# Patient Record
Sex: FEMALE | Race: WHITE | NOT HISPANIC OR LATINO | Employment: FULL TIME | URBAN - METROPOLITAN AREA
[De-identification: names, ages, dates, MRNs, and addresses within clinical notes are randomized per-mention and may not be internally consistent; named-entity substitution may affect disease eponyms.]

---

## 2019-12-28 ENCOUNTER — HOSPITAL ENCOUNTER (EMERGENCY)
Facility: MEDICAL CENTER | Age: 54
End: 2019-12-28
Attending: EMERGENCY MEDICINE
Payer: COMMERCIAL

## 2019-12-28 VITALS
RESPIRATION RATE: 20 BRPM | WEIGHT: 163.14 LBS | SYSTOLIC BLOOD PRESSURE: 157 MMHG | HEART RATE: 51 BPM | DIASTOLIC BLOOD PRESSURE: 59 MMHG | HEIGHT: 68 IN | BODY MASS INDEX: 24.73 KG/M2 | OXYGEN SATURATION: 97 % | TEMPERATURE: 96.9 F

## 2019-12-28 DIAGNOSIS — H53.9 VISUAL CHANGES: ICD-10-CM

## 2019-12-28 PROCEDURE — 99284 EMERGENCY DEPT VISIT MOD MDM: CPT

## 2019-12-28 RX ORDER — PROPARACAINE HYDROCHLORIDE 5 MG/ML
1 SOLUTION/ DROPS OPHTHALMIC ONCE
Status: DISCONTINUED | OUTPATIENT
Start: 2019-12-28 | End: 2019-12-28 | Stop reason: HOSPADM

## 2019-12-28 NOTE — DISCHARGE INSTRUCTIONS
Please call the ophthalmologist office tomorrow morning to make a follow-up appointment.  Return if any of the symptoms change or worsen

## 2019-12-28 NOTE — ED TRIAGE NOTES
"Presents accompanied by family.  She reports a history of right eye retinal detachment with surgical intervention this past August .  Today, she C/O similar symptoms with adjunct \"redness.\"  She is bradycardic consequent to athletic fitness level.    Chief Complaint   Patient presents with   • Eye Pain     /81   Pulse (!) 58   Temp 36.1 °C (96.9 °F)   Resp 20   Ht 1.727 m (5' 8\")   Wt 74 kg (163 lb 2.3 oz)   SpO2 98%   BMI 24.81 kg/m²       "

## 2019-12-28 NOTE — ED PROVIDER NOTES
"ED Provider Note  ED Provider    Means of arrival: Private vehicle  History obtained from: Patient  History limited by: None    CHIEF COMPLAINT  Chief Complaint   Patient presents with   • Eye Injury       HPI  Melva Saucead is a 54 y.o. female who presents with complaints of change in vision of the right eye, the changes isolated as a color change.  She noticed that in the right eye everything is pink in color.  There is no blurred vision no change in clarity of vision, just the color change.  She has no flashes.  She has chronic floaters which has not changed.  She has a history of retinal detachment that I had surgery done in 2017 by the ER repair of the retina and also took out her artificial lens.  She is had no problems since that time.    REVIEW OF SYSTEMS  See HPI for further details.     PAST MEDICAL HISTORY       SOCIAL HISTORY  Social History     Tobacco Use   • Smoking status: Never Smoker   • Smokeless tobacco: Never Used   Substance and Sexual Activity   • Alcohol use: Yes     Comment: Occasionally   • Drug use: Never   • Sexual activity: Not on file       SURGICAL HISTORY  patient denies any surgical history    CURRENT MEDICATIONS  Home Medications     Reviewed by Nerissa Mayer (Pharmacy Tech) on 12/28/19 at 1146  Med List Status: Complete   Medication Last Dose Status        Patient Arash Taking any Medications                       ALLERGIES  No Known Allergies    PHYSICAL EXAM  VITAL SIGNS: /81   Pulse (!) 58   Temp 36.1 °C (96.9 °F)   Resp 20   Ht 1.727 m (5' 8\")   Wt 74 kg (163 lb 2.3 oz)   SpO2 98%   BMI 24.81 kg/m²   Constitutional: Alert in no apparent distress.  HENT: Normocephalic, Atraumatic, Mucous membranes are moist  Eyes:  Conjunctiva normal, non-icteric.  Extraocular movements are intact.  Funduscopic exam shows no signs of bleeding, no signs of retinal detachment.    Slit-lamp exam shows no corneal abrasion, anterior chambers deep and clear.  There is a retained " suture at the upper sclera behind the lid both no signs of infection or swelling  Heart: no peripheral cyanosis  Lungs: respirations are even and unlabored  Skin: Warm, Dry,normal color  Neurologic: Alert, Grossly non-focal.   Psychiatric: Affect normal, Judgment normal, Mood normal, Appears appropriate and not intoxicated.     MEDICAL DECISION MAKING  This is a 54 y.o. female who presents with color change to the right eye.  It is global to the right eye, and not in sections not consistent with a retinal detachment.  She has no flashes and her floaters are unchanged.  I can see no signs of bleeding or detachment.  I spoke with dr Valdez the ophthalmologist on call and she will follow the patient up in the office.  There is no blurred vision, no change in her visual acuity that she has noticed is just an isolated color change    DIAGNOSTIC STUDIES / PROCEDURES    LABS      RADIOLOGY  No orders to display       COURSE  Pertinent Labs & Imaging studies reviewed. (See chart for details)    12:34 PM - Patient seen and examined at bedside. Discussed plan of care,    Procedure: Alcaine drop installation, fluorescein drop instillation slit-lamp examination    FINAL IMPRESSION  1. Visual changes

## 2020-02-13 ENCOUNTER — HOSPITAL ENCOUNTER (OUTPATIENT)
Dept: RADIOLOGY | Facility: MEDICAL CENTER | Age: 55
End: 2020-02-13
Attending: PHYSICIAN ASSISTANT
Payer: COMMERCIAL

## 2020-02-13 DIAGNOSIS — S92.354A CLOSED NONDISPLACED FRACTURE OF FIFTH METATARSAL BONE OF RIGHT FOOT, INITIAL ENCOUNTER: ICD-10-CM

## 2020-02-13 PROCEDURE — 73718 MRI LOWER EXTREMITY W/O DYE: CPT | Mod: RT

## 2020-07-31 ENCOUNTER — TELEPHONE (OUTPATIENT)
Dept: SCHEDULING | Facility: IMAGING CENTER | Age: 55
End: 2020-07-31

## 2020-08-13 ENCOUNTER — OFFICE VISIT (OUTPATIENT)
Dept: MEDICAL GROUP | Facility: MEDICAL CENTER | Age: 55
End: 2020-08-13
Payer: COMMERCIAL

## 2020-08-13 ENCOUNTER — HOSPITAL ENCOUNTER (OUTPATIENT)
Facility: MEDICAL CENTER | Age: 55
End: 2020-08-13
Attending: NURSE PRACTITIONER
Payer: COMMERCIAL

## 2020-08-13 VITALS
DIASTOLIC BLOOD PRESSURE: 70 MMHG | WEIGHT: 163.14 LBS | OXYGEN SATURATION: 97 % | SYSTOLIC BLOOD PRESSURE: 130 MMHG | RESPIRATION RATE: 18 BRPM | TEMPERATURE: 97.6 F | HEART RATE: 60 BPM | BODY MASS INDEX: 24.73 KG/M2 | HEIGHT: 68 IN

## 2020-08-13 DIAGNOSIS — Z98.890 HISTORY OF RIGHT BREAST BIOPSY: ICD-10-CM

## 2020-08-13 DIAGNOSIS — Z80.3 FAMILY HISTORY OF BREAST CANCER IN MOTHER: ICD-10-CM

## 2020-08-13 DIAGNOSIS — Z01.419 ENCOUNTER FOR GYNECOLOGICAL EXAMINATION WITHOUT ABNORMAL FINDING: ICD-10-CM

## 2020-08-13 DIAGNOSIS — Z13.29 THYROID DISORDER SCREEN: ICD-10-CM

## 2020-08-13 DIAGNOSIS — Z13.220 LIPID SCREENING: ICD-10-CM

## 2020-08-13 DIAGNOSIS — Z98.890 HISTORY OF LUMPECTOMY: ICD-10-CM

## 2020-08-13 DIAGNOSIS — N64.59 ABNORMAL BREAST EXAM: ICD-10-CM

## 2020-08-13 DIAGNOSIS — Z13.21 ENCOUNTER FOR VITAMIN DEFICIENCY SCREENING: ICD-10-CM

## 2020-08-13 PROBLEM — Z86.69 HISTORY OF RETINAL DETACHMENT: Status: ACTIVE | Noted: 2020-08-13

## 2020-08-13 PROBLEM — Z98.49 HISTORY OF CATARACT EXTRACTION: Status: ACTIVE | Noted: 2020-08-13

## 2020-08-13 PROCEDURE — 99386 PREV VISIT NEW AGE 40-64: CPT | Performed by: NURSE PRACTITIONER

## 2020-08-13 PROCEDURE — 99213 OFFICE O/P EST LOW 20 MIN: CPT | Mod: 25 | Performed by: NURSE PRACTITIONER

## 2020-08-13 PROCEDURE — 99000 SPECIMEN HANDLING OFFICE-LAB: CPT | Performed by: NURSE PRACTITIONER

## 2020-08-13 PROCEDURE — 88175 CYTOPATH C/V AUTO FLUID REDO: CPT

## 2020-08-13 PROCEDURE — 87624 HPV HI-RISK TYP POOLED RSLT: CPT

## 2020-08-13 SDOH — HEALTH STABILITY: MENTAL HEALTH: HOW OFTEN DO YOU HAVE A DRINK CONTAINING ALCOHOL?: 2-3 TIMES A WEEK

## 2020-08-13 NOTE — ASSESSMENT & PLAN NOTE
She reports remote history of abnormality on mammogram, lumpectomy in the right breast which was benign.  She had markers placed here.  She is also had biopsy in the left breast which was benign.  In the last month or so she believes that the appearance of her breasts are changing, they are no longer symmetrical and she feels that there is some dimpling below the areola in the left breast.  She has not felt a discrete mass.  Her last mammogram was approximately 6 years ago.  Her mother was diagnosed with breast cancer in her early 40s.  She denies swelling or tenderness in the axillary region, nipple discharge, overlying skin changes

## 2020-08-16 LAB
CYTOLOGY REG CYTOL: NORMAL
HPV HR 12 DNA CVX QL NAA+PROBE: NEGATIVE
HPV16 DNA SPEC QL NAA+PROBE: NEGATIVE
HPV18 DNA SPEC QL NAA+PROBE: NEGATIVE
SPECIMEN SOURCE: NORMAL

## 2020-08-20 ENCOUNTER — HOSPITAL ENCOUNTER (OUTPATIENT)
Dept: RADIOLOGY | Facility: MEDICAL CENTER | Age: 55
End: 2020-08-20
Attending: NURSE PRACTITIONER
Payer: COMMERCIAL

## 2020-08-20 DIAGNOSIS — N64.59 ABNORMAL BREAST EXAM: ICD-10-CM

## 2020-08-20 DIAGNOSIS — R87.619 ATYPICAL ENDOMETRIAL CELLS ON PAP SMEAR: ICD-10-CM

## 2020-08-20 PROCEDURE — 76642 ULTRASOUND BREAST LIMITED: CPT | Mod: LT

## 2020-08-20 PROCEDURE — G0279 TOMOSYNTHESIS, MAMMO: HCPCS

## 2021-03-15 DIAGNOSIS — Z23 NEED FOR VACCINATION: ICD-10-CM

## 2021-09-11 ENCOUNTER — APPOINTMENT (OUTPATIENT)
Dept: RADIOLOGY | Facility: MEDICAL CENTER | Age: 56
End: 2021-09-11
Attending: EMERGENCY MEDICINE
Payer: COMMERCIAL

## 2021-09-11 ENCOUNTER — HOSPITAL ENCOUNTER (EMERGENCY)
Facility: MEDICAL CENTER | Age: 56
End: 2021-09-11
Attending: EMERGENCY MEDICINE
Payer: COMMERCIAL

## 2021-09-11 VITALS
DIASTOLIC BLOOD PRESSURE: 62 MMHG | BODY MASS INDEX: 24.06 KG/M2 | HEIGHT: 68 IN | OXYGEN SATURATION: 91 % | TEMPERATURE: 97 F | WEIGHT: 158.73 LBS | RESPIRATION RATE: 16 BRPM | HEART RATE: 52 BPM | SYSTOLIC BLOOD PRESSURE: 138 MMHG

## 2021-09-11 DIAGNOSIS — Z51.89 ENCOUNTER FOR WOUND RE-CHECK: ICD-10-CM

## 2021-09-11 LAB
ALBUMIN SERPL BCP-MCNC: 4.2 G/DL (ref 3.2–4.9)
ALBUMIN/GLOB SERPL: 1.7 G/DL
ALP SERPL-CCNC: 59 U/L (ref 30–99)
ALT SERPL-CCNC: 12 U/L (ref 2–50)
ANION GAP SERPL CALC-SCNC: 13 MMOL/L (ref 7–16)
AST SERPL-CCNC: 18 U/L (ref 12–45)
BASOPHILS # BLD AUTO: 0.4 % (ref 0–1.8)
BASOPHILS # BLD: 0.03 K/UL (ref 0–0.12)
BILIRUB SERPL-MCNC: 0.5 MG/DL (ref 0.1–1.5)
BUN SERPL-MCNC: 19 MG/DL (ref 8–22)
CALCIUM SERPL-MCNC: 9.1 MG/DL (ref 8.4–10.2)
CHLORIDE SERPL-SCNC: 103 MMOL/L (ref 96–112)
CO2 SERPL-SCNC: 24 MMOL/L (ref 20–33)
CREAT SERPL-MCNC: 0.55 MG/DL (ref 0.5–1.4)
CRP SERPL HS-MCNC: <0.3 MG/DL (ref 0–0.75)
EOSINOPHIL # BLD AUTO: 0.03 K/UL (ref 0–0.51)
EOSINOPHIL NFR BLD: 0.4 % (ref 0–6.9)
ERYTHROCYTE [DISTWIDTH] IN BLOOD BY AUTOMATED COUNT: 42 FL (ref 35.9–50)
ERYTHROCYTE [SEDIMENTATION RATE] IN BLOOD BY WESTERGREN METHOD: 10 MM/HOUR (ref 0–25)
GLOBULIN SER CALC-MCNC: 2.5 G/DL (ref 1.9–3.5)
GLUCOSE SERPL-MCNC: 94 MG/DL (ref 65–99)
HCT VFR BLD AUTO: 44.7 % (ref 37–47)
HGB BLD-MCNC: 14.8 G/DL (ref 12–16)
IMM GRANULOCYTES # BLD AUTO: 0.02 K/UL (ref 0–0.11)
IMM GRANULOCYTES NFR BLD AUTO: 0.3 % (ref 0–0.9)
LYMPHOCYTES # BLD AUTO: 1.53 K/UL (ref 1–4.8)
LYMPHOCYTES NFR BLD: 22.6 % (ref 22–41)
MCH RBC QN AUTO: 32 PG (ref 27–33)
MCHC RBC AUTO-ENTMCNC: 33.1 G/DL (ref 33.6–35)
MCV RBC AUTO: 96.8 FL (ref 81.4–97.8)
MONOCYTES # BLD AUTO: 0.64 K/UL (ref 0–0.85)
MONOCYTES NFR BLD AUTO: 9.4 % (ref 0–13.4)
NEUTROPHILS # BLD AUTO: 4.53 K/UL (ref 2–7.15)
NEUTROPHILS NFR BLD: 66.9 % (ref 44–72)
NRBC # BLD AUTO: 0 K/UL
NRBC BLD-RTO: 0 /100 WBC
PLATELET # BLD AUTO: 269 K/UL (ref 164–446)
PMV BLD AUTO: 8.7 FL (ref 9–12.9)
POTASSIUM SERPL-SCNC: 4.3 MMOL/L (ref 3.6–5.5)
PROT SERPL-MCNC: 6.7 G/DL (ref 6–8.2)
RBC # BLD AUTO: 4.62 M/UL (ref 4.2–5.4)
SODIUM SERPL-SCNC: 140 MMOL/L (ref 135–145)
WBC # BLD AUTO: 6.8 K/UL (ref 4.8–10.8)

## 2021-09-11 PROCEDURE — 85025 COMPLETE CBC W/AUTO DIFF WBC: CPT

## 2021-09-11 PROCEDURE — 99283 EMERGENCY DEPT VISIT LOW MDM: CPT

## 2021-09-11 PROCEDURE — 85652 RBC SED RATE AUTOMATED: CPT

## 2021-09-11 PROCEDURE — 73630 X-RAY EXAM OF FOOT: CPT | Mod: RT

## 2021-09-11 PROCEDURE — 86140 C-REACTIVE PROTEIN: CPT

## 2021-09-11 PROCEDURE — 80053 COMPREHEN METABOLIC PANEL: CPT

## 2021-09-11 NOTE — ED TRIAGE NOTES
"Reports a history of right foot surgery approximately 3.5 weeks ago.  She C/O of possible cellulitis with increasing pain and swelling.  She is currently on antibiotic therapy.   Chief Complaint   Patient presents with   • Foot Pain   • Wound Check     /96   Pulse 72   Temp 36.1 °C (97 °F) (Temporal)   Resp 20   Ht 1.727 m (5' 8\")   Wt 72 kg (158 lb 11.7 oz)   SpO2 99%   BMI 24.14 kg/m²    Has this patient been vaccinated for COVID yes        "

## 2021-09-11 NOTE — ED PROVIDER NOTES
ED Provider Note    CHIEF COMPLAINT  Chief Complaint   Patient presents with   • Foot Pain   • Wound Check       HPI  Melva Sauceda is a 56 y.o. female who presents for evaluation of worsening of pain on the dorsal aspect of the right medial foot.  The patient is around 3 weeks postop with Dr. Hooper from a right bunionectomy.  The patient was doing well postoperatively and then developed some subtle redness.  He placed her on some Keflex.  She denies high fevers or chills.  She reports that there is some localized irritation at the surgical site.  No high fevers or chills.  The patient is otherwise healthy with no history of diabetes.  She is an avid distance runner    REVIEW OF SYSTEMS  See HPI for further details.  No high fevers chills night sweats weight loss all other systems are negative.     PAST MEDICAL HISTORY  No past medical history on file.  History of bunion  FAMILY HISTORY  Noncontributory    SOCIAL HISTORY  Social History     Socioeconomic History   • Marital status: Single     Spouse name: Not on file   • Number of children: Not on file   • Years of education: Not on file   • Highest education level: Not on file   Occupational History   • Not on file   Tobacco Use   • Smoking status: Never Smoker   • Smokeless tobacco: Never Used   Substance and Sexual Activity   • Alcohol use: Yes     Comment: Occasionally   • Drug use: Never   • Sexual activity: Not Currently   Other Topics Concern   • Not on file   Social History Narrative   • Not on file     Social Determinants of Health     Financial Resource Strain:    • Difficulty of Paying Living Expenses:    Food Insecurity:    • Worried About Running Out of Food in the Last Year:    • Ran Out of Food in the Last Year:    Transportation Needs:    • Lack of Transportation (Medical):    • Lack of Transportation (Non-Medical):    Physical Activity:    • Days of Exercise per Week:    • Minutes of Exercise per Session:    Stress:    • Feeling of Stress :   "  Social Connections:    • Frequency of Communication with Friends and Family:    • Frequency of Social Gatherings with Friends and Family:    • Attends Alevism Services:    • Active Member of Clubs or Organizations:    • Attends Club or Organization Meetings:    • Marital Status:    Intimate Partner Violence:    • Fear of Current or Ex-Partner:    • Emotionally Abused:    • Physically Abused:    • Sexually Abused:      No IV drugs  SURGICAL HISTORY  No past surgical history on file.  Recent bunion surgery with Dr. Hooper  CURRENT MEDICATIONS  Home Medications    **Home medications have not yet been reviewed for this encounter**       Keflex  ALLERGIES  No Known Allergies    PHYSICAL EXAM  VITAL SIGNS: /96   Pulse 72   Temp 36.1 °C (97 °F) (Temporal)   Resp 20   Ht 1.727 m (5' 8\")   Wt 72 kg (158 lb 11.7 oz)   SpO2 99%   BMI 24.14 kg/m²       Constitutional: Well developed, Well nourished, No acute distress, Non-toxic appearance.   HENT: Normocephalic, Atraumatic, Bilateral external ears normal, Oropharynx moist, No oral exudates, Nose normal.   Eyes: PERRLA, EOMI, Conjunctiva normal, No discharge.   Neck: Normal range of motion, No tenderness, Supple, No stridor.   Cardiovascular: Normal heart rate, Normal rhythm, No murmurs, No rubs, No gallops.   Thorax & Lungs: Normal breath sounds, No respiratory distress, No wheezing, No chest tenderness.   Abdomen: Bowel sounds normal, Soft, No tenderness, No masses, No pulsatile masses.   Skin: Warm, Dry, No erythema, No rash.   Extremities: Right lower extremity exam is notable for longitudinal incision overlying the first metatarsal.  There is some minimal erythema but no dehiscence no pus.  Surgical incision is otherwise clean dry and intact.  Surgical incision on the base of the right fifth toe is clean dry and intact  Neurologic: Alert & oriented x 3, Normal motor function, Normal sensory function, No focal deficits noted.   Psychiatric: Affect normal, " Judgment normal, Mood normal.     DX-FOOT-COMPLETE 3+ RIGHT   Final Result      Fifth metatarsal shaft fracture fixation      First TMT plate and screw fixation      No radiographic evidence of bony destruction or acute fracture      Forefoot soft tissue swelling          Results for orders placed or performed during the hospital encounter of 09/11/21   Sed Rate   Result Value Ref Range    Sed Rate Westergren 10 0 - 25 mm/hour   CRP QUANTITIVE (NON-CARDIAC)   Result Value Ref Range    Stat C-Reactive Protein <0.30 0.00 - 0.75 mg/dL   CBC WITH DIFFERENTIAL   Result Value Ref Range    WBC 6.8 4.8 - 10.8 K/uL    RBC 4.62 4.20 - 5.40 M/uL    Hemoglobin 14.8 12.0 - 16.0 g/dL    Hematocrit 44.7 37.0 - 47.0 %    MCV 96.8 81.4 - 97.8 fL    MCH 32.0 27.0 - 33.0 pg    MCHC 33.1 (L) 33.6 - 35.0 g/dL    RDW 42.0 35.9 - 50.0 fL    Platelet Count 269 164 - 446 K/uL    MPV 8.7 (L) 9.0 - 12.9 fL    Neutrophils-Polys 66.90 44.00 - 72.00 %    Lymphocytes 22.60 22.00 - 41.00 %    Monocytes 9.40 0.00 - 13.40 %    Eosinophils 0.40 0.00 - 6.90 %    Basophils 0.40 0.00 - 1.80 %    Immature Granulocytes 0.30 0.00 - 0.90 %    Nucleated RBC 0.00 /100 WBC    Neutrophils (Absolute) 4.53 2.00 - 7.15 K/uL    Lymphs (Absolute) 1.53 1.00 - 4.80 K/uL    Monos (Absolute) 0.64 0.00 - 0.85 K/uL    Eos (Absolute) 0.03 0.00 - 0.51 K/uL    Baso (Absolute) 0.03 0.00 - 0.12 K/uL    Immature Granulocytes (abs) 0.02 0.00 - 0.11 K/uL    NRBC (Absolute) 0.00 K/uL   Comp Metabolic Panel   Result Value Ref Range    Sodium 140 135 - 145 mmol/L    Potassium 4.3 3.6 - 5.5 mmol/L    Chloride 103 96 - 112 mmol/L    Co2 24 20 - 33 mmol/L    Anion Gap 13.0 7.0 - 16.0    Glucose 94 65 - 99 mg/dL    Bun 19 8 - 22 mg/dL    Creatinine 0.55 0.50 - 1.40 mg/dL    Calcium 9.1 8.4 - 10.2 mg/dL    AST(SGOT) 18 12 - 45 U/L    ALT(SGPT) 12 2 - 50 U/L    Alkaline Phosphatase 59 30 - 99 U/L    Total Bilirubin 0.5 0.1 - 1.5 mg/dL    Albumin 4.2 3.2 - 4.9 g/dL    Total Protein 6.7 6.0  - 8.2 g/dL    Globulin 2.5 1.9 - 3.5 g/dL    A-G Ratio 1.7 g/dL   ESTIMATED GFR   Result Value Ref Range    GFR If African American >60 >60 mL/min/1.73 m 2    GFR If Non African American >60 >60 mL/min/1.73 m 2       COURSE & MEDICAL DECISION MAKING  Pertinent Labs & Imaging studies reviewed. (See chart for details)  I reviewed the patient's visit history.  I was concerned about possible either local wound or deep space infection.  Here she has no fever or leukocytosis.  Specifically there is no bandemia metabolic acidosis.  CRP and sed rate are normal.  Radiograph does not suggest any suggestion of osteomyelitis.  I reviewed the case with Dr. De La Vega.  The patient is only 2 to 3 days into her oral antibiotic regimen.  I see no overt evidence of wound infection or deep space infection.  I recommended that she continue the full course of the Keflex.  I have provided and applied appropriate antibiotic ointment and nonadhesive dressings and provided her with supplies.  I recommended following up with Dr. Hooper for wound check in 2 to 3 days or to return as needed for new or worsening symptoms    FINAL IMPRESSION  1.  Encounter for wound care, postoperative right foot      Electronically signed by: Bam Duke M.D., 9/11/2021 12:40 PM

## 2021-09-11 NOTE — ED NOTES
Assisted w/ discharge.  Reviewed discharge instructions w/ pt, verbalized understanding to information provided including follow up care and return precautions, denied questions/concerns.  Pt placed brace back on RLE.  Pt ambulated from ED.

## 2021-11-16 ENCOUNTER — OFFICE VISIT (OUTPATIENT)
Dept: MEDICAL GROUP | Facility: MEDICAL CENTER | Age: 56
End: 2021-11-16
Payer: COMMERCIAL

## 2021-11-16 VITALS
DIASTOLIC BLOOD PRESSURE: 82 MMHG | WEIGHT: 162.48 LBS | HEIGHT: 68 IN | TEMPERATURE: 97.1 F | OXYGEN SATURATION: 99 % | BODY MASS INDEX: 24.62 KG/M2 | SYSTOLIC BLOOD PRESSURE: 130 MMHG | RESPIRATION RATE: 18 BRPM | HEART RATE: 52 BPM

## 2021-11-16 DIAGNOSIS — Z12.31 ENCOUNTER FOR SCREENING MAMMOGRAM FOR MALIGNANT NEOPLASM OF BREAST: ICD-10-CM

## 2021-11-16 DIAGNOSIS — N63.20 BREAST MASS, LEFT: ICD-10-CM

## 2021-11-16 DIAGNOSIS — Z13.21 ENCOUNTER FOR VITAMIN DEFICIENCY SCREENING: ICD-10-CM

## 2021-11-16 DIAGNOSIS — Z00.00 ANNUAL PHYSICAL EXAM: ICD-10-CM

## 2021-11-16 DIAGNOSIS — Z13.220 LIPID SCREENING: ICD-10-CM

## 2021-11-16 DIAGNOSIS — Z13.29 THYROID DISORDER SCREEN: ICD-10-CM

## 2021-11-16 PROCEDURE — 99396 PREV VISIT EST AGE 40-64: CPT | Performed by: NURSE PRACTITIONER

## 2021-11-16 PROCEDURE — 99213 OFFICE O/P EST LOW 20 MIN: CPT | Mod: 25 | Performed by: NURSE PRACTITIONER

## 2021-11-16 RX ORDER — GABAPENTIN 100 MG/1
CAPSULE ORAL
COMMUNITY
End: 2021-11-16

## 2021-11-16 RX ORDER — CEPHALEXIN 500 MG/1
CAPSULE ORAL
COMMUNITY
Start: 2021-09-09 | End: 2021-11-16

## 2021-11-16 RX ORDER — CEPHALEXIN 500 MG/1
CAPSULE ORAL
COMMUNITY
End: 2021-11-16

## 2021-11-16 RX ORDER — GABAPENTIN 100 MG/1
CAPSULE ORAL
COMMUNITY
Start: 2021-08-20 | End: 2021-11-16

## 2021-11-16 RX ORDER — OXYCODONE HYDROCHLORIDE 5 MG/1
TABLET ORAL
COMMUNITY
End: 2021-11-16

## 2021-11-16 RX ORDER — TRAMADOL HYDROCHLORIDE 50 MG/1
TABLET ORAL
COMMUNITY
End: 2021-11-16

## 2021-11-16 ASSESSMENT — PATIENT HEALTH QUESTIONNAIRE - PHQ9: CLINICAL INTERPRETATION OF PHQ2 SCORE: 0

## 2021-11-16 ASSESSMENT — FIBROSIS 4 INDEX: FIB4 SCORE: 1.1

## 2021-11-16 NOTE — PROGRESS NOTES
"Subjective:     Chief Complaint   Patient presents with   • Breast Mass     (left)     Melva Sauceda is a 56 y.o. female here for annual and with below concern.  Up-to-date on Pap smear as well as colon cancer screening.  Influenza vaccine declined today    She has not been able to do as much cardio exercise as usual due to a recent foot fracture and surgical repair.  She is gradually getting back into her exercise regimen    Blood pressure is elevated in the clinic today with initial reading of 144/86.  Repeat 130/82.  No medications for this currently.  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  She noticed this recently, it has not changed in size during that time.  She does have history of lumpectomy and benign biopsy in the right breast.  Last year she was feeling a small similar area on the left, diagnostic mammogram at that time showed a small simple cyst  She has had an inverted nipple on this left side for quite some time but feels that it is getting worse  Mother diagnosed with breast cancer in her early 40s.  She is unsure of any BRCA testing  No nipple discharge, no associated pain or overlying skin changes       Current medicines (including changes today)  No current outpatient medications on file.     No current facility-administered medications for this visit.     She  has no past medical history on file.    ROS included above     Objective:     /82 (BP Location: Left arm, Patient Position: Sitting, BP Cuff Size: Adult)   Pulse (!) 52   Temp 36.2 °C (97.1 °F) (Temporal)   Resp 18   Ht 1.727 m (5' 8\")   Wt 73.7 kg (162 lb 7.7 oz)   SpO2 99%  Body mass index is 24.7 kg/m².     Physical Exam:  General: Alert, oriented in no acute distress.  Eye contact is good, speech is normal, affect calm  HEENT:  TMs gray with good landmarks " bilaterally. No lymphadenopathy.  Lungs: clear to auscultation bilaterally, normal effort, no wheeze/ rhonchi/ rales.  CV: regular rate and rhythm, S1, S2, no murmur  Breast: Right breast smooth without palpable mass.  No nipple inversion or discharge.  Left breast with approximately 4 cm irregularly shaped mass in the upper outer quadrant at approximately 2-3 o'clock.  Nipple inversion noted.  No discharge.  No overlying erythema or skin changes.  No axillary lymphadenopathy.   Abdomen: soft, nontender  Ext: no edema, color normal, vascularity normal, temperature normal    Assessment and Plan:   The following treatment plan was discussed   1. Annual physical exam Normal physical exam except as discussed below.  Up-to-date on Pap smear. General health and wellness discussion including healthy diet, regular exercise. 2000 iu Vit d3 advised daily. Preventative health screenings -labs as listed. Advised regular dental cleanings, eye exam yearly.  Blood pressure initially elevated in clinic, improved on repeat reading.  Discussed cutoff for hypertension being 135/90.  She will monitor blood pressure at home and may report back to me with readings.  Encouraged increase cardio exercise, low-sodium diet.  Continue avoidance of alcohol and tobacco products.  Comp Metabolic Panel    Lipid Profile    TSH WITH REFLEX TO FT4    VITAMIN D,25 HYDROXY   2. Breast mass, left   approximately 4 cm irregularly shaped palpable mass in the left upper outer quadrant.  Mother diagnosed with breast cancer in her 40s.  She personally has history of benign biopsy and lumpectomy on the right breast.  We will evaluate diagnostic imaging  MA-DIAGNOSTIC MAMMO BILAT W/O CAD    US-BREAST LIMITED-LEFT   3. Encounter for screening mammogram for malignant neoplasm of breast  MA-DIAGNOSTIC MAMMO BILAT W/O CAD   4. Encounter for vitamin deficiency screening  VITAMIN D,25 HYDROXY   5. Thyroid disorder screen  TSH WITH REFLEX TO FT4   6. Lipid screening   Lipid Profile       Followup: Pending labs and imaging         Please note that this dictation was created using voice recognition software. I have worked with consultants from the vendor as well as technical experts from [facility] to optimize the interface. I have made every reasonable attempt to correct obvious errors, but I expect that there are errors of grammar and possibly content that I did not discover before finalizing the note.

## 2021-11-16 NOTE — ASSESSMENT & PLAN NOTE
Patient presents today with a concern of a new mass in the left breast upper outer quadrant.  She noticed this recently, it has not changed in size during that time.  She does have history of lumpectomy and benign biopsy in the right breast.  Last year she was feeling a small similar area on the left, diagnostic mammogram at that time showed a small simple cyst  She has had an inverted nipple on this left side for quite some time but feels that it is getting worse  Mother diagnosed with breast cancer in her early 40s.  She is unsure of any BRCA testing  No nipple discharge, no associated pain or overlying skin changes

## 2021-11-19 ENCOUNTER — HOSPITAL ENCOUNTER (OUTPATIENT)
Dept: LAB | Facility: MEDICAL CENTER | Age: 56
End: 2021-11-19
Attending: NURSE PRACTITIONER
Payer: COMMERCIAL

## 2021-11-19 DIAGNOSIS — Z13.29 THYROID DISORDER SCREEN: ICD-10-CM

## 2021-11-19 DIAGNOSIS — Z13.220 LIPID SCREENING: ICD-10-CM

## 2021-11-19 DIAGNOSIS — Z00.00 ANNUAL PHYSICAL EXAM: ICD-10-CM

## 2021-11-19 DIAGNOSIS — Z13.21 ENCOUNTER FOR VITAMIN DEFICIENCY SCREENING: ICD-10-CM

## 2021-11-19 LAB
ALBUMIN SERPL BCP-MCNC: 4.5 G/DL (ref 3.2–4.9)
ALBUMIN/GLOB SERPL: 1.7 G/DL
ALP SERPL-CCNC: 55 U/L (ref 30–99)
ALT SERPL-CCNC: 12 U/L (ref 2–50)
ANION GAP SERPL CALC-SCNC: 10 MMOL/L (ref 7–16)
AST SERPL-CCNC: 18 U/L (ref 12–45)
BILIRUB SERPL-MCNC: 0.5 MG/DL (ref 0.1–1.5)
BUN SERPL-MCNC: 14 MG/DL (ref 8–22)
CALCIUM SERPL-MCNC: 9.3 MG/DL (ref 8.4–10.2)
CHLORIDE SERPL-SCNC: 108 MMOL/L (ref 96–112)
CHOLEST SERPL-MCNC: 222 MG/DL (ref 100–199)
CO2 SERPL-SCNC: 25 MMOL/L (ref 20–33)
CREAT SERPL-MCNC: 0.57 MG/DL (ref 0.5–1.4)
FASTING STATUS PATIENT QL REPORTED: NORMAL
GLOBULIN SER CALC-MCNC: 2.7 G/DL (ref 1.9–3.5)
GLUCOSE SERPL-MCNC: 94 MG/DL (ref 65–99)
HDLC SERPL-MCNC: 86 MG/DL
LDLC SERPL CALC-MCNC: 123 MG/DL
POTASSIUM SERPL-SCNC: 4.2 MMOL/L (ref 3.6–5.5)
PROT SERPL-MCNC: 7.2 G/DL (ref 6–8.2)
SODIUM SERPL-SCNC: 143 MMOL/L (ref 135–145)
TRIGL SERPL-MCNC: 66 MG/DL (ref 0–149)
TSH SERPL DL<=0.005 MIU/L-ACNC: 1.16 UIU/ML (ref 0.38–5.33)

## 2021-11-19 PROCEDURE — 82306 VITAMIN D 25 HYDROXY: CPT

## 2021-11-19 PROCEDURE — 84443 ASSAY THYROID STIM HORMONE: CPT

## 2021-11-19 PROCEDURE — 80053 COMPREHEN METABOLIC PANEL: CPT

## 2021-11-19 PROCEDURE — 80061 LIPID PANEL: CPT

## 2021-11-19 PROCEDURE — 36415 COLL VENOUS BLD VENIPUNCTURE: CPT

## 2021-11-22 LAB — 25(OH)D3 SERPL-MCNC: 44 NG/ML (ref 30–80)

## 2021-11-30 ENCOUNTER — HOSPITAL ENCOUNTER (OUTPATIENT)
Dept: RADIOLOGY | Facility: MEDICAL CENTER | Age: 56
End: 2021-11-30
Attending: NURSE PRACTITIONER
Payer: COMMERCIAL

## 2021-11-30 DIAGNOSIS — Z12.31 ENCOUNTER FOR SCREENING MAMMOGRAM FOR MALIGNANT NEOPLASM OF BREAST: ICD-10-CM

## 2021-11-30 DIAGNOSIS — N63.20 BREAST MASS, LEFT: ICD-10-CM

## 2021-11-30 PROCEDURE — 76642 ULTRASOUND BREAST LIMITED: CPT | Mod: LT

## 2021-11-30 PROCEDURE — G0279 TOMOSYNTHESIS, MAMMO: HCPCS

## 2021-12-01 ENCOUNTER — PATIENT MESSAGE (OUTPATIENT)
Dept: MEDICAL GROUP | Facility: MEDICAL CENTER | Age: 56
End: 2021-12-01

## 2021-12-01 ENCOUNTER — HOSPITAL ENCOUNTER (OUTPATIENT)
Dept: RADIOLOGY | Facility: MEDICAL CENTER | Age: 56
End: 2021-12-01
Attending: NURSE PRACTITIONER
Payer: COMMERCIAL

## 2021-12-01 DIAGNOSIS — R92.8 ABNORMAL FINDINGS ON DIAGNOSTIC IMAGING OF BREAST: ICD-10-CM

## 2021-12-01 DIAGNOSIS — Z80.3 FAMILY HISTORY OF BREAST CANCER IN MOTHER: ICD-10-CM

## 2021-12-01 DIAGNOSIS — N63.20 BREAST MASS, LEFT: ICD-10-CM

## 2021-12-01 LAB — PATHOLOGY CONSULT NOTE: NORMAL

## 2021-12-01 PROCEDURE — 88305 TISSUE EXAM BY PATHOLOGIST: CPT | Mod: 59

## 2021-12-01 PROCEDURE — 76942 ECHO GUIDE FOR BIOPSY: CPT

## 2021-12-01 PROCEDURE — 19084 BX BREAST ADD LESION US IMAG: CPT | Mod: LT

## 2021-12-01 PROCEDURE — 19083 BX BREAST 1ST LESION US IMAG: CPT | Mod: LT

## 2021-12-01 PROCEDURE — 19286 PERQ DEV BREAST ADD US IMAG: CPT

## 2021-12-01 PROCEDURE — 19285 PERQ DEV BREAST 1ST US IMAG: CPT | Mod: LT

## 2021-12-01 PROCEDURE — 88360 TUMOR IMMUNOHISTOCHEM/MANUAL: CPT

## 2021-12-02 ENCOUNTER — TELEPHONE (OUTPATIENT)
Dept: RADIOLOGY | Facility: MEDICAL CENTER | Age: 56
End: 2021-12-02

## 2021-12-02 DIAGNOSIS — C50.912 INFILTRATING DUCTAL CARCINOMA OF LEFT FEMALE BREAST (HCC): ICD-10-CM

## 2021-12-02 NOTE — TELEPHONE ENCOUNTER
Left a msg for LISANDRO Forde's MA: make sure office received breast bx results & make sure provider places an urgent referral to a breast surgeon.

## 2021-12-03 ENCOUNTER — PATIENT MESSAGE (OUTPATIENT)
Dept: MEDICAL GROUP | Facility: MEDICAL CENTER | Age: 56
End: 2021-12-03

## 2021-12-06 ENCOUNTER — PATIENT MESSAGE (OUTPATIENT)
Dept: MEDICAL GROUP | Facility: MEDICAL CENTER | Age: 56
End: 2021-12-06

## 2021-12-17 ENCOUNTER — PHARMACY VISIT (OUTPATIENT)
Dept: PHARMACY | Facility: MEDICAL CENTER | Age: 56
End: 2021-12-17
Payer: COMMERCIAL

## 2021-12-17 ENCOUNTER — PATIENT MESSAGE (OUTPATIENT)
Dept: HEALTH INFORMATION MANAGEMENT | Facility: OTHER | Age: 56
End: 2021-12-17
Payer: COMMERCIAL

## 2021-12-17 ENCOUNTER — PATIENT OUTREACH (OUTPATIENT)
Dept: OTHER | Facility: MEDICAL CENTER | Age: 56
End: 2021-12-17

## 2021-12-17 ENCOUNTER — HOSPITAL ENCOUNTER (OUTPATIENT)
Dept: LAB | Facility: MEDICAL CENTER | Age: 56
End: 2021-12-17
Attending: INTERNAL MEDICINE
Payer: COMMERCIAL

## 2021-12-17 LAB
ALBUMIN SERPL BCP-MCNC: 4.5 G/DL (ref 3.2–4.9)
ALBUMIN/GLOB SERPL: 1.9 G/DL
ALP SERPL-CCNC: 53 U/L (ref 30–99)
ALT SERPL-CCNC: 18 U/L (ref 2–50)
ANION GAP SERPL CALC-SCNC: 9 MMOL/L (ref 7–16)
APPEARANCE UR: CLEAR
AST SERPL-CCNC: 25 U/L (ref 12–45)
BASOPHILS # BLD AUTO: 0.7 % (ref 0–1.8)
BASOPHILS # BLD: 0.04 K/UL (ref 0–0.12)
BILIRUB SERPL-MCNC: 0.5 MG/DL (ref 0.1–1.5)
BILIRUB UR QL STRIP.AUTO: NEGATIVE
BUN SERPL-MCNC: 16 MG/DL (ref 8–22)
CALCIUM SERPL-MCNC: 9.2 MG/DL (ref 8.4–10.2)
CHLORIDE SERPL-SCNC: 105 MMOL/L (ref 96–112)
CO2 SERPL-SCNC: 28 MMOL/L (ref 20–33)
COLOR UR: YELLOW
CREAT SERPL-MCNC: 0.55 MG/DL (ref 0.5–1.4)
EOSINOPHIL # BLD AUTO: 0.05 K/UL (ref 0–0.51)
EOSINOPHIL NFR BLD: 0.8 % (ref 0–6.9)
ERYTHROCYTE [DISTWIDTH] IN BLOOD BY AUTOMATED COUNT: 41.6 FL (ref 35.9–50)
FASTING STATUS PATIENT QL REPORTED: NORMAL
GLOBULIN SER CALC-MCNC: 2.4 G/DL (ref 1.9–3.5)
GLUCOSE SERPL-MCNC: 89 MG/DL (ref 65–99)
GLUCOSE UR STRIP.AUTO-MCNC: NEGATIVE MG/DL
HCT VFR BLD AUTO: 44.2 % (ref 37–47)
HGB BLD-MCNC: 14.5 G/DL (ref 12–16)
IMM GRANULOCYTES # BLD AUTO: 0.02 K/UL (ref 0–0.11)
IMM GRANULOCYTES NFR BLD AUTO: 0.3 % (ref 0–0.9)
KETONES UR STRIP.AUTO-MCNC: NEGATIVE MG/DL
LEUKOCYTE ESTERASE UR QL STRIP.AUTO: NEGATIVE
LYMPHOCYTES # BLD AUTO: 2 K/UL (ref 1–4.8)
LYMPHOCYTES NFR BLD: 33.3 % (ref 22–41)
MCH RBC QN AUTO: 31.3 PG (ref 27–33)
MCHC RBC AUTO-ENTMCNC: 32.8 G/DL (ref 33.6–35)
MCV RBC AUTO: 95.5 FL (ref 81.4–97.8)
MICRO URNS: NORMAL
MONOCYTES # BLD AUTO: 0.57 K/UL (ref 0–0.85)
MONOCYTES NFR BLD AUTO: 9.5 % (ref 0–13.4)
NEUTROPHILS # BLD AUTO: 3.32 K/UL (ref 2–7.15)
NEUTROPHILS NFR BLD: 55.4 % (ref 44–72)
NITRITE UR QL STRIP.AUTO: NEGATIVE
NRBC # BLD AUTO: 0 K/UL
NRBC BLD-RTO: 0 /100 WBC
PH UR STRIP.AUTO: 6.5 [PH] (ref 5–8)
PLATELET # BLD AUTO: 261 K/UL (ref 164–446)
PMV BLD AUTO: 9.3 FL (ref 9–12.9)
POTASSIUM SERPL-SCNC: 4.2 MMOL/L (ref 3.6–5.5)
PROT SERPL-MCNC: 6.9 G/DL (ref 6–8.2)
PROT UR QL STRIP: NEGATIVE MG/DL
RBC # BLD AUTO: 4.63 M/UL (ref 4.2–5.4)
RBC UR QL AUTO: NEGATIVE
SODIUM SERPL-SCNC: 142 MMOL/L (ref 135–145)
SP GR UR STRIP.AUTO: 1.01
WBC # BLD AUTO: 6 K/UL (ref 4.8–10.8)

## 2021-12-17 PROCEDURE — 80053 COMPREHEN METABOLIC PANEL: CPT

## 2021-12-17 PROCEDURE — 36415 COLL VENOUS BLD VENIPUNCTURE: CPT

## 2021-12-17 PROCEDURE — 81003 URINALYSIS AUTO W/O SCOPE: CPT

## 2021-12-17 PROCEDURE — 85025 COMPLETE CBC W/AUTO DIFF WBC: CPT

## 2021-12-17 PROCEDURE — RXMED WILLOW AMBULATORY MEDICATION CHARGE: Performed by: INTERNAL MEDICINE

## 2021-12-17 RX ORDER — INFLUENZA A VIRUS A/BRISBANE/02/2018 IVR-190 (H1N1) ANTIGEN (FORMALDEHYDE INACTIVATED), INFLUENZA A VIRUS A/KANSAS/14/2017 X-327 (H3N2) ANTIGEN (FORMALDEHYDE INACTIVATED), INFLUENZA B VIRUS B/PHUKET/3073/2013 ANTIGEN (FORMALDEHYDE INACTIVATED), AND INFLUENZA B VIRUS B/MARYLAND/15/2016 BX-69A ANTIGEN (FORMALDEHYDE INACTIVATED) 15; 15; 15; 15 UG/.5ML; UG/.5ML; UG/.5ML; UG/.5ML
INJECTION, SUSPENSION INTRAMUSCULAR
Qty: 0.5 ML | Refills: 0 | Status: ON HOLD | OUTPATIENT
Start: 2021-12-17 | End: 2021-12-31

## 2021-12-17 RX ORDER — CX-024414 0.2 MG/ML
INJECTION, SUSPENSION INTRAMUSCULAR
Qty: 0.25 ML | Refills: 0 | Status: ON HOLD | OUTPATIENT
Start: 2021-12-17 | End: 2021-12-31

## 2021-12-17 NOTE — PROGRESS NOTES
"Oncology Nurse Navigation Assessment  Phoned pt for follow up.  Pt had a port placed on 12/9/21.  She is established with Dr. Loza and will start chemotherapy the second week of January.         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 She states that this is a mostly administrative position.    FINANCIAL: pt just started a new job and is taking some unpaid time off.  She states if she feels she needs support down the road she will contact navigator.    INSURANCE:  COBRA & HSA.  Pt states her COBRA is affordable.  SUPPORT SYSTEM:  Brother & sister in law (in tx for cancer).  PSYCHOLOGICAL:   States it has been a \"roller coaster\".  She states she is doing well currently.  She states her father was an oncologist and her mother was an oncology nurse.    COMMUNICATION:  No barriers noted   FAMILY CARE: denies barriers  SELF CARE: denies barriers         INTERVENTION:  Intro letter sent via [employer].  "

## 2021-12-19 ENCOUNTER — HOSPITAL ENCOUNTER (OUTPATIENT)
Dept: RADIOLOGY | Facility: MEDICAL CENTER | Age: 56
End: 2021-12-19
Attending: INTERNAL MEDICINE
Payer: COMMERCIAL

## 2021-12-19 DIAGNOSIS — C50.512 MALIGNANT NEOPLASM OF LOWER-OUTER QUADRANT OF LEFT FEMALE BREAST, UNSPECIFIED ESTROGEN RECEPTOR STATUS (HCC): ICD-10-CM

## 2021-12-19 PROCEDURE — A9576 INJ PROHANCE MULTIPACK: HCPCS | Performed by: INTERNAL MEDICINE

## 2021-12-19 PROCEDURE — C8908 MRI W/O FOL W/CONT, BREAST,: HCPCS

## 2021-12-19 PROCEDURE — 700117 HCHG RX CONTRAST REV CODE 255: Performed by: INTERNAL MEDICINE

## 2021-12-19 RX ADMIN — GADOTERIDOL 15 ML: 279.3 INJECTION, SOLUTION INTRAVENOUS at 16:20

## 2021-12-22 ENCOUNTER — PRE-ADMISSION TESTING (OUTPATIENT)
Dept: ADMISSIONS | Facility: MEDICAL CENTER | Age: 56
End: 2021-12-22
Attending: SURGERY
Payer: COMMERCIAL

## 2021-12-22 ASSESSMENT — FIBROSIS 4 INDEX: FIB4 SCORE: 1.29

## 2021-12-23 ENCOUNTER — HOSPITAL ENCOUNTER (OUTPATIENT)
Dept: CARDIOLOGY | Facility: MEDICAL CENTER | Age: 56
End: 2021-12-23
Attending: INTERNAL MEDICINE
Payer: COMMERCIAL

## 2021-12-23 DIAGNOSIS — C50.512 MALIGNANT NEOPLASM OF LOWER-OUTER QUADRANT OF LEFT FEMALE BREAST, UNSPECIFIED ESTROGEN RECEPTOR STATUS (HCC): ICD-10-CM

## 2021-12-23 LAB
LV EJECT FRACT  99904: 70
LV EJECT FRACT MOD 2C 99903: 83.99
LV EJECT FRACT MOD 4C 99902: 76.03
LV EJECT FRACT MOD BP 99901: 80.33

## 2021-12-23 PROCEDURE — 93306 TTE W/DOPPLER COMPLETE: CPT

## 2021-12-23 PROCEDURE — 93306 TTE W/DOPPLER COMPLETE: CPT | Mod: 26 | Performed by: INTERNAL MEDICINE

## 2021-12-27 ENCOUNTER — APPOINTMENT (OUTPATIENT)
Dept: RADIOLOGY | Facility: MEDICAL CENTER | Age: 56
End: 2021-12-27
Attending: INTERNAL MEDICINE
Payer: COMMERCIAL

## 2021-12-28 ENCOUNTER — APPOINTMENT (OUTPATIENT)
Dept: RADIOLOGY | Facility: MEDICAL CENTER | Age: 56
End: 2021-12-28
Attending: INTERNAL MEDICINE
Payer: COMMERCIAL

## 2021-12-29 ENCOUNTER — HOSPITAL ENCOUNTER (OUTPATIENT)
Dept: RADIOLOGY | Facility: MEDICAL CENTER | Age: 56
End: 2021-12-29
Attending: INTERNAL MEDICINE
Payer: COMMERCIAL

## 2021-12-29 DIAGNOSIS — C50.512 MALIGNANT NEOPLASM OF LOWER-OUTER QUADRANT OF LEFT FEMALE BREAST, UNSPECIFIED ESTROGEN RECEPTOR STATUS (HCC): ICD-10-CM

## 2021-12-30 ENCOUNTER — HOSPITAL ENCOUNTER (OUTPATIENT)
Dept: RADIOLOGY | Facility: MEDICAL CENTER | Age: 56
End: 2021-12-30
Attending: INTERNAL MEDICINE
Payer: COMMERCIAL

## 2021-12-30 PROCEDURE — A9552 F18 FDG: HCPCS

## 2021-12-31 ENCOUNTER — APPOINTMENT (OUTPATIENT)
Dept: RADIOLOGY | Facility: MEDICAL CENTER | Age: 56
End: 2021-12-31
Attending: SURGERY
Payer: COMMERCIAL

## 2021-12-31 ENCOUNTER — ANESTHESIA (OUTPATIENT)
Dept: SURGERY | Facility: MEDICAL CENTER | Age: 56
End: 2021-12-31
Payer: COMMERCIAL

## 2021-12-31 ENCOUNTER — HOSPITAL ENCOUNTER (OUTPATIENT)
Facility: MEDICAL CENTER | Age: 56
End: 2021-12-31
Attending: SURGERY | Admitting: SURGERY
Payer: COMMERCIAL

## 2021-12-31 ENCOUNTER — ANESTHESIA EVENT (OUTPATIENT)
Dept: SURGERY | Facility: MEDICAL CENTER | Age: 56
End: 2021-12-31
Payer: COMMERCIAL

## 2021-12-31 VITALS
DIASTOLIC BLOOD PRESSURE: 67 MMHG | OXYGEN SATURATION: 94 % | HEIGHT: 68 IN | WEIGHT: 164.9 LBS | SYSTOLIC BLOOD PRESSURE: 122 MMHG | HEART RATE: 57 BPM | TEMPERATURE: 97.4 F | BODY MASS INDEX: 24.99 KG/M2 | RESPIRATION RATE: 18 BRPM

## 2021-12-31 LAB
EXTERNAL QUALITY CONTROL: NORMAL
SARS-COV+SARS-COV-2 AG RESP QL IA.RAPID: NEGATIVE

## 2021-12-31 PROCEDURE — 160025 RECOVERY II MINUTES (STATS): Performed by: SURGERY

## 2021-12-31 PROCEDURE — 160002 HCHG RECOVERY MINUTES (STAT): Performed by: SURGERY

## 2021-12-31 PROCEDURE — 700105 HCHG RX REV CODE 258: Performed by: SURGERY

## 2021-12-31 PROCEDURE — 700101 HCHG RX REV CODE 250: Performed by: ANESTHESIOLOGY

## 2021-12-31 PROCEDURE — 160048 HCHG OR STATISTICAL LEVEL 1-5: Performed by: SURGERY

## 2021-12-31 PROCEDURE — 501838 HCHG SUTURE GENERAL: Performed by: SURGERY

## 2021-12-31 PROCEDURE — C1788 PORT, INDWELLING, IMP: HCPCS | Performed by: SURGERY

## 2021-12-31 PROCEDURE — 700101 HCHG RX REV CODE 250: Performed by: SURGERY

## 2021-12-31 PROCEDURE — 160009 HCHG ANES TIME/MIN: Performed by: SURGERY

## 2021-12-31 PROCEDURE — 160046 HCHG PACU - 1ST 60 MINS PHASE II: Performed by: SURGERY

## 2021-12-31 PROCEDURE — 160035 HCHG PACU - 1ST 60 MINS PHASE I: Performed by: SURGERY

## 2021-12-31 PROCEDURE — 160041 HCHG SURGERY MINUTES - EA ADDL 1 MIN LEVEL 4: Performed by: SURGERY

## 2021-12-31 PROCEDURE — 160029 HCHG SURGERY MINUTES - 1ST 30 MINS LEVEL 4: Performed by: SURGERY

## 2021-12-31 PROCEDURE — 700111 HCHG RX REV CODE 636 W/ 250 OVERRIDE (IP): Performed by: ANESTHESIOLOGY

## 2021-12-31 PROCEDURE — 87426 SARSCOV CORONAVIRUS AG IA: CPT | Performed by: SURGERY

## 2021-12-31 PROCEDURE — 700111 HCHG RX REV CODE 636 W/ 250 OVERRIDE (IP): Performed by: SURGERY

## 2021-12-31 DEVICE — POWER PORTMRI COMPATABLE: Type: IMPLANTABLE DEVICE | Status: FUNCTIONAL

## 2021-12-31 RX ORDER — HALOPERIDOL 5 MG/ML
1 INJECTION INTRAMUSCULAR
Status: DISCONTINUED | OUTPATIENT
Start: 2021-12-31 | End: 2021-12-31 | Stop reason: HOSPADM

## 2021-12-31 RX ORDER — BUPIVACAINE HYDROCHLORIDE AND EPINEPHRINE 5; 5 MG/ML; UG/ML
INJECTION, SOLUTION EPIDURAL; INTRACAUDAL; PERINEURAL
Status: DISCONTINUED | OUTPATIENT
Start: 2021-12-31 | End: 2021-12-31 | Stop reason: HOSPADM

## 2021-12-31 RX ORDER — KETOROLAC TROMETHAMINE 30 MG/ML
INJECTION, SOLUTION INTRAMUSCULAR; INTRAVENOUS PRN
Status: DISCONTINUED | OUTPATIENT
Start: 2021-12-31 | End: 2021-12-31 | Stop reason: SURG

## 2021-12-31 RX ORDER — MEPERIDINE HYDROCHLORIDE 25 MG/ML
12.5 INJECTION INTRAMUSCULAR; INTRAVENOUS; SUBCUTANEOUS
Status: DISCONTINUED | OUTPATIENT
Start: 2021-12-31 | End: 2021-12-31 | Stop reason: HOSPADM

## 2021-12-31 RX ORDER — LIDOCAINE HYDROCHLORIDE 20 MG/ML
INJECTION, SOLUTION EPIDURAL; INFILTRATION; INTRACAUDAL; PERINEURAL PRN
Status: DISCONTINUED | OUTPATIENT
Start: 2021-12-31 | End: 2021-12-31 | Stop reason: SURG

## 2021-12-31 RX ORDER — OXYCODONE HCL 5 MG/5 ML
10 SOLUTION, ORAL ORAL
Status: DISCONTINUED | OUTPATIENT
Start: 2021-12-31 | End: 2021-12-31 | Stop reason: HOSPADM

## 2021-12-31 RX ORDER — ONDANSETRON 2 MG/ML
INJECTION INTRAMUSCULAR; INTRAVENOUS PRN
Status: DISCONTINUED | OUTPATIENT
Start: 2021-12-31 | End: 2021-12-31 | Stop reason: SURG

## 2021-12-31 RX ORDER — SODIUM CHLORIDE, SODIUM LACTATE, POTASSIUM CHLORIDE, CALCIUM CHLORIDE 600; 310; 30; 20 MG/100ML; MG/100ML; MG/100ML; MG/100ML
INJECTION, SOLUTION INTRAVENOUS CONTINUOUS
Status: DISCONTINUED | OUTPATIENT
Start: 2021-12-31 | End: 2021-12-31 | Stop reason: HOSPADM

## 2021-12-31 RX ORDER — ONDANSETRON 2 MG/ML
4 INJECTION INTRAMUSCULAR; INTRAVENOUS
Status: DISCONTINUED | OUTPATIENT
Start: 2021-12-31 | End: 2021-12-31 | Stop reason: HOSPADM

## 2021-12-31 RX ORDER — SENNOSIDES 8.6 MG
650 CAPSULE ORAL EVERY 6 HOURS PRN
COMMUNITY
End: 2022-05-02

## 2021-12-31 RX ORDER — OXYCODONE HCL 5 MG/5 ML
5 SOLUTION, ORAL ORAL
Status: DISCONTINUED | OUTPATIENT
Start: 2021-12-31 | End: 2021-12-31 | Stop reason: HOSPADM

## 2021-12-31 RX ORDER — CEFAZOLIN SODIUM 1 G/3ML
INJECTION, POWDER, FOR SOLUTION INTRAMUSCULAR; INTRAVENOUS PRN
Status: DISCONTINUED | OUTPATIENT
Start: 2021-12-31 | End: 2021-12-31 | Stop reason: SURG

## 2021-12-31 RX ORDER — DIPHENHYDRAMINE HYDROCHLORIDE 50 MG/ML
12.5 INJECTION INTRAMUSCULAR; INTRAVENOUS
Status: DISCONTINUED | OUTPATIENT
Start: 2021-12-31 | End: 2021-12-31 | Stop reason: HOSPADM

## 2021-12-31 RX ORDER — DEXAMETHASONE SODIUM PHOSPHATE 4 MG/ML
INJECTION, SOLUTION INTRA-ARTICULAR; INTRALESIONAL; INTRAMUSCULAR; INTRAVENOUS; SOFT TISSUE PRN
Status: DISCONTINUED | OUTPATIENT
Start: 2021-12-31 | End: 2021-12-31 | Stop reason: SURG

## 2021-12-31 RX ORDER — HEPARIN SODIUM,PORCINE 1000/ML
VIAL (ML) INJECTION
Status: DISCONTINUED | OUTPATIENT
Start: 2021-12-31 | End: 2021-12-31 | Stop reason: HOSPADM

## 2021-12-31 RX ORDER — MIDAZOLAM HYDROCHLORIDE 1 MG/ML
1 INJECTION INTRAMUSCULAR; INTRAVENOUS
Status: DISCONTINUED | OUTPATIENT
Start: 2021-12-31 | End: 2021-12-31 | Stop reason: HOSPADM

## 2021-12-31 RX ADMIN — DEXAMETHASONE SODIUM PHOSPHATE 4 MG: 4 INJECTION, SOLUTION INTRA-ARTICULAR; INTRALESIONAL; INTRAMUSCULAR; INTRAVENOUS; SOFT TISSUE at 07:56

## 2021-12-31 RX ADMIN — PROPOFOL 170 MG: 10 INJECTION, EMULSION INTRAVENOUS at 07:45

## 2021-12-31 RX ADMIN — PROPOFOL 100 MCG/KG/MIN: 10 INJECTION, EMULSION INTRAVENOUS at 07:50

## 2021-12-31 RX ADMIN — LIDOCAINE HYDROCHLORIDE 0.5 ML: 10 INJECTION, SOLUTION EPIDURAL; INFILTRATION; INTRACAUDAL; PERINEURAL at 06:45

## 2021-12-31 RX ADMIN — LIDOCAINE HYDROCHLORIDE 50 MG: 20 INJECTION, SOLUTION EPIDURAL; INFILTRATION; INTRACAUDAL at 07:45

## 2021-12-31 RX ADMIN — CEFAZOLIN 2 G: 330 INJECTION, POWDER, FOR SOLUTION INTRAMUSCULAR; INTRAVENOUS at 07:52

## 2021-12-31 RX ADMIN — ONDANSETRON 4 MG: 2 INJECTION INTRAMUSCULAR; INTRAVENOUS at 08:18

## 2021-12-31 RX ADMIN — KETOROLAC TROMETHAMINE 30 MG: 30 INJECTION, SOLUTION INTRAMUSCULAR at 08:22

## 2021-12-31 RX ADMIN — FENTANYL CITRATE 100 MCG: 50 INJECTION, SOLUTION INTRAMUSCULAR; INTRAVENOUS at 07:43

## 2021-12-31 RX ADMIN — SODIUM CHLORIDE, POTASSIUM CHLORIDE, SODIUM LACTATE AND CALCIUM CHLORIDE: 600; 310; 30; 20 INJECTION, SOLUTION INTRAVENOUS at 06:45

## 2021-12-31 ASSESSMENT — PAIN DESCRIPTION - PAIN TYPE
TYPE: SURGICAL PAIN

## 2021-12-31 ASSESSMENT — PAIN SCALES - GENERAL: PAIN_LEVEL: 2

## 2021-12-31 ASSESSMENT — FIBROSIS 4 INDEX: FIB4 SCORE: 1.29

## 2021-12-31 NOTE — ANESTHESIA PROCEDURE NOTES
Airway    Date/Time: 12/31/2021 7:46 AM  Performed by: Vladimir Salinas M.D.  Authorized by: Vladimir Salinas M.D.     Location:  OR  Urgency:  Elective  Difficult Airway: No    Indications for Airway Management:  Anesthesia      Spontaneous Ventilation: absent    Sedation Level:  Deep  Preoxygenated: Yes    Final Airway Type:  Supraglottic airway  Final Supraglottic Airway:  Standard LMA    SGA Size:  3  Number of Attempts at Approach:  1  Number of Other Approaches Attempted:  0

## 2021-12-31 NOTE — OP REPORT
Preoperative diagnosis; poor peripheral venous access with anticipated systemic chemotherapy  Postoperative diagnosis; same  Operation; implantation subcutaneous venous access catheter tunneled type with reservoir using ultrasound-guided vascular access and fluoroscopic positioning Surgeon; Dr. MISTI FUENTES  Anesthesia; General; Dr. Salinas  Wound classification; clean  Specimens; none  Estimated blood loss; 30 cc  Complications; none  Operative note;  This 56-year-old woman appears to have stage IV breast cancer.  She is HER-2/yolande positive.  She was felt to be a candidate for neoadjuvant chemotherapy.  She has poor peripheral venous access and will require prolonged therapy over the next year.  She was felt to be a candidate for port placement.  The risks possible complications of operation were discussed with the patient in detail.  She accepted these risks and wished to proceed.  She is left-handed and a right subclavian approach was preferred and was successful.  She received detailed postoperative care instructions she has an adequate supply of narcotic analgesics from recent foot surgery should she need them.  She received prophylactic antibiotics had sequential stockings applied as an time as an prophylaxis she had a satisfactory preoperative evaluation.  She signed consents for surgery and anesthesia was taken to the operating room and placed under anesthesia.  Once anesthetized her chest and neck were prepped with ChloraPrep solution and sterile drapes were applied a timeout was affected.  A solution of half percent Marcaine with epinephrine was liberally infiltrated in the right infraclavicular location.  Patient was positioned in Trendelenburg.  Ultrasound was utilized to locate  the right subclavian vein.  This was cannulated on direct vision using a 14-gauge needle.  A guidewire was passed through the needle without resistance.  This was confirmed to be in the superior vena cava by fluoroscopy.  An incision  was made from the wire medially in the infraclavicular space down through the skin subcutaneous tissues.  Hemostasis was controlled electrocautery the subcutaneous pocket was fashioned to hold the reservoir using electrocautery.  The patient then had the venous dilator and sheath advanced over the wire into the central position using fluoroscopic guidance.  The dilator was removed and the sheath was then cannulated with the Cook type catheter which was advanced over the wire into central position.  The sheath was removed.  The wire was removed and the catheter was positioned with its tip at the cavoatrial junction.  The catheter was cut to length fitted with a lock placed onto the reservoir locked in place then placed in the pocket.  This was sutured in place using 2-0 nylon sutures to prevent rotation of the device against the pectoral fascia.  Subcutaneous tissues were closed using buried 3-0 Vicryl sutures the skin with running 4-0 Monocryl subcuticular the wound was sealed with Dermabond.  The reservoir was accessed with a Mckeon needle had good blood return and was flushed with heparinized saline the procedure was completed with awakening the patient taking her to the recovery room in stable satisfactory condition with an estimated blood loss 30 cc the procedure was uncomplicated some dissipated the patient will be treated with an ambulatory status with follow-up as needed and with medical oncology next week patient should call if problems arise in the interim from discharge to follow-up or if problems arise with the catheter or wound healing .

## 2021-12-31 NOTE — OR NURSING
0915 - rec pt from PACU, A&O, denies pain, RUE CMS intact, site CD&I with dermabond.  Minimal assist OOB to mirna.  Oriented to room.  Taking po well.  Call light within reach.  0930 - pt getting dressed.

## 2021-12-31 NOTE — ANESTHESIA PREPROCEDURE EVALUATION
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          Relevant Problems   NEURO   (positive) History of retinal detachment       Physical Exam    Airway   Mallampati: II  TM distance: >3 FB  Neck ROM: full       Cardiovascular - normal exam  Rhythm: regular  Rate: normal  (-) murmur     Dental - normal exam           Pulmonary - normal exam  Breath sounds clear to auscultation     Abdominal    Neurological - normal exam                 Anesthesia Plan    ASA 2       Plan - general       Airway plan will be LMA          Induction: intravenous    Postoperative Plan: Postoperative administration of opioids is intended.    Pertinent diagnostic labs and testing reviewed    Informed Consent:    Anesthetic plan and risks discussed with patient.    Use of blood products discussed with: patient whom consented to blood products.

## 2021-12-31 NOTE — PROGRESS NOTES
Pharmacy Medication Reconciliation      ~Medication reconciliation updated and complete per patient at bedside  ~Allergies have been verified   ~No oral ABX within the last 30 days  ~Patient home pharmacy:[facility]

## 2021-12-31 NOTE — ANESTHESIA POSTPROCEDURE EVALUATION
Patient: Melva Sauceda    Procedure Summary     Date: 12/31/21 Room / Location: [place] [address] / SURGERY [facility]    Anesthesia Start: 0741 Anesthesia Stop: 0834    Procedures:       INSERTION, CATHETER - PORT A (Right Chest)      ULTRASOUND GUIDANCE (Right Chest) Diagnosis: (PRIMARY MALIGNANT NEOPLASM OF UPPER OUTER QUADRANT OF FEMALE BREAST)    Surgeons: Shane Jorge M.D. Responsible Provider: Vladimir Salinas M.D.    Anesthesia Type: general ASA Status: 2          Final Anesthesia Type: general  Last vitals  BP   Blood Pressure: 122/58    Temp   36.7 °C (98 °F)    Pulse   (!) 56   Resp   (!) 37    SpO2   93 %      Anesthesia Post Evaluation    Patient location during evaluation: PACU  Patient participation: complete - patient participated  Level of consciousness: awake and alert  Pain score: 2    Airway patency: patent  Anesthetic complications: no  Cardiovascular status: hemodynamically stable  Respiratory status: acceptable  Hydration status: euvolemic    PONV: none          No complications documented.     Nurse Pain Score: 0 (NPRS)

## 2021-12-31 NOTE — DISCHARGE INSTRUCTIONS
ACTIVITY: Rest and take it easy for the first 24 hours.  A responsible adult is recommended to remain with you during that time.  It is normal to feel sleepy.  We encourage you to not do anything that requires balance, judgment or coordination.    MILD FLU-LIKE SYMPTOMS ARE NORMAL. YOU MAY EXPERIENCE GENERALIZED MUSCLE ACHES, THROAT IRRITATION, HEADACHE AND/OR SOME NAUSEA.    FOR 24 HOURS DO NOT:  Drive, operate machinery or run household appliances.  Drink beer or alcoholic beverages.   Make important decisions or sign legal documents.    SPECIAL INSTRUCTIONS: **  Implanted Port Home Guide  An implanted port is a device that is placed under the skin. It is usually placed in the chest. The device can be used to give IV medicine, to take blood, or for dialysis. You may have an implanted port if:  · You need IV medicine that would be irritating to the small veins in your hands or arms.  · You need IV medicines, such as antibiotics, for a long period of time.  · You need IV nutrition for a long period of time.  · You need dialysis.  Having a port means that your health care provider will not need to use the veins in your arms for these procedures. You may have fewer limitations when using a port than you would if you used other types of long-term IVs, and you will likely be able to return to normal activities after your incision heals.  An implanted port has two main parts:  · [place]. The reservoir is the part where a needle is inserted to give medicines or draw blood. The reservoir is round. After it is placed, it appears as a small, raised area under your skin.  · Catheter. The catheter is a thin, flexible tube that connects the reservoir to a vein. Medicine that is inserted into the reservoir goes into the catheter and then into the vein.  How is my port accessed?  To access your port:  · A numbing cream may be placed on the skin over the port site.  · Your health care provider will put on a mask and sterile  gloves.  · The skin over your port will be cleaned carefully with a germ-killing soap and allowed to dry.  · Your health care provider will gently pinch the port and insert a needle into it.  · Your health care provider will check for a blood return to make sure the port is in the vein and is not clogged.  · If your port needs to remain accessed to get medicine continuously (constant infusion), your health care provider will place a clear bandage (dressing) over the needle site. The dressing and needle will need to be changed every week, or as told by your health care provider.  What is flushing?  Flushing helps keep the port from getting clogged. Follow instructions from your health care provider about how and when to flush the port. Ports are usually flushed with saline solution or a medicine called heparin. The need for flushing will depend on how the port is used:  · If the port is only used from time to time to give medicines or draw blood, the port may need to be flushed:  ? Before and after medicines have been given.  ? Before and after blood has been drawn.  ? As part of routine maintenance. Flushing may be recommended every 4-6 weeks.  · If a constant infusion is running, the port may not need to be flushed.  · Throw away any syringes in a disposal container that is meant for sharp items (sharps container). You can buy a sharps container from a pharmacy, or you can make one by using an empty hard plastic bottle with a cover.  How long will my port stay implanted?  The port can stay in for as long as your health care provider thinks it is needed. When it is time for the port to come out, a surgery will be done to remove it. The surgery will be similar to the procedure that was done to put the port in.  Follow these instructions at home:    · Flush your port as told by your health care provider.  · If you need an infusion over several days, follow instructions from your health care provider about how to take  care of your port site. Make sure you:  ? Wash your hands with soap and water before you change your dressing. If soap and water are not available, use alcohol-based hand .  ? Change your dressing as told by your health care provider.  ? Place any used dressings or infusion bags into a plastic bag. Throw that bag in the trash.  ? Keep the dressing that covers the needle clean and dry. Do not get it wet.  ? Do not use scissors or sharp objects near the tube.  ? Keep the tube clamped, unless it is being used.  · Check your port site every day for signs of infection. Check for:  ? Redness, swelling, or pain.  ? Fluid or blood.  ? Pus or a bad smell.  · Protect the skin around the port site.  ? Avoid wearing bra straps that rub or irritate the site.  ? Protect the skin around your port from seat belts. Place a soft pad over your chest if needed.  · Bathe or shower as told by your health care provider. The site may get wet as long as you are not actively receiving an infusion.  · Return to your normal activities as told by your health care provider. Ask your health care provider what activities are safe for you.  · Carry a medical alert card or wear a medical alert bracelet at all times. This will let health care providers know that you have an implanted port in case of an emergency.  Get help right away if:  · You have redness, swelling, or pain at the port site.  · You have fluid or blood coming from your port site.  · You have pus or a bad smell coming from the port site.  · You have a fever.  Summary  · Implanted ports are usually placed in the chest for long-term IV access.  · Follow instructions from your health care provider about flushing the port and changing bandages (dressings).  · Take care of the area around your port by avoiding clothing that puts pressure on the area, and by watching for signs of infection.  · Protect the skin around your port from seat belts. Place a soft pad over your chest if  needed.  · Get help right away if you have a fever or you have redness, swelling, pain, drainage, or a bad smell at the port site.  This information is not intended to replace advice given to you by your health care provider. Make sure you discuss any questions you have with your health care provider.  Document Released: 12/18/2006 Document Revised: 04/10/2020 Document Reviewed: 01/20/2018  [employer] Patient Education © 2020 [employer]  *    DIET: To avoid nausea, slowly advance diet as tolerated, avoiding spicy or greasy foods for the first day.  Add more substantial food to your diet according to your physician's instructions.  Babies can be fed formula or breast milk as soon as they are hungry.  INCREASE FLUIDS AND FIBER TO AVOID CONSTIPATION.    SURGICAL DRESSING/BATHING: *may shower in 48 hours**    FOLLOW-UP APPOINTMENT:  A follow-up appointment should be arranged with your doctor; call to schedule Dr. Jorge [phone] .    You should CALL YOUR PHYSICIAN if you develop:  Fever greater than 101 degrees F.  Pain not relieved by medication, or persistent nausea or vomiting.  Excessive bleeding (blood soaking through dressing) or unexpected drainage from the wound.  Extreme redness or swelling around the incision site, drainage of pus or foul smelling drainage.  Inability to urinate or empty your bladder within 8 hours.  Problems with breathing or chest pain.    You should call 911 if you develop problems with breathing or chest pain.  If you are unable to contact your doctor or surgical center, you should go to the nearest emergency room or urgent care center.  Physician's telephone #: Dr. Jorge [phone]    If any questions arise, call your doctor.  If your doctor is not available, please feel free to call the Surgical Center at [phone].     A registered nurse may call you a few days after your surgery to see how you are doing after your procedure.    MEDICATIONS: Resume taking daily medication.   Take prescribed pain medication with food.  If no medication is prescribed, you may take non-aspirin pain medication if needed.  PAIN MEDICATION CAN BE VERY CONSTIPATING.  Take a stool softener or laxative such as senokot, pericolace, or milk of magnesia if needed.    Last pain medication given at 8:22 (Toradol 30 mg in OR).  No prescriptions to go home with or .    If your physician has prescribed pain medication that includes Acetaminophen (Tylenol), do not take additional Acetaminophen (Tylenol) while taking the prescribed medication.    Depression / Suicide Risk    As you are discharged from this [facility] facility, it is important to learn how to keep safe from harming yourself.    Recognize the warning signs:  · Abrupt changes in personality, positive or negative- including increase in energy   · Giving away possessions  · Change in eating patterns- significant weight changes-  positive or negative  · Change in sleeping patterns- unable to sleep or sleeping all the time   · Unwillingness or inability to communicate  · Depression  · Unusual sadness, discouragement and loneliness  · Talk of wanting to die  · Neglect of personal appearance   · Rebelliousness- reckless behavior  · Withdrawal from people/activities they love  · Confusion- inability to concentrate     If you or a loved one observes any of these behaviors or has concerns about self-harm, here's what you can do:  · Talk about it- your feelings and reasons for harming yourself  · Remove any means that you might use to hurt yourself (examples: pills, rope, extension cords, firearm)  · Get professional help from the community (Mental Health, Substance Abuse, psychological counseling)  · Do not be alone:Call your Safe Contact- someone whom you trust who will be there for you.  · Call your local CRISIS HOTLINE 965-8666 or 936-786-0324  · Call your local Children's Mobile Crisis Response Team [place] (629) 532-4444 or  www.[employer]  · Call the toll free National Suicide Prevention Hotlines   · National Suicide Prevention Lifeline 622-029-DBDZ (9513)  · National Hope Line Network 800-SUICIDE (622-4717)

## 2021-12-31 NOTE — OR NURSING
0945 - Pt dressed.  DC instructions reviewed w/pt, questions answered.  DC criteria met.  Waiting on ride.

## 2021-12-31 NOTE — OR NURSING
0832 Pt to PACU from OR. Report from anesthesia and OR RN. Alert and oriented on arrival to PACU, on RA. Respirations even and unlabored. VSS. R chest incision with dermabond, CDI. Declines pain/nausea.     0859 Declines pain/nausea, will move to [facility] shortly.     0908 Report called to CAMMY Slater. Moved to phase II by mirna, ambulates steady gait to chair. Called daughter for .

## 2021-12-31 NOTE — ANESTHESIA TIME REPORT
Anesthesia Start and Stop Event Times     Date Time Event    12/31/2021 0731 Ready for Procedure     0741 Anesthesia Start     0834 Anesthesia Stop        Responsible Staff  12/31/21    Name Role Begin End    Vladimir Salinas M.D. Anesth 0741 0834        Preop Diagnosis (Free Text):  Pre-op Diagnosis     PRIMARY MALIGNANT NEOPLASM OF UPPER OUTER QUADRANT OF FEMALE BREAST        Preop Diagnosis (Codes):    Premium Reason  F. Holiday    Comments:

## 2022-01-06 ENCOUNTER — HOSPITAL ENCOUNTER (OUTPATIENT)
Dept: RADIOLOGY | Facility: MEDICAL CENTER | Age: 57
End: 2022-01-06
Payer: COMMERCIAL

## 2022-01-06 DIAGNOSIS — C50.512 MALIGNANT NEOPLASM OF LOWER-OUTER QUADRANT OF LEFT FEMALE BREAST, UNSPECIFIED ESTROGEN RECEPTOR STATUS (HCC): ICD-10-CM

## 2022-01-06 PROCEDURE — A9576 INJ PROHANCE MULTIPACK: HCPCS

## 2022-01-06 PROCEDURE — 72157 MRI CHEST SPINE W/O & W/DYE: CPT

## 2022-01-06 PROCEDURE — 700117 HCHG RX CONTRAST REV CODE 255

## 2022-01-06 RX ADMIN — GADOTERIDOL 15 ML: 279.3 INJECTION, SOLUTION INTRAVENOUS at 18:14

## 2022-01-07 ENCOUNTER — HOSPITAL ENCOUNTER (OUTPATIENT)
Dept: RADIOLOGY | Facility: MEDICAL CENTER | Age: 57
End: 2022-01-07
Attending: INTERNAL MEDICINE
Payer: COMMERCIAL

## 2022-01-07 DIAGNOSIS — R92.8 ABNORMAL MAMMOGRAM: ICD-10-CM

## 2022-01-07 PROCEDURE — 76642 ULTRASOUND BREAST LIMITED: CPT | Mod: RT

## 2022-01-18 ENCOUNTER — NON-PROVIDER VISIT (OUTPATIENT)
Dept: HEMATOLOGY ONCOLOGY | Facility: MEDICAL CENTER | Age: 57
End: 2022-01-18
Payer: COMMERCIAL

## 2022-01-18 DIAGNOSIS — Z80.3 FAMILY HISTORY OF BREAST CANCER IN MOTHER: ICD-10-CM

## 2022-01-18 DIAGNOSIS — Z98.890 HISTORY OF RIGHT BREAST BIOPSY: ICD-10-CM

## 2022-01-18 DIAGNOSIS — Z86.69 HISTORY OF RETINAL DETACHMENT: ICD-10-CM

## 2022-01-18 DIAGNOSIS — N63.20 BREAST MASS, LEFT: ICD-10-CM

## 2022-01-18 DIAGNOSIS — Z98.890 HISTORY OF LUMPECTOMY: ICD-10-CM

## 2022-01-18 PROCEDURE — 99999 PR NO CHARGE: CPT | Performed by: INTERNAL MEDICINE

## 2022-01-18 NOTE — PROGRESS NOTES
Patient requested to do saliva instead of a blood draw due to being drawn multiple times in each ac and once in one of her wrists. Ensured patient that results are just as accurate. Instructed her on how to complete a successful saliva sample to send off to [facility].     Patient agreed and verbalized understanding.

## 2022-01-20 ENCOUNTER — PATIENT OUTREACH (OUTPATIENT)
Dept: OTHER | Facility: MEDICAL CENTER | Age: 57
End: 2022-01-20

## 2022-01-20 DIAGNOSIS — Z65.8 PSYCHOSOCIAL DISTRESS: ICD-10-CM

## 2022-01-20 NOTE — PROGRESS NOTES
On January 20, 2022, Oncology Social Worker Leslie Ricardo contacted pt. back to inform her she had scheduled an appointment for her for tomorrow January 21st, 2022 at 9 am with Dr. Miladis Aguilar.  Pt. thanked [employer] Ricardo for her assistance in getting her scheduled.

## 2022-01-20 NOTE — PROGRESS NOTES
"On January 20, 2022, Oncology Social Worker Leslie Ricardo contacted pt. via telephone after receiving referral for pt. for mental health resources.   Pt. shared she has been to a therapist in the past and stated she does not have a mental health diagnosis.  Pt. shared she was interested in counseling as she does not want \"to keep using her daughter for her therapist.\"      Pt. 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      Pt. lives alone with two cats.  Pt. shared she works full time and is starting school next week.  Pt. shared she is attending [employer] to finish her degree.      Pt. gave OSW Davion permission to get her scheduled for counseling through [facility].      "

## 2022-01-21 ENCOUNTER — TELEMEDICINE (OUTPATIENT)
Dept: BEHAVIORAL HEALTH | Facility: CLINIC | Age: 57
End: 2022-01-21
Payer: COMMERCIAL

## 2022-01-21 DIAGNOSIS — F43.23 ADJUSTMENT DISORDER WITH MIXED ANXIETY AND DEPRESSED MOOD: ICD-10-CM

## 2022-01-21 PROCEDURE — 90791 PSYCH DIAGNOSTIC EVALUATION: CPT | Performed by: PSYCHOLOGIST

## 2022-01-21 NOTE — PROGRESS NOTES
"..  [facility] BEHAVIORAL HEALTH  PSYCHOTHERAPY INITIAL ASSESSMENT    This evaluation was conducted via Zoom using secure and encrypted videoconferencing technology. The patient was in a private location in the [facility].    The patient's identity was confirmed and verbal consent was obtained for this virtual visit. Patient's identity and location was verified. Therapist reviewed limits of confidentiality. Therapist verbally reviewed informed consent for therapy due to session being conducted virtually. Therapist discussed risks/benefits and expectations for services. Patient provided verbal consent for psychotherapy services.       Name: Melva Sauceda  MRN: [id number]  : 1965  Age: 56 y.o.  Date of assessment: 2022  PCP: LISANDRO Forde  Persons in attendance: Patient  Total session time: 45minutes        CHIEF COMPLAINT AND HISTORY OF PRESENTING PROBLEM:    Melva Sauceda is a 56 y.o., White female referred for assessment by her Oncologist. 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  She had surgery on her foot and didn't have her normal outlets (running, exercise). That started some depression.  Then she was diagnosed with stage 4 breast cancer. It has been a difficult journey. She is experiencing depression and anxiety on some level. Assessed for depression and she gets tearfulness/sadness, no hopelessness/worthlessness, no lack of energy/fatigue (had first dose of chemo yesterday), memory is ok, appetite is \"fine.\" Concentration and focus are \"good.\" Had a little bit of post pardem depression after the birth of her first child, when getting out of her divorce. Depression has been situational. No suicidal thoughts. Some anxiety reported includes waking up early (wakes up at 3:00am). No real anxiety in the past. Just worries about her current situation.       RELEVANT MEDICATIONS:  Ambien once in a while    BEHAVIORAL " HEALTH TREATMENT HISTORY    Does patient report a history of prior behavioral health treatment? Yes. When she had her foot surgery she went to somewhere else for 6x.  When and what for?     FAMILY/SOCIAL HISTORY    Marital Status: Single/  # Of Children: 3 grown 2 daughters and a son  Current living situation/household members: Lives by herself  Pets: 2 cats  Family of origin history / siblings: mother in [place], has a brother who's a Dr and a sister [facility] that has cancer  Current Stressors: trying to finish her Bachelor degree in nursing. Will be working, doing school and clinical.  Finances are an issue. Family on [facility] wants her to move. Mother is in assisted living. Daughter in [place] wants her to stay in [place].     Support System:  Daughter locally, disabled sister [place] (they talk an hour per day), has other friend support and ex co-workers.    Family History of mental health issues? Yes  Who and what type:      MEDICAL HISTORY    Current medical issues: See Medical Chart      Past medical/surgical history:   Past Medical History:   Diagnosis Date   • Cancer (HCC) 12/2021    Breast cancer - left   • Cataract       Past Surgical History:   Procedure Laterality Date   • PB ULTRASONIC GUIDANCE, INTRAOPERATIVE Right 12/31/2021    Procedure: ULTRASOUND GUIDANCE;  Surgeon: Shane Jorge M.D.;  Location: SURGERY [facility];  Service: General   • CATH PLACEMENT Right 12/31/2021    Procedure: INSERTION, CATHETER - PORT A;  Surgeon: Shane Jorge M.D.;  Location: SURGERY [facility];  Service: General   • BUNIONECTOMY Right           NUTRITION ISSUES    Does patient report any current nutritional concerns? No  Does patient report change in appetite or weight loss/gain? No  Does patient report history of eating disorder symptoms? No      PAIN ISSUES  Does patient report physical pain? No  Indicate if pain is acute or chronic, and location: N/A  Pain scale rating:       Does patient/parent report  functional impairment from medical, developmental, or pain issues?   no    Primary Care Behavioral Health Screenings Given? No        EDUCATIONAL/LEARNING HISTORY    Does patient report any history of current developmental concerns or Special Education ? No  Did the patient graduate high school? Yes  Did the patient attend college? Yes   Did the patient Graduate College? Yes  Degree? Has an AA and an LVM got her nursing degree.  Is patient currently attending a school or program?  Is currently wanting to finish her Bachelor degree.     EMPLOYMENT/RESOURCES    Is the patient currently employed? Yes  History of employment: Works for [employer]. Started there in November 2021.  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    Does the patient/parent report adequate financial resources? Yes  Does patient identify impact of presenting issue on work functioning? No  Work or income-related stressors:    Disabled?:      HISTORY:    [x] Patient denies any  history.       SUBSTANCE USE HISTORY    ALCOHOL    [] Patient denies use of any alcohol    Does patient use alcohol:Yes  If yes how much?   Within the past week? Yes  Last time patient used alcohol: Has one glass of etoh daily.  Does the patient feel alcohol use is a problem:No      SUBSTANCES    [] Patient denies use of any Illicit or Street Drugs      Does patient use illicit or street drugs?No   Illicit drug use in the past?No   Last illicit drug use:  Has substance use/dependence ever been a problem? No   Any use of prescription medications/pills without a prescription, or for reasons others than originally prescribed?  No    Marijuana or marijuana products? Yes. Uses it for nausea.  Last time patient used THC products:     Is there a family history of substance use/addiction? Yes  If so who?    Any other addictive behavior reported (gambling, shopping, sex)? No   What consequences does the patient  associate with any of the above substance use and or addictive behaviors? None    SMOKING    [x] Patient denies use of any tobacco products      SPIRITUAL/CULTURAL/IDENTITY:    What are the patient’s/family’s spiritual beliefs or practices? Grew up Taoist. Prays daily    What is the patient’s cultural or ethnic background/identity? White  How does the patient identify their sexual orientation? Straight  How does the patient identify their gender? Female  Does the patient identify any spiritual/cultural/identity factors as relevant to the presenting issue? No    LEGAL HISTORY    [x] Patient denies any current or former legal problems    ABUSE/NEGLECT/TRAUMA SCREENING    Does patient report feeling “unsafe” in his/her home, or afraid of anyone? No  Does patient report any history of physical, sexual, or emotional abuse? No  Does the patient report any other history of potentially traumatic life events? Yes   Based on the information provided during the current assessment, is a mandated report of suspected abuse/neglect being made?  No     SAFETY ASSESSMENT - SELF    Does patient acknowledge current or past symptoms of dangerousness to self? No    Recent change in frequency/specificity/intensity of suicidal thoughts or self-harm behavior? No  Current access to firearms, medications, or other identified means of suicide/self-harm? No  If yes, willing to restrict access to means of suicide/self-harm? No  Protective factors present: N/A    Current Suicide Risk: Low  Crisis Safety Plan completed and copy given to patient: No    SAFETY ASSESSMENT - OTHERS    [x] Patient denies any thoughts or plan of harm to anyone      Current Homicide Risk:  Low  Crisis Safety Plan completed and copy given to patient? No  Based on information provided during the current assessment, is a mandated “duty to warn” being exercised? No      HOBBIES/INTERESTS:   Loves going for runs but can't run now so she walks, lifting weights,      Patient’s expectations in psychotherapy:   Is/was an athlete    STRENGTHS/ASSETS    Strengths Identified by interviewer: Insight into problems, Evidence of good judgement, Self-awareness, Optimism and Problem-solving skills  Strengths Identified by patient:  Is a go getter, strong minded, planner,       MENTAL STATUS/OBSERVATIONS:     Participation: Active verbal participation  Grooming: Casual  Orientation:Alert and Fully Oriented   Behavior: Calm  Eye contact: Good   Mood:Euthymic  Affect:Congruent with content  Thought process: Logical and Goal-directed  Thought content:  Within normal limits  Speech: Rate within normal limits and Volume within normal limits  Perception: Within normal limits  Memory: No gross evidence of memory deficits  Insight: Good  Judgment:  Good  Other:       CLINICAL FORMULATION:   Patient is a 56 y.o. year old female presenting to [facility] for symptoms related to depression and some mild anxiety.  She was referred by her oncologist to help her manage symptoms related to her diagnosis of cancer. She is not prescribed any psychotropics but is prescribed Ambien to help her sleep at times.  She has received therapy 1x in the past after a surgery. Other than that she does not have a history of mental health treatment.  She is single/ with three grown children.  She is currently attending a Bachelor's program and is working steadily.  She denied the use of tobacco but does use alcohol on occasion and uses marijuana to help her with nausea.  Pt is Oriented x4, and mental status is WNL. There were no perceptual disturbance and pt. Denied AH/VH or delusions. Pt. Denied SI or HI, with no prior suicide attempts.  Patient has some positive coping strategies and a good support system.     This is an exceptional individual.      DIAGNOSTIC IMPRESSION(S):  1. Adjustment disorder with mixed anxiety and depressed mood          Does patient express agreement with the above plan?  Yes     Referral or follow up appointment(s) scheduled? Yes Patient given instructions on how to schedule further appointments. Please call [phone] to reschedule.        Miladis Aguilar Psy.D  Licensed Psychologist

## 2022-02-07 ENCOUNTER — HOSPITAL ENCOUNTER (OUTPATIENT)
Dept: RADIOLOGY | Facility: MEDICAL CENTER | Age: 57
End: 2022-02-07
Attending: INTERNAL MEDICINE
Payer: COMMERCIAL

## 2022-02-07 DIAGNOSIS — R92.8 ABNORMAL MAMMOGRAM: ICD-10-CM

## 2022-02-16 PROBLEM — F43.23 ADJUSTMENT DISORDER WITH MIXED ANXIETY AND DEPRESSED MOOD: Status: ACTIVE | Noted: 2022-02-16

## 2022-04-08 ENCOUNTER — TELEMEDICINE (OUTPATIENT)
Dept: BEHAVIORAL HEALTH | Facility: CLINIC | Age: 57
End: 2022-04-08
Payer: COMMERCIAL

## 2022-04-08 DIAGNOSIS — F43.23 ADJUSTMENT DISORDER WITH MIXED ANXIETY AND DEPRESSED MOOD: ICD-10-CM

## 2022-04-08 PROCEDURE — 90837 PSYTX W PT 60 MINUTES: CPT | Mod: 95 | Performed by: PSYCHOLOGIST

## 2022-04-08 NOTE — PROGRESS NOTES
...  [facility]   Psychotherapy Progress Note      This visit was conducted via Zoom using secure and encrypted videoconferencing technology.  The patient was in her home in the [facility].  The patient's identity was confirmed and verbal consent was obtained for this virtual visit.      Name: Melva Sauceda  MRN: [id number]  : 1965  Age: 56 y.o.  Date of assessment: 2022  PCP: LISANDRO Forde  Persons in attendance: Patient  Total session time: 54 minutes    Session took place on 22 but was not entered until 22 due to paperwork.    Topics addressed in psychotherapy include: Today's session covered the topic of moods and triggers to them. Discussed strategies that have helped.   Therapist provided validation, support and feedback.  Therapist also provided a new tool for patient to utilize. The patient was encouraged to utilize the tool often at home and other settings.  There was no SI.        Objective Observations:   Participation:Active verbal participation   Grooming:Casual   Cognition:Alert and Fully Oriented   Eye Contact:Good   Mood:Euthymic   Affect:Congruent with content   Thought Process:Logical and Goal-directed   Speech:Rate within normal limits and Volume within normal limits    Current Risk:   Suicide: low   Homicide: low   Self-Harm: low   Relapse:    Safety Plan Needed:       Care Plan Updated: No    Does patient express agreement with the above plan? Yes     Diagnosis:  1. Adjustment disorder with mixed anxiety and depressed mood        Follow up appointment scheduled? Yes       Miladis Aguilar PsyD

## 2022-04-14 ENCOUNTER — HOSPITAL ENCOUNTER (OUTPATIENT)
Dept: CARDIOLOGY | Facility: MEDICAL CENTER | Age: 57
End: 2022-04-14
Attending: INTERNAL MEDICINE
Payer: COMMERCIAL

## 2022-04-14 DIAGNOSIS — C50.512 MALIGNANT NEOPLASM OF LOWER-OUTER QUADRANT OF LEFT FEMALE BREAST, UNSPECIFIED ESTROGEN RECEPTOR STATUS (HCC): ICD-10-CM

## 2022-04-14 LAB
LV EJECT FRACT  99904: 70
LV EJECT FRACT MOD 2C 99903: 76.26
LV EJECT FRACT MOD 4C 99902: 76.83
LV EJECT FRACT MOD BP 99901: 77.85

## 2022-04-14 PROCEDURE — 93306 TTE W/DOPPLER COMPLETE: CPT

## 2022-04-14 PROCEDURE — 93306 TTE W/DOPPLER COMPLETE: CPT | Mod: 26 | Performed by: INTERNAL MEDICINE

## 2022-05-02 ENCOUNTER — OFFICE VISIT (OUTPATIENT)
Dept: CARDIOLOGY | Facility: MEDICAL CENTER | Age: 57
End: 2022-05-02
Payer: COMMERCIAL

## 2022-05-02 VITALS
DIASTOLIC BLOOD PRESSURE: 62 MMHG | HEART RATE: 72 BPM | OXYGEN SATURATION: 98 % | SYSTOLIC BLOOD PRESSURE: 110 MMHG | WEIGHT: 159 LBS | RESPIRATION RATE: 16 BRPM | HEIGHT: 68 IN | BODY MASS INDEX: 24.1 KG/M2

## 2022-05-02 DIAGNOSIS — R93.1 ABNORMAL ECHOCARDIOGRAM: ICD-10-CM

## 2022-05-02 DIAGNOSIS — C50.912 MALIGNANT NEOPLASM OF LEFT FEMALE BREAST, UNSPECIFIED ESTROGEN RECEPTOR STATUS, UNSPECIFIED SITE OF BREAST (HCC): ICD-10-CM

## 2022-05-02 DIAGNOSIS — E78.5 DYSLIPIDEMIA: ICD-10-CM

## 2022-05-02 LAB — EKG IMPRESSION: NORMAL

## 2022-05-02 PROCEDURE — 93000 ELECTROCARDIOGRAM COMPLETE: CPT | Performed by: INTERNAL MEDICINE

## 2022-05-02 PROCEDURE — 99244 OFF/OP CNSLTJ NEW/EST MOD 40: CPT | Performed by: INTERNAL MEDICINE

## 2022-05-02 RX ORDER — LORATADINE 10 MG/1
TABLET ORAL
COMMUNITY
Start: 2022-04-12 | End: 2022-05-02

## 2022-05-02 RX ORDER — CLINDAMYCIN PHOSPHATE 10 UG/ML
LOTION TOPICAL
COMMUNITY
Start: 2022-04-12 | End: 2022-05-02

## 2022-05-02 RX ORDER — ONDANSETRON HYDROCHLORIDE 8 MG/1
TABLET, FILM COATED ORAL
COMMUNITY
Start: 2022-01-20 | End: 2022-07-11

## 2022-05-02 RX ORDER — DEXAMETHASONE 4 MG/1
TABLET ORAL
COMMUNITY
Start: 2022-01-19 | End: 2022-07-11

## 2022-05-02 RX ORDER — HYDROXYZINE HYDROCHLORIDE 25 MG/1
25 TABLET, FILM COATED ORAL 3 TIMES DAILY PRN
COMMUNITY
Start: 2022-04-12 | End: 2023-10-25

## 2022-05-02 RX ORDER — ONDANSETRON 8 MG/1
TABLET, ORALLY DISINTEGRATING ORAL
COMMUNITY
Start: 2022-02-04 | End: 2022-05-02

## 2022-05-02 RX ORDER — TRIAMCINOLONE ACETONIDE 1 MG/G
CREAM TOPICAL
COMMUNITY
Start: 2022-04-12 | End: 2022-07-11

## 2022-05-02 RX ORDER — DEXAMETHASONE 4 MG/1
TABLET ORAL
COMMUNITY
Start: 2022-03-10 | End: 2022-05-02

## 2022-05-02 RX ORDER — PROCHLORPERAZINE MALEATE 5 MG/1
TABLET ORAL
COMMUNITY
Start: 2022-01-20 | End: 2022-07-11

## 2022-05-02 ASSESSMENT — ENCOUNTER SYMPTOMS
DIZZINESS: 0
FLANK PAIN: 0
PALPITATIONS: 0
IRREGULAR HEARTBEAT: 0
NIGHT SWEATS: 0
MYALGIAS: 0
NEAR-SYNCOPE: 0
SYNCOPE: 0
SORE THROAT: 0
LOSS OF BALANCE: 0
FALLS: 0
WEAKNESS: 0
HEARTBURN: 1
EXCESSIVE DAYTIME SLEEPINESS: 0
FEVER: 0
DYSPNEA ON EXERTION: 0
COUGH: 0
NAUSEA: 0
CONSTIPATION: 0
LIGHT-HEADEDNESS: 0
SHORTNESS OF BREATH: 0
DIAPHORESIS: 0
DECREASED APPETITE: 0
VOMITING: 0
DIARRHEA: 0
PND: 0
HEADACHES: 0
PARESTHESIAS: 0
NUMBNESS: 0
BACK PAIN: 0
WHEEZING: 0
BLURRED VISION: 0
ORTHOPNEA: 0
SLEEP DISTURBANCES DUE TO BREATHING: 0
DOUBLE VISION: 0
BLOATING: 0

## 2022-05-02 ASSESSMENT — FIBROSIS 4 INDEX: FIB4 SCORE: 1.29

## 2022-05-02 NOTE — PROGRESS NOTES
Cardiology Initial Consultation Note    Date of note:    5/2/2022    Primary Care Provider: BENEDICT Forde.  Referring Provider: BENEDICT Forde    Patient Name: Melva Sauceda   YOB: 1965  MRN:              [id number]    Chief Complaint   Patient presents with   • Other     NP Dx: Metastatic breast cancer       History of Present Illness: Ms. Melva Sauceda is a 56 y.o. female whose current medical problems include metastatic breast cancer currently on chemotherapy who is here for cardiac consultation for abnormal echocardiogram.    Patient is currently on chemotherapy for left breast cancer.  Had baseline echocardiogram in December 2021 which showed mildly dilated left atrium.  Repeat echocardiogram from April 2022 was read as severely dilated left atrium for which she is referred to cardiology.     In terms of physical activity, is an avid athlete.  Runs marathons without any chest pain/pressure or dyspnea.    Cardiovascular Risk Factors:  1. Smoking status: Never smoker  2. Type II Diabetes Mellitus: no   3. Hypertension: no  4. Dyslipidemia:    Cholesterol,Tot   Date Value Ref Range Status   11/19/2021 222 (H) 100 - 199 mg/dL Final     LDL   Date Value Ref Range Status   11/19/2021 123 (H) <100 mg/dL Final     HDL   Date Value Ref Range Status   11/19/2021 86 >=40 mg/dL Final     Triglycerides   Date Value Ref Range Status   11/19/2021 66 0 - 149 mg/dL Final     5. Family history of early Coronary Artery Disease in a first degree relative (Male less than 55 years of age; Female less than 65 years of age): Denies  6.  Obesity and/or Metabolic Syndrome: no  7. Sedentary lifestyle: no    Review of Systems   Constitutional: Negative for decreased appetite, diaphoresis, fever, malaise/fatigue and night sweats.   HENT: Positive for nosebleeds. Negative for congestion and sore throat.    Eyes: Negative for blurred vision and double vision.   Cardiovascular: Negative for chest pain,  cyanosis, dyspnea on exertion, irregular heartbeat, leg swelling, near-syncope, orthopnea, palpitations, paroxysmal nocturnal dyspnea and syncope.   Respiratory: Negative for cough, shortness of breath, sleep disturbances due to breathing and wheezing.    Endocrine: Negative for cold intolerance and heat intolerance.   Musculoskeletal: Negative for back pain, falls and myalgias.   Gastrointestinal: Positive for heartburn. Negative for bloating, constipation, diarrhea, nausea and vomiting.   Genitourinary: Negative for dysuria and flank pain.   Neurological: Negative for excessive daytime sleepiness, dizziness, headaches, light-headedness, loss of balance, numbness, paresthesias and weakness.         Past Medical History:   Diagnosis Date   • Cancer (HCC) 12/2021    Breast cancer - left   • Cataract          Past Surgical History:   Procedure Laterality Date   • MI ULTRASONIC GUIDANCE, INTRAOPERATIVE Right 12/31/2021    Procedure: ULTRASOUND GUIDANCE;  Surgeon: Shane Jorge M.D.;  Location: SURGERY [facility];  Service: General   • CATH PLACEMENT Right 12/31/2021    Procedure: INSERTION, CATHETER - PORT A;  Surgeon: Shane Jorge M.D.;  Location: SURGERY [facility];  Service: General   • BUNIONECTOMY Right          Current Outpatient Medications   Medication Sig Dispense Refill   • prochlorperazine (COMPAZINE) 5 MG Tab      • dexamethasone (DECADRON) 4 MG Tab      • hydrOXYzine HCl (ATARAX) 25 MG Tab TAKE 1 TAB BY MOUTH 3 TIMES DAILY AS NEEDED FOR SEVERE RASH/ITCHING     • ondansetron (ZOFRAN) 8 MG Tab      • triamcinolone acetonide (KENALOG) 0.1 % Cream APPLY A THIN LAYER TO AFFECTED AREA TWICE A DAY AS NEEDED FOR REDNESS, RASH, ITCHING       No current facility-administered medications for this visit.         No Known Allergies      Family History   Problem Relation Age of Onset   • Cancer Mother         breast and bladder   • Hypertension Mother    • Arrythmia Mother          Social History     Socioeconomic  "History   • Marital status: Single     Spouse name: Not on file   • Number of children: Not on file   • Years of education: Not on file   • Highest education level: Not on file   Occupational History   • Not on file   Tobacco Use   • Smoking status: Never Smoker   • Smokeless tobacco: Never Used   Vaping Use   • Vaping Use: Never used   Substance and Sexual Activity   • Alcohol use: Yes     Comment: 1 drink per day   • Drug use: Never   • Sexual activity: Not Currently   Other Topics Concern   • Not on file   Social History Narrative   • Not on file     Social Determinants of Health     Financial Resource Strain: Not on file   Food Insecurity: Not on file   Transportation Needs: Not on file   Physical Activity: Not on file   Stress: Not on file   Social Connections: Not on file   Intimate Partner Violence: Not on file   Housing Stability: Not on file         Physical Exam:  Ambulatory Vitals  /62 (BP Location: Left arm, Patient Position: Sitting, BP Cuff Size: Adult)   Pulse 72   Resp 16   Ht 1.727 m (5' 8\")   Wt 72.1 kg (159 lb)   SpO2 98%    Oxygen Therapy:  Pulse Oximetry: 98 %  BP Readings from Last 4 Encounters:   05/02/22 110/62   12/31/21 122/67   11/16/21 130/82   09/11/21 138/62       Weight/BMI: Body mass index is 24.18 kg/m².  Wt Readings from Last 4 Encounters:   05/02/22 72.1 kg (159 lb)   12/31/21 74.8 kg (164 lb 14.5 oz)   11/16/21 73.7 kg (162 lb 7.7 oz)   09/11/21 72 kg (158 lb 11.7 oz)       General: No acute distress. Well nourished.  HEENT: EOM grossly intact, no scleral icterus, no pharyngeal erythema.   Neck:  Trachea midline, no visible masses  CVS: RRR. No JVD at 90  Resp: Normal respiratory effort. Normal appearing chest.  Abdomen: Not overtly obese.  MSK/Ext: No clubbing or cyanosis.  Skin: Dry appearing, no rashes.  Neurological: CN III-XII grossly intact. No focal deficits.   Psych: A&O x 3, appropriate affect, good judgement      Lab Data Review:  Lab Results   Component Value " Date/Time    CHOLSTRLTOT 222 (H) 11/19/2021 07:25 AM     (H) 11/19/2021 07:25 AM    HDL 86 11/19/2021 07:25 AM    TRIGLYCERIDE 66 11/19/2021 07:25 AM       Lab Results   Component Value Date/Time    SODIUM 142 12/17/2021 09:55 AM    POTASSIUM 4.2 12/17/2021 09:55 AM    CHLORIDE 105 12/17/2021 09:55 AM    CO2 28 12/17/2021 09:55 AM    GLUCOSE 89 12/17/2021 09:55 AM    BUN 16 12/17/2021 09:55 AM    CREATININE 0.55 12/17/2021 09:55 AM     Lab Results   Component Value Date/Time    ALKPHOSPHAT 53 12/17/2021 09:55 AM    ASTSGOT 25 12/17/2021 09:55 AM    ALTSGPT 18 12/17/2021 09:55 AM    TBILIRUBIN 0.5 12/17/2021 09:55 AM      Lab Results   Component Value Date/Time    WBC 6.0 12/17/2021 09:55 AM         Cardiac Imaging and Procedures Review:    EKG dated 5/2/2022: My personal interpretation is sinus rhythm    Echo dated 4/14/2022:   My personal interpretation is normal left ventricular size and function with mildly dilated LA      Radiology test Review:  CT PET (12/30/2021): IMPRESSION:  1. Hypermetabolic dominant mass in the lower outer left breast, in keeping with known malignancy.  2. A smaller mass adjacently as well as additional small mass more medially in the left breast, concerning for multicentric disease.   3. Multiple lymph node metastases in the left axilla. Additional lymph node metastasis in the left supraclavicular and left internal mammary regions.   4. Increased uptake in the right T6 vertebral body, likely metastasis.      Assessment & Plan     1. Malignant neoplasm of left female breast, unspecified estrogen receptor status, unspecified site of breast (HCC)  EKG   2. Abnormal echocardiogram     3. Dyslipidemia           Shared Medical Decision Making:  Reviewed echocardiogram images with the patient in extensive detail.  When images compared to prior echocardiogram, left atrium size does not seem to be severely dilated and at most is mildly dilated.  LV function is preserved as well with no wall  motion abnormality.    Discussed lipid panel results with the patient which shows LDL above goal.  Discussed lifestyle modification with low-fat/low-cholesterol diet.  Can do a coronary calcium score if LDL does not improve to evaluate for cholesterol plaque buildup.      All of patient's excellent questions were answered to the best of my knowledge and to her satisfaction.  It was a pleasure seeing Ms. Melva Sauceda in my clinic today. Return if symptoms worsen or fail to improve. Patient is aware to call the cardiology clinic with any questions or concerns.      Ben Davis MD  [facility]  [facility], [facility]  [address] [address]  Phone: [phone]  Fax: [phone]    Please note that this dictation was created using voice recognition software. I have made every reasonable attempt to correct obvious errors, but it is possible there are errors of grammar and possibly content that I did not discover before finalizing the note.

## 2022-05-09 ENCOUNTER — TELEMEDICINE (OUTPATIENT)
Dept: BEHAVIORAL HEALTH | Facility: CLINIC | Age: 57
End: 2022-05-09
Payer: COMMERCIAL

## 2022-05-09 DIAGNOSIS — F43.23 ADJUSTMENT DISORDER WITH MIXED ANXIETY AND DEPRESSED MOOD: ICD-10-CM

## 2022-05-09 PROCEDURE — 90837 PSYTX W PT 60 MINUTES: CPT | Mod: 95 | Performed by: PSYCHOLOGIST

## 2022-05-09 NOTE — PROGRESS NOTES
..  [facility] Behavioral [facility]   Psychotherapy Progress Note      This visit was conducted via Zoom using secure and encrypted videoconferencing technology.  The patient was in her private office in the [facility].  The patient's identity was confirmed and verbal consent was obtained for this virtual visit.      Name: Melva Sauceda  MRN: [id number]  : 1965  Age: 56 y.o.  Date of assessment: 2022  PCP: LISANDRO Forde  Persons in attendance: Patient  Total session time: 54 minutes    This session took place on 22 at 3pm.  Note was signed off on 22 due to paperwork.    Topics addressed in psychotherapy include: Today's session covered the topic of relationships with her children.  Discussed her current cancer status as well. Therapist provided validation, support and feedback.  Therapist also provided a new tool for patient to utilize. The patient was encouraged to utilize the tool often at home and other settings.  There was no SI.        Objective Observations:   Participation:Active verbal participation   Grooming:Casual   Cognition:Alert and Fully Oriented   Eye Contact:Good   Mood:Euthymic   Affect:Congruent with content   Thought Process:Logical and Goal-directed   Speech:Rate within normal limits and Volume within normal limits    Current Risk:   Suicide: low   Homicide: low   Self-Harm: low   Relapse:    Safety Plan Needed:       Care Plan Updated: No    Does patient express agreement with the above plan? Yes     Diagnosis:  1. Adjustment disorder with mixed anxiety and depressed mood        Follow up appointment scheduled? Yes       Miladis Aguilar PsyD

## 2022-05-17 ENCOUNTER — TELEMEDICINE (OUTPATIENT)
Dept: BEHAVIORAL HEALTH | Facility: CLINIC | Age: 57
End: 2022-05-17
Payer: COMMERCIAL

## 2022-05-17 DIAGNOSIS — F43.23 ADJUSTMENT DISORDER WITH MIXED ANXIETY AND DEPRESSED MOOD: ICD-10-CM

## 2022-05-17 PROCEDURE — 90837 PSYTX W PT 60 MINUTES: CPT | Mod: 95 | Performed by: PSYCHOLOGIST

## 2022-06-02 ENCOUNTER — TELEMEDICINE (OUTPATIENT)
Dept: BEHAVIORAL HEALTH | Facility: CLINIC | Age: 57
End: 2022-06-02
Payer: COMMERCIAL

## 2022-06-02 DIAGNOSIS — F43.23 ADJUSTMENT DISORDER WITH MIXED ANXIETY AND DEPRESSED MOOD: ICD-10-CM

## 2022-06-02 PROCEDURE — 90837 PSYTX W PT 60 MINUTES: CPT | Mod: 95 | Performed by: PSYCHOLOGIST

## 2022-06-02 NOTE — PROGRESS NOTES
..  [facility] Behavioral [facility]   Psychotherapy Progress Note      This visit was conducted via Zoom using secure and encrypted videoconferencing technology.  The patient was in their private home in the [facility].  The patient's identity was confirmed and verbal consent was obtained for this virtual visit.      Name: Melva Sauceda  MRN: [id number]  : 1965  Age: 56 y.o.  Date of assessment: 2022  PCP: LISANDRO Forde  Persons in attendance: Patient  Total session time: 54 minutes    This session took place on 22 at 1pm. Due to the paperwork this note is being entered 22.    Topics addressed in psychotherapy include: Today's session covered the topic of her cancer treatment and relationships with her children. Therapist provided validation, support and feedback.  Therapist also provided a new tool for patient to utilize. The patient was encouraged to utilize the tool often at home and other settings.  There was no SI.        Objective Observations:   Participation:Active verbal participation   Grooming:Casual   Cognition:Alert and Fully Oriented   Eye Contact:Good   Mood:Euthymic   Affect:Congruent with content   Thought Process:Logical and Goal-directed   Speech:Rate within normal limits and Volume within normal limits    Current Risk:   Suicide: low   Homicide: low   Self-Harm: low   Relapse:    Safety Plan Needed:       Care Plan Updated: No    Does patient express agreement with the above plan? Yes     Diagnosis:  1. Adjustment disorder with mixed anxiety and depressed mood        Follow up appointment scheduled? Yes       Miladis Aguilar PsyD

## 2022-06-21 ENCOUNTER — APPOINTMENT (OUTPATIENT)
Dept: RADIOLOGY | Facility: MEDICAL CENTER | Age: 57
End: 2022-06-21
Attending: INTERNAL MEDICINE
Payer: COMMERCIAL

## 2022-06-21 DIAGNOSIS — C50.512 MALIGNANT NEOPLASM OF LOWER-OUTER QUADRANT OF LEFT FEMALE BREAST, UNSPECIFIED ESTROGEN RECEPTOR STATUS (HCC): ICD-10-CM

## 2022-06-21 PROCEDURE — C8908 MRI W/O FOL W/CONT, BREAST,: HCPCS

## 2022-06-21 PROCEDURE — A9576 INJ PROHANCE MULTIPACK: HCPCS | Performed by: INTERNAL MEDICINE

## 2022-06-21 PROCEDURE — 700117 HCHG RX CONTRAST REV CODE 255: Performed by: INTERNAL MEDICINE

## 2022-06-21 RX ADMIN — GADOTERIDOL 15 ML: 279.3 INJECTION, SOLUTION INTRAVENOUS at 18:58

## 2022-07-07 PROBLEM — C50.812 CARCINOMA OF OVERLAPPING SITES OF LEFT BREAST IN FEMALE, ESTROGEN RECEPTOR POSITIVE (HCC): Status: ACTIVE | Noted: 2022-07-07

## 2022-07-07 PROBLEM — Z17.0 CARCINOMA OF OVERLAPPING SITES OF LEFT BREAST IN FEMALE, ESTROGEN RECEPTOR POSITIVE (HCC): Status: ACTIVE | Noted: 2022-07-07

## 2022-07-08 ENCOUNTER — HOSPITAL ENCOUNTER (OUTPATIENT)
Dept: CARDIOLOGY | Facility: MEDICAL CENTER | Age: 57
End: 2022-07-08
Attending: INTERNAL MEDICINE
Payer: COMMERCIAL

## 2022-07-08 ENCOUNTER — HOSPITAL ENCOUNTER (OUTPATIENT)
Dept: RADIOLOGY | Facility: MEDICAL CENTER | Age: 57
End: 2022-07-08

## 2022-07-08 DIAGNOSIS — C50.512 MALIGNANT NEOPLASM OF LOWER-OUTER QUADRANT OF LEFT FEMALE BREAST, UNSPECIFIED ESTROGEN RECEPTOR STATUS (HCC): ICD-10-CM

## 2022-07-08 LAB
LV EJECT FRACT  99904: 65
LV EJECT FRACT MOD 2C 99903: 73.14
LV EJECT FRACT MOD 4C 99902: 65.33
LV EJECT FRACT MOD BP 99901: 68.99

## 2022-07-08 PROCEDURE — 93306 TTE W/DOPPLER COMPLETE: CPT | Mod: 26 | Performed by: INTERNAL MEDICINE

## 2022-07-08 PROCEDURE — 93306 TTE W/DOPPLER COMPLETE: CPT

## 2022-07-11 ENCOUNTER — HOSPITAL ENCOUNTER (OUTPATIENT)
Dept: RADIATION ONCOLOGY | Facility: MEDICAL CENTER | Age: 57
End: 2022-07-31
Attending: RADIOLOGY
Payer: COMMERCIAL

## 2022-07-11 ENCOUNTER — PRE-ADMISSION TESTING (OUTPATIENT)
Dept: ADMISSIONS | Facility: MEDICAL CENTER | Age: 57
End: 2022-07-11
Attending: SURGERY
Payer: COMMERCIAL

## 2022-07-11 VITALS
WEIGHT: 165 LBS | BODY MASS INDEX: 25.01 KG/M2 | HEIGHT: 68 IN | SYSTOLIC BLOOD PRESSURE: 150 MMHG | DIASTOLIC BLOOD PRESSURE: 81 MMHG

## 2022-07-11 DIAGNOSIS — C50.812 CARCINOMA OF OVERLAPPING SITES OF LEFT BREAST IN FEMALE, ESTROGEN RECEPTOR POSITIVE (HCC): ICD-10-CM

## 2022-07-11 DIAGNOSIS — C79.51 SECONDARY CARCINOMA OF BONE (HCC): ICD-10-CM

## 2022-07-11 DIAGNOSIS — Z17.0 CARCINOMA OF OVERLAPPING SITES OF LEFT BREAST IN FEMALE, ESTROGEN RECEPTOR POSITIVE (HCC): ICD-10-CM

## 2022-07-11 PROBLEM — M24.676: Status: ACTIVE | Noted: 2022-06-20

## 2022-07-11 PROCEDURE — 99205 OFFICE O/P NEW HI 60 MIN: CPT | Performed by: RADIOLOGY

## 2022-07-11 PROCEDURE — 99214 OFFICE O/P EST MOD 30 MIN: CPT | Performed by: RADIOLOGY

## 2022-07-11 ASSESSMENT — FIBROSIS 4 INDEX
FIB4 SCORE: 1.29
FIB4 SCORE: 1.29

## 2022-07-11 ASSESSMENT — PAIN SCALES - GENERAL: PAINLEVEL: NO PAIN

## 2022-07-11 NOTE — PROGRESS NOTES
"Patient was seen today in clinic with Dr. Arevalo for consultation.  Vitals signs and weight were obtained and pain assessment was completed.  Allergies and medications were reviewed with the patient.     Vitals/Pain:  Vitals:    07/11/22 0810   BP: (!) 150/81   BP Location: Right arm   Patient Position: Sitting   BP Cuff Size: Adult   Weight: 74.8 kg (165 lb)   Height: 1.727 m (5' 8\")   Pain Score: No pain        Allergies:   Patient has no known allergies.    Current Medications:  Current Outpatient Medications   Medication Sig Dispense Refill   • prochlorperazine (COMPAZINE) 5 MG Tab  (Patient not taking: Reported on 7/11/2022)     • dexamethasone (DECADRON) 4 MG Tab  (Patient not taking: Reported on 7/11/2022)     • hydrOXYzine HCl (ATARAX) 25 MG Tab TAKE 1 TAB BY MOUTH 3 TIMES DAILY AS NEEDED FOR SEVERE RASH/ITCHING (Patient not taking: Reported on 7/11/2022)     • ondansetron (ZOFRAN) 8 MG Tab  (Patient not taking: Reported on 7/11/2022)     • triamcinolone acetonide (KENALOG) 0.1 % Cream APPLY A THIN LAYER TO AFFECTED AREA TWICE A DAY AS NEEDED FOR REDNESS, RASH, ITCHING (Patient not taking: Reported on 7/11/2022)       No current facility-administered medications for this encounter.         PCP:  Girma Arora R.N.  "

## 2022-07-11 NOTE — CT SIMULATION
PATIENT NAME Melva Sauceda   PRIMARY PHYSICIAN RayoMelva [id number]   REFERRING PHYSICIAN Carlos Loza M.D. 1965     Carcinoma of overlapping sites of left breast in female, estrogen receptor positive (HCC)  Staging form: Breast, AJCC 8th Edition  - Clinical: Stage IV (cT2, cN1, cM1, G3, ER+, AL-, HER2+) - Signed by Raheel Arevalo M.D. on 7/7/2022  Histologic grading system: 3 grade system         Treatment Planning CT Simulation      Order Questions     Question Answer Comment    Is this for a new course of treatment? Yes     Is this an Addendum? No     Implanted Device/Pacemaker No     Simulation Status Initial     Treatment Site Breast     Laterality Left     Treatment Technique 3D CRT     Other Technique(s) 3D     Treatment Pattern/Frequency Daily     Concurrent Chemotherapy No     CT Technique 3D DIBH possible     DIBH     Slice Thickness 3mm     Scan Extent Chest     Bowel Preparation No     Treatment Device(s) Vac Holger      Wing Board     Treatment Marker Scar     Patient Attire Gown     Patient Position Supine     Patient Orientation Head First     Arm Position Up     Treatment Machine No preference     Treatment Image Guidance Ports     Frequency (ports) Weekly     Other Orders Weekly Physics Check due to chemo     Special Tx Procedure             Comments     Three field sim, a separate T7 SBRT sim will be done once she starts breast RT

## 2022-07-11 NOTE — CT SIMULATION
PATIENT NAME Melva Sauceda   PRIMARY PHYSICIAN Girma, Melva OVIDIO [id number]   REFERRING PHYSICIAN Carlos Loza M.D. 1965     Carcinoma of overlapping sites of left breast in female, estrogen receptor positive (HCC)  Staging form: Breast, AJCC 8th Edition  - Clinical: Stage IV (cT2, cN1, cM1, G3, ER+, ND-, HER2+) - Signed by Raheel Arevalo M.D. on 7/7/2022  Histologic grading system: 3 grade system    Secondary carcinoma of bone (HCC)  Staging form: Bone - Appendicular Skeleton, Trunk, Skull, and Facial Bones, AJCC 8th Edition  - Clinical: cM1 - Signed by Raheel Arevalo M.D. on 7/11/2022         Treatment Planning CT Simulation      Order Questions     Question Answer Comment    Is this for a new course of treatment? Yes     Is this an Addendum? No     Implanted Device/Pacemaker No     Simulation Status Initial     Treatment Site Breast     Laterality Left     Treatment Technique 3D CRT     Other Technique(s) 3D     Treatment Pattern/Frequency Daily     Concurrent Chemotherapy No     CT Technique 3D DIBH possible     DIBH     Slice Thickness 3mm     Scan Extent Chest     Bowel Preparation No     Treatment Device(s) Vac Holger      Wing Board     Treatment Marker Scar     Patient Attire Gown     Patient Position Supine     Patient Orientation Head First     Arm Position Up     Treatment Machine No preference     Treatment Image Guidance Ports     Frequency (ports) Weekly     Other Orders Weekly Physics Check due to chemo     Special Tx Procedure             Comments     Three field sim, a separate T7 SBRT sim will be done once she starts breast RT

## 2022-07-11 NOTE — CONSULTS
RADIATION ONCOLOGY CONSULT    DATE OF SERVICE: 7/11/2022    IDENTIFICATION:  Oligometastatic/ Stage IV (T2N1M1) invasive ductal carcinoma of the left outer lower quadrant of breast ER positive [place] negative HER2 positive with a Ki-67 of 26% with PET positive and MRI positive T7 vertebral metastasis receiving neoadjuvant TCH plus Perjeta planning on lumpectomy with lymph node dissection on 7/12/2022 planning on maintenance Herceptin, aromatase inhibitor, and Xgeva    HISTORY OF PRESENT ILLNESS: I had the pleasure of seeing Ms. Sauceda today in consultation at the request of Dr. Loza for her breast cancer.  Patient is a very healthy 56-year-old woman who felt a lump in her left breast.  Diagnostic mammogram showed 2 masses in the left breast.  Biopsy of both the primary and lymph node area showed an invasive ductal carcinoma with grade 3 features.  Tumor was ER positive WY negative HER2/yolande positive with a Ki-67 of 26%.  An associated lymph node by ultrasound was biopsied that was positive for malignancy.  MRI of the breast showed several areas of enhancement in the left breast as well as left-sided lymphadenopathy.  There was also a right-sided breast mass which was biopsied that was negative for malignancy.  Given the extent of disease PET scan was done that showed a T6 focal area of uptake which was deemed not amenable to biopsy given the location.  MRI of the spine was done that confirmed this is actually in the T7 location with an enhancement pattern concerning for malignancy.  She was therefore started on neoadjuvant TCH plus Perjeta.  She has had an excellent clinical and radiographic response.  Her current PET scan shows complete response.  She is planning on a lumpectomy and axillary dissection tomorrow.  She wanted to meet with me prior to surgery to understand the possibilities with in radiation and implication towards surgical choices.  She plans on receiving adjuvant Herceptin, an aromatase inhibitor, and  Xgeva.    PAST MEDICAL HISTORY:   Past Medical History:   Diagnosis Date   • Cancer (HCC) 2021    Breast cancer - left   • Cataract        PAST SURGICAL HISTORY:  Past Surgical History:   Procedure Laterality Date   • NV ULTRASONIC GUIDANCE, INTRAOPERATIVE Right 2021    Procedure: ULTRASOUND GUIDANCE;  Surgeon: Shane Jorge M.D.;  Location: SURGERY [facility];  Service: General   • CATH PLACEMENT Right 2021    Procedure: INSERTION, CATHETER - PORT A;  Surgeon: Shane Jorge M.D.;  Location: SURGERY [facility];  Service: General   • BUNIONECTOMY Right        GYNECOLOGICAL STATUS:  : 3, Para: 3 and Number of Interrupted Pregnancies: 0    HORMONE USE:  Post-menopause use 0 years    CURRENT MEDICATIONS:  Current Outpatient Medications   Medication Sig Dispense Refill   • prochlorperazine (COMPAZINE) 5 MG Tab  (Patient not taking: Reported on 2022)     • dexamethasone (DECADRON) 4 MG Tab  (Patient not taking: Reported on 2022)     • hydrOXYzine HCl (ATARAX) 25 MG Tab TAKE 1 TAB BY MOUTH 3 TIMES DAILY AS NEEDED FOR SEVERE RASH/ITCHING (Patient not taking: Reported on 2022)     • ondansetron (ZOFRAN) 8 MG Tab  (Patient not taking: Reported on 2022)     • triamcinolone acetonide (KENALOG) 0.1 % Cream APPLY A THIN LAYER TO AFFECTED AREA TWICE A DAY AS NEEDED FOR REDNESS, RASH, ITCHING (Patient not taking: Reported on 2022)       No current facility-administered medications for this encounter.       ALLERGIES:    Patient has no known allergies.    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  Lives with: Alone    REVIEW OF SYSTEMS:  A complete review of systems was completed in patient's chart on 2022.  All are negative with relationship to this diagnosis with the exception of:  Patient is healing well from surgery, does not have any suspicious lesions in the breast or axilla, denies any acute areas of bony tenderness or deformity, denies any new headaches or neurologic symptoms, she has been a part of the [employer] program remaining physically active and focused on lifestyle management during active therapy.  She states she has had minimal to no side effects from her chemotherapy.    PHYSICAL EXAM:    Vitals:    22 0810   BP: (!) 150/81   BP Location: Right arm   Patient Position: Sitting   BP Cuff Size: Adult   Weight: 74.8 kg (165 lb)   Height: 1.727 m (5' 8\")   Pain Score: No pain    ECO= Fully active, able to carry on all pre-disease performance without restriction.    GENERAL: No apparent distress.  HEENT:  Pupils are equal, round, and reactive to light.  Extraocular muscles   are intact. Sclerae nonicteric.  Conjunctivae pink.  Oral cavity, tongue   protrudes midline.   NECK:  Supple without evidence of thyromegaly.  NODES:  No peripheral adenopathy of the neck, supraclavicular fossa or axillae   bilaterally.  LUNGS:  Clear to ascultation bilaterally   HEART:  Regular rate and rhythm.  No murmur appreciated  BREAST: No suspicious lesions found in either breast or axilla.  ABDOMEN:  Soft. No evidence of hepatosplenomegaly.  Positive bowel sounds.  EXTREMITIES:  Without Edema.  NEUROLOGIC:  Cranial nerves II through XII were intact. Normal stance and gait motor and sensory grossly within normal limits    IMPRESSION:    Oligometastatic/ Stage IV (T2N1M1) invasive ductal carcinoma of the left outer lower quadrant of breast ER positive [place] negative HER2 positive with a Ki-67 of 26% with PET positive and MRI positive T7 vertebral metastasis " receiving neoadjuvant TCH plus Perjeta planning on lumpectomy with lymph node dissection on 7/12/2022 planning on maintenance Herceptin, aromatase inhibitor, and Xgeva    RECOMMENDATIONS:   I discussed the diagnosis, prognosis, and treatment options over a 1 hr 5 min time period, 95% of that time dedicated to ongoing treatment management.  I reviewed with the patient the variables important towards her staging.  We discussed the spectrum of entirely localized disease to regional disease to micrometastatic disease to oligometastatic disease to calvin metastasis.  We went over the implication for deciding on importance of systemic therapy versus local management.  In addition we discussed the significance of various  mutations most notably HER2 positive and ER positive in her case.  We first highlighted the importance of systemic therapy towards her overall outcomes.  The first clinical question we explored is the role of radiation of any kind if complete pathologic response is achieved.  The current clinical trial B 51 is exploring this question and we do not have any results thus far.  Until then standard of care is followed.  Next we discussed the role of radiation and lymph node positive breast cancer either after mastectomy or lumpectomy.  If we are to pursue radiation radiation would be recommended regardless of her surgical choice therefore her desire to stay very physically active makes breast conservation if possible a more ideal circumstance.  If she were lymph node negative the choice of a mastectomy would have resulted in the omission of radiation.  Finally we discussed the evolving question of radiosurgery in the oligometastatic setting.  We went over the historical context for radiation and sites of identifiable disease.  We discussed the original outcomes data published by [facility].  Followed by the next 5 randomized trials ending at outcomes benefit by incorporation of radiosurgery.  However in  [place] the  trial points out that radiosurgery is a standard for all oligometastatic breast cancer patients is not the standard.  Therefore we need to review whether there are selective cases where radiosurgery still may be of value.  After discussing the pros and cons of any radiation or comprehensive radiation to all sites of known disease we agreed to proceeding with radiotherapy in her particular situation.  Therefore she was given a simulation for 5 weeks from surgery for her breast and lymphatic treatment.  During her breast treatment we will incorporate a simulation for her T7 vertebral body.      We discussed the risks, benefits and side effects of treatment and the patient is amenable to treatment.  If patient has any questions or concerns, she should feel free to contact me.    Thank you for the opportunity to participate in her care.  If any questions or comments, please do not hesitate in calling.

## 2022-07-12 ENCOUNTER — APPOINTMENT (OUTPATIENT)
Dept: RADIOLOGY | Facility: MEDICAL CENTER | Age: 57
End: 2022-07-12
Attending: SURGERY
Payer: COMMERCIAL

## 2022-07-12 ENCOUNTER — HOSPITAL ENCOUNTER (OUTPATIENT)
Facility: MEDICAL CENTER | Age: 57
End: 2022-07-12
Attending: SURGERY | Admitting: SURGERY
Payer: COMMERCIAL

## 2022-07-12 ENCOUNTER — ANESTHESIA (OUTPATIENT)
Dept: SURGERY | Facility: MEDICAL CENTER | Age: 57
End: 2022-07-12
Payer: COMMERCIAL

## 2022-07-12 ENCOUNTER — ANESTHESIA EVENT (OUTPATIENT)
Dept: SURGERY | Facility: MEDICAL CENTER | Age: 57
End: 2022-07-12
Payer: COMMERCIAL

## 2022-07-12 VITALS
RESPIRATION RATE: 16 BRPM | HEIGHT: 68 IN | HEART RATE: 57 BPM | DIASTOLIC BLOOD PRESSURE: 83 MMHG | TEMPERATURE: 97.1 F | OXYGEN SATURATION: 96 % | WEIGHT: 163.8 LBS | BODY MASS INDEX: 24.83 KG/M2 | SYSTOLIC BLOOD PRESSURE: 133 MMHG

## 2022-07-12 DIAGNOSIS — C50.912 MALIGNANT NEOPLASM OF LEFT FEMALE BREAST, UNSPECIFIED ESTROGEN RECEPTOR STATUS, UNSPECIFIED SITE OF BREAST (HCC): ICD-10-CM

## 2022-07-12 PROCEDURE — 88342 IMHCHEM/IMCYTCHM 1ST ANTB: CPT | Mod: 91

## 2022-07-12 PROCEDURE — 01610 ANES ALL PX NRV MUSC SHO&AX: CPT | Performed by: ANESTHESIOLOGY

## 2022-07-12 PROCEDURE — 302083 SET,INFUSION NEEDLE 20 X 3/4": Performed by: SURGERY

## 2022-07-12 PROCEDURE — 700111 HCHG RX REV CODE 636 W/ 250 OVERRIDE (IP): Performed by: ANESTHESIOLOGY

## 2022-07-12 PROCEDURE — A4648 IMPLANTABLE TISSUE MARKER: HCPCS | Performed by: SURGERY

## 2022-07-12 PROCEDURE — 160036 HCHG PACU - EA ADDL 30 MINS PHASE I: Performed by: SURGERY

## 2022-07-12 PROCEDURE — 160002 HCHG RECOVERY MINUTES (STAT): Performed by: SURGERY

## 2022-07-12 PROCEDURE — 76098 X-RAY EXAM SURGICAL SPECIMEN: CPT | Mod: LT

## 2022-07-12 PROCEDURE — 160029 HCHG SURGERY MINUTES - 1ST 30 MINS LEVEL 4: Performed by: SURGERY

## 2022-07-12 PROCEDURE — 700101 HCHG RX REV CODE 250: Performed by: SURGERY

## 2022-07-12 PROCEDURE — 88307 TISSUE EXAM BY PATHOLOGIST: CPT

## 2022-07-12 PROCEDURE — 160025 RECOVERY II MINUTES (STATS): Performed by: SURGERY

## 2022-07-12 PROCEDURE — 700111 HCHG RX REV CODE 636 W/ 250 OVERRIDE (IP): Performed by: SURGERY

## 2022-07-12 PROCEDURE — 160041 HCHG SURGERY MINUTES - EA ADDL 1 MIN LEVEL 4: Performed by: SURGERY

## 2022-07-12 PROCEDURE — 160009 HCHG ANES TIME/MIN: Performed by: SURGERY

## 2022-07-12 PROCEDURE — 38792 RA TRACER ID OF SENTINL NODE: CPT | Mod: LT

## 2022-07-12 PROCEDURE — 88331 PATH CONSLTJ SURG 1 BLK 1SPC: CPT

## 2022-07-12 PROCEDURE — 160048 HCHG OR STATISTICAL LEVEL 1-5: Performed by: SURGERY

## 2022-07-12 PROCEDURE — 160035 HCHG PACU - 1ST 60 MINS PHASE I: Performed by: SURGERY

## 2022-07-12 PROCEDURE — 160046 HCHG PACU - 1ST 60 MINS PHASE II: Performed by: SURGERY

## 2022-07-12 DEVICE — IMPLANTABLE DEVICE: Type: IMPLANTABLE DEVICE | Site: CHEST | Status: FUNCTIONAL

## 2022-07-12 RX ORDER — HEPARIN SODIUM (PORCINE) LOCK FLUSH IV SOLN 100 UNIT/ML 100 UNIT/ML
300-500 SOLUTION INTRAVENOUS PRN
Status: DISCONTINUED | OUTPATIENT
Start: 2022-07-12 | End: 2022-07-12 | Stop reason: HOSPADM

## 2022-07-12 RX ORDER — DIPHENHYDRAMINE HYDROCHLORIDE 50 MG/ML
12.5 INJECTION INTRAMUSCULAR; INTRAVENOUS
Status: DISCONTINUED | OUTPATIENT
Start: 2022-07-12 | End: 2022-07-12 | Stop reason: HOSPADM

## 2022-07-12 RX ORDER — ONDANSETRON 2 MG/ML
INJECTION INTRAMUSCULAR; INTRAVENOUS PRN
Status: DISCONTINUED | OUTPATIENT
Start: 2022-07-12 | End: 2022-07-12 | Stop reason: SURG

## 2022-07-12 RX ORDER — ALPRAZOLAM 0.25 MG/1
0.5 TABLET ORAL
Status: DISCONTINUED | OUTPATIENT
Start: 2022-07-12 | End: 2022-07-12 | Stop reason: HOSPADM

## 2022-07-12 RX ORDER — SODIUM CHLORIDE, SODIUM LACTATE, POTASSIUM CHLORIDE, CALCIUM CHLORIDE 600; 310; 30; 20 MG/100ML; MG/100ML; MG/100ML; MG/100ML
INJECTION, SOLUTION INTRAVENOUS CONTINUOUS
Status: ACTIVE | OUTPATIENT
Start: 2022-07-12 | End: 2022-07-12

## 2022-07-12 RX ORDER — HALOPERIDOL 5 MG/ML
1 INJECTION INTRAMUSCULAR
Status: DISCONTINUED | OUTPATIENT
Start: 2022-07-12 | End: 2022-07-12 | Stop reason: HOSPADM

## 2022-07-12 RX ORDER — BUPIVACAINE HYDROCHLORIDE AND EPINEPHRINE 5; 5 MG/ML; UG/ML
INJECTION, SOLUTION EPIDURAL; INTRACAUDAL; PERINEURAL
Status: DISCONTINUED | OUTPATIENT
Start: 2022-07-12 | End: 2022-07-12 | Stop reason: HOSPADM

## 2022-07-12 RX ORDER — OXYCODONE HCL 5 MG/5 ML
10 SOLUTION, ORAL ORAL
Status: DISCONTINUED | OUTPATIENT
Start: 2022-07-12 | End: 2022-07-12 | Stop reason: HOSPADM

## 2022-07-12 RX ORDER — ONDANSETRON 2 MG/ML
4 INJECTION INTRAMUSCULAR; INTRAVENOUS
Status: DISCONTINUED | OUTPATIENT
Start: 2022-07-12 | End: 2022-07-12 | Stop reason: HOSPADM

## 2022-07-12 RX ORDER — LIDOCAINE AND PRILOCAINE 25; 25 MG/G; MG/G
CREAM TOPICAL
Status: COMPLETED | OUTPATIENT
Start: 2022-07-12 | End: 2022-07-12

## 2022-07-12 RX ORDER — SODIUM CHLORIDE, SODIUM LACTATE, POTASSIUM CHLORIDE, CALCIUM CHLORIDE 600; 310; 30; 20 MG/100ML; MG/100ML; MG/100ML; MG/100ML
INJECTION, SOLUTION INTRAVENOUS CONTINUOUS
Status: DISCONTINUED | OUTPATIENT
Start: 2022-07-12 | End: 2022-07-12 | Stop reason: HOSPADM

## 2022-07-12 RX ORDER — HYDROMORPHONE HYDROCHLORIDE 1 MG/ML
0.1 INJECTION, SOLUTION INTRAMUSCULAR; INTRAVENOUS; SUBCUTANEOUS
Status: DISCONTINUED | OUTPATIENT
Start: 2022-07-12 | End: 2022-07-12 | Stop reason: HOSPADM

## 2022-07-12 RX ORDER — DEXAMETHASONE SODIUM PHOSPHATE 4 MG/ML
INJECTION, SOLUTION INTRA-ARTICULAR; INTRALESIONAL; INTRAMUSCULAR; INTRAVENOUS; SOFT TISSUE PRN
Status: DISCONTINUED | OUTPATIENT
Start: 2022-07-12 | End: 2022-07-12 | Stop reason: SURG

## 2022-07-12 RX ORDER — OXYCODONE HCL 5 MG/5 ML
5 SOLUTION, ORAL ORAL
Status: DISCONTINUED | OUTPATIENT
Start: 2022-07-12 | End: 2022-07-12 | Stop reason: HOSPADM

## 2022-07-12 RX ORDER — HYDROMORPHONE HYDROCHLORIDE 1 MG/ML
0.4 INJECTION, SOLUTION INTRAMUSCULAR; INTRAVENOUS; SUBCUTANEOUS
Status: DISCONTINUED | OUTPATIENT
Start: 2022-07-12 | End: 2022-07-12 | Stop reason: HOSPADM

## 2022-07-12 RX ORDER — CEFAZOLIN SODIUM 1 G/3ML
INJECTION, POWDER, FOR SOLUTION INTRAMUSCULAR; INTRAVENOUS PRN
Status: DISCONTINUED | OUTPATIENT
Start: 2022-07-12 | End: 2022-07-12 | Stop reason: SURG

## 2022-07-12 RX ORDER — HYDROMORPHONE HYDROCHLORIDE 1 MG/ML
0.2 INJECTION, SOLUTION INTRAMUSCULAR; INTRAVENOUS; SUBCUTANEOUS
Status: DISCONTINUED | OUTPATIENT
Start: 2022-07-12 | End: 2022-07-12 | Stop reason: HOSPADM

## 2022-07-12 RX ADMIN — HEPARIN 300 UNITS: 100 SYRINGE at 18:19

## 2022-07-12 RX ADMIN — FENTANYL CITRATE 100 MCG: 50 INJECTION, SOLUTION INTRAMUSCULAR; INTRAVENOUS at 15:36

## 2022-07-12 RX ADMIN — FENTANYL CITRATE 100 MCG: 50 INJECTION, SOLUTION INTRAMUSCULAR; INTRAVENOUS at 14:34

## 2022-07-12 RX ADMIN — CEFAZOLIN 2 G: 330 INJECTION, POWDER, FOR SOLUTION INTRAMUSCULAR; INTRAVENOUS at 14:42

## 2022-07-12 RX ADMIN — LIDOCAINE AND PRILOCAINE 1 APPLICATION: 25; 25 CREAM TOPICAL at 09:33

## 2022-07-12 RX ADMIN — ONDANSETRON 4 MG: 2 INJECTION INTRAMUSCULAR; INTRAVENOUS at 14:43

## 2022-07-12 RX ADMIN — DEXAMETHASONE SODIUM PHOSPHATE 8 MG: 4 INJECTION, SOLUTION INTRA-ARTICULAR; INTRALESIONAL; INTRAMUSCULAR; INTRAVENOUS; SOFT TISSUE at 14:43

## 2022-07-12 RX ADMIN — PROPOFOL 150 MCG/KG/MIN: 10 INJECTION, EMULSION INTRAVENOUS at 14:32

## 2022-07-12 ASSESSMENT — PAIN SCALES - GENERAL: PAIN_LEVEL: 1

## 2022-07-12 ASSESSMENT — PAIN DESCRIPTION - PAIN TYPE
TYPE: SURGICAL PAIN

## 2022-07-12 ASSESSMENT — FIBROSIS 4 INDEX: FIB4 SCORE: 1.29

## 2022-07-12 NOTE — ANESTHESIA PROCEDURE NOTES
Airway    Date/Time: 7/12/2022 2:36 PM  Performed by: Huang Long M.D.  Authorized by: Huang Long M.D.     Location:  OR  Urgency:  Elective  Difficult Airway: No    Indications for Airway Management:  Anesthesia      Spontaneous Ventilation: present    Sedation Level:  Deep  Preoxygenated: Yes    Patient Position:  Sniffing  MILS Maintained Throughout: Yes    Mask Difficulty Assessment:  0 - not attempted  Final Airway Type:  Supraglottic airway  Final Supraglottic Airway:  Standard LMA    SGA Size:  4  Number of Attempts at Approach:  1

## 2022-07-12 NOTE — OP REPORT
Preoperative diagnosis; stage IV carcinoma of the left breast  Postoperative diagnosis; same  Operation; left partial mastectomy, intraoperative identification of sentinel lymph node; multiple specimen radiography's; level 1 axillary lymph node dissection  Local tissue rearrangement reconstruction with placement of breast implant/radiation fiducial; BioSorb 3 x 4 cm  Surgeon' MISTI FUENTES  Anesthesia; Dr. Long general anesthesia  Complications; none  Estimated blood loss; 250 cc  Specimens 6  Wound classification; clean  Operative note; this 56-year-old woman presents with multifocal carcinoma of the left breast.  She had interval detection of 2 suspicious lesions in the upper outer quadrant of her left breast along the 2:00 axis.  There were separate foci in the same segment around 4 and 8 cm from the nipple.  These were biopsied and both positive for infiltrating ductal carcinoma.  Molecular subtyping showed weak positivity for estrogen negative progesterone variable Ki-67 and she was HER2/yolande 3+ positive.  In addition she had suspicious axillary lymph node which was also biopsied and positive.  She was felt to be a candidate for neoadjuvant chemotherapy she had port placement received neoadjuvant therapy and appeared to have a positive clinical response.  She was felt to be a candidate then for partial mastectomy and possible sentinel lymph node biopsy versus axillary dissection.  Maryjane scouts were placed in all biopsied lesion in order to guide surgery.  Conservative therapy was discussed with Dr. Han Goldberg from radiation therapy and both he and treating medical oncology felt this was a reasonable surgical plan for this patient during her work-up prior to chemotherapy she was found to have a thoracic spine bone met which likely will be treated with further radiation therapy.  She has not demonstrated any disease progression during neoadjuvant therapy patient had the risk possible complications of operation explained  to her meticulous detail she consented to proceed.  She received prophylactic antibiotics had sequential stockings applied as an times and prophylaxis she underwent preoperative injection with technetium sulfur colloid in order to identify sentinel lymph node.  Patient appeared of had migration into the axilla.  She was taken to the operating room placed under anesthesia her breast and axilla were prepped with ChloraPrep solution and sterile drapes were applied and a timeout was affected the 2 breast lesions were identified and an elliptical incision was made in order to accomplish this the 2 MARYJANE  localized foci in the 2:00 axis and a radial configured incision.  Infiltration of Marcaine was undertaken ellipse was made in the skin a full-thickness excision then partial mastectomy was made encompassing both lesions essentially resecting the upper outer quadrant of the breast.  The marker for the Maryjane  was encountered laterally at the edge of the resection.  This was taken full-thickness down to the chest wall.  The wound was made hemostatic using electrocautery the specimen was oriented for the pathologist with short suture superiorly long sutures laterally and the deep margin was colored purple with a marking pen.  Images of this done on the Trident demonstrated retention of both Maryjane scouts but obviously the lateral marker was somewhat eccentric.  Additional lateral margin was taken and oriented similarly for the pathologist.  There was a rough area palpable along the superior medial margin and this was also resected as a superior medial margin and marked with a short superior long lateral sutures.  This wound was ultimately reconstructed with local breast flap creation.  The subcutaneous tissues were  over the breast which was fully mobilized from the pectoralis major and then mobilized circumferentially and advanced into the previous area in order to avoid defect deformity.  This was done  circumferentially but primarily superiorly and lateral and inferior medial.  Hemostasis was secured with electrocautery.  The wound was irrigated.  A 4 x 3 cm BioSorb implant was placed for volume replacement as well as a radiation fiducial to help guide radiation therapy.  This fiducial incorporated areas between the 2 MARYJANE  markers with some overlap on the marker themselves to help guide the radiation oncologist.  This was sutured in place using interrupted 3-0 Vicryl suture in multiple planes.  The breast flaps were then advanced up to and around it with interrupted 3-0 Vicryl suture.  10 cc of Vistaseal was placed in the wound in order to help with hemostasis the subcutaneous tissues and skin were then closed over the device with interrupted 3-0 Vicryl suture and a running 4-0 Monocryl subcuticular in the skin the wound was later sealed with Dermabond.  Patient had an incision made below the hairbearing area of the axilla.  This was carried into the axillary contents through the axillary fascia.  The axillary contents were interrogated with the savvy  tool as well as the navigator and the likely location of sentinel lymph nodes was undertaken as well as the retained Maryjane  from the previous axillary biopsy in the patient had this tissue elevated and excised using combination of electrocautery and sharp dissection.  Approximately a 4 cm group of tissue was actually resected.  There appeared to be potentially necrotic appearing lymph nodes that proved to be the sentinel lymph nodes these were  and 1 in 2 and given to the pathologist for examination the return indicating there was some atypical looking cells possibly as a result of neoadjuvant chemotherapy but no definite malignancy.  A third specimen contained the Maryjane  but it was juxtaposed to the sentinel lymph node #1 this tissue was submitted as axillary contents and a radiograph confirmed that the Maryjane  was not removed.  This  wound was also made hemostatic using electrocautery further Pottle with Marcaine treated with 4 cc of Vistaseal then closed in layers using interrupted 3-0 Vicryl suture and then the skin was closed using running 4-0 Monocryl in an effort to prevent seroma formation or lymph Angio-Seal.  This wound was similarly sealed with Dermabond at the end of the procedure.  Patient seemed to have a more higher than average bleeding but was good well hemostatic at the completion of operation I feel she can be treated on an ambulatory basis.  She was awakened extubated taken recovery room in stable satisfactory condition.  Final pathology will determine the need for additional surgical therapy.  Patient should have follow-up with me scheduled.  Should her recovery trajectory deviate from that is anticipated as expected patient should call promptly.

## 2022-07-12 NOTE — ANESTHESIA TIME REPORT
Anesthesia Start and Stop Event Times     Date Time Event    7/12/2022 1414 Ready for Procedure     1432 Anesthesia Start        Responsible Staff  07/12/22    Name Role Begin End    Huang Long M.D. Anesth 1432         Overtime Reason:  no overtime (within assigned shift)    Comments:

## 2022-07-12 NOTE — OR NURSING
Pt arrived via gurney from OR w/ RN and anesthesia. VSS. Report received. Orders reviewed and initiated. Incision c/d/i.

## 2022-07-12 NOTE — ANESTHESIA POSTPROCEDURE EVALUATION
Patient: Melva Sauceda    Procedure Summary     Date: 07/12/22 Room / Location: [place] [id number] / SURGERY [facility]    Anesthesia Start: 1432 Anesthesia Stop:     Procedures:       LEFT ELIAN  LOCALIZED PARTIAL MASTECTOMY, LEFT SENTINEL NODE BIOPSY, POSSIBLE AXILLARY DISSECTION, BIOABSORBABLE FIDUCIAL MARKER PLACEMENT (Left Chest)      BIOPSY, LYMPH NODE, SENTINEL (Left Chest)      LYMPHADENECTOMY,  AXILLARY (Left Chest) Diagnosis: (PRIMARY MALIGNANT NEOPLASM OF FEMALE BREAST)    Surgeons: Shane Jorge M.D. Responsible Provider: Huang Long M.D.    Anesthesia Type: general ASA Status: 2          Final Anesthesia Type: general  Last vitals  BP   Blood Pressure: (!) 141/63    Temp   36.7 °C (98 °F)    Pulse   (!) 58   Resp   16    SpO2   96 %      Anesthesia Post Evaluation    Patient location during evaluation: PACU  Patient participation: complete - patient participated  Level of consciousness: awake and alert  Pain score: 1    Airway patency: patent  Anesthetic complications: no  Cardiovascular status: adequate  Respiratory status: acceptable  Hydration status: acceptable    PONV: none          No complications documented.     Nurse Pain Score: 0 (NPRS)

## 2022-07-12 NOTE — DISCHARGE INSTRUCTIONS
If any questions arise, call your provider.  If your provider is not available, please feel free to call the Surgical Center at [phone].    MEDICATIONS: Resume taking daily medication.  Take prescribed pain medication with food.  If no medication is prescribed, you may take non-aspirin pain medication if needed.  PAIN MEDICATION CAN BE VERY CONSTIPATING.  Take a stool softener or laxative such as senokot, pericolace, or milk of magnesia if needed.    Last pain medication given at     What to Expect Post Anesthesia    Rest and take it easy for the first 24 hours.  A responsible adult is recommended to remain with you during that time.  It is normal to feel sleepy.  We encourage you to not do anything that requires balance, judgment or coordination.    FOR 24 HOURS DO NOT:  Drive, operate machinery or run household appliances.  Drink beer or alcoholic beverages.  Make important decisions or sign legal documents.    To avoid nausea, slowly advance diet as tolerated, avoiding spicy or greasy foods for the first day.  Add more substantial food to your diet according to your provider's instructions.  Babies can be fed formula or breast milk as soon as they are hungry.  INCREASE FLUIDS AND FIBER TO AVOID CONSTIPATION.    MILD FLU-LIKE SYMPTOMS ARE NORMAL.  YOU MAY EXPERIENCE GENERALIZED MUSCLE ACHES, THROAT IRRITATION, HEADACHE AND/OR SOME NAUSEA.    Diet    Resume pre-operative diet upon discharge from the hospital. Depending on how you are feeling and whether you have nausea or not, you may wish to stay with a bland diet for the first few days. However, you can advance your diet as quickly as you feel ready.    Diet as tolerated     Ok to shower     Incision will feel hard on breast

## 2022-07-12 NOTE — ANESTHESIA PREPROCEDURE EVALUATION
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          Relevant Problems   NEURO   (positive) History of retinal detachment       Physical Exam    Airway   Mallampati: I  TM distance: >3 FB  Neck ROM: full       Cardiovascular   Rhythm: regular  Rate: normal     Dental - normal exam           Pulmonary   Breath sounds clear to auscultation     Abdominal    Neurological - normal exam                 Anesthesia Plan    ASA 2       Plan - general       Airway plan will be LMA        Plan Factors:   Patient was not previously instructed to abstain from smoking on day of procedure.  Patient did not smoke on day of procedure.      Induction: intravenous          Informed Consent:    Anesthetic plan and risks discussed with patient.    Use of blood products discussed with: patient whom.

## 2022-07-13 LAB — PATHOLOGY CONSULT NOTE: NORMAL

## 2022-07-27 ENCOUNTER — PATIENT OUTREACH (OUTPATIENT)
Dept: OTHER | Facility: MEDICAL CENTER | Age: 57
End: 2022-07-27
Payer: COMMERCIAL

## 2022-07-27 ENCOUNTER — PHYSICAL THERAPY (OUTPATIENT)
Dept: PHYSICAL THERAPY | Facility: REHABILITATION | Age: 57
End: 2022-07-27
Attending: SURGERY
Payer: COMMERCIAL

## 2022-07-27 DIAGNOSIS — I89.0 LYMPHEDEMA: ICD-10-CM

## 2022-07-27 PROCEDURE — 97161 PT EVAL LOW COMPLEX 20 MIN: CPT

## 2022-07-27 PROCEDURE — 97535 SELF CARE MNGMENT TRAINING: CPT

## 2022-07-27 NOTE — PROGRESS NOTES
"Phoned pt for follow up.  Pt states things are \"going well\".  She has been referred to lymphedema therapy.  She is also due to see her radiation oncologist on 8/15/22.  Pt denies any questions or concerns at this time.  Will be available.          "

## 2022-07-27 NOTE — OP THERAPY EVALUATION
"  Outpatient Physical Therapy  LYMPHEDEMA THERAPY INITIAL EVALUATION    [facility] [place] [address]  [address]  [place] [address]  Phone:  [phone]  Fax:  [phone]    Date of Evaluation: 07/27/2022    Patient: Melva Sauceda  YOB: 1965  MRN: [id number]     Referring Provider: Shane Jorge M.D.  [address] [place] [address]   Referring Diagnosis Malignant neoplasm of nipple and areola, left female breast [C50.012]     Time Calculation    Start time: 1631  Stop time: 1717 Time Calculation (min): 46 minutes             Chief Complaint: Lymphedema Breast Cancer    Visit Diagnoses     ICD-10-CM   1. Lymphedema  I89.0       Subjective:   History of Present Illness:     Mechanism of injury:  Per chart: 7/12 \"left partial mastectomy, intraoperative identification of sentinel lymph node; multiple specimen radiography's; level 1 axillary lymph node dissection  Local tissue rearrangement reconstruction with placement of breast implant/radiation fiducial\"    Has had trouble with drainage to L breast; drain placed yesterday. Has a sleeve.       Past Medical History:   Diagnosis Date   • Cancer (HCC) 12/2021    Breast cancer - left   • Cataract     H/O OU   • COVID-19 06/11/2022   • Detached retina, right     H/O   • PONV (postoperative nausea and vomiting)    • Prediabetes      Past Surgical History:   Procedure Laterality Date   • PB MASTECTOMY, PARTIAL Left 7/12/2022    Procedure: LEFT ELIAN  LOCALIZED PARTIAL MASTECTOMY, LEFT SENTINEL NODE BIOPSY, POSSIBLE AXILLARY DISSECTION, BIOABSORBABLE FIDUCIAL MARKER PLACEMENT;  Surgeon: Shane Jorge M.D.;  Location: SURGERY [facility];  Service: General   • NODE BIOPSY SENTINEL Left 7/12/2022    Procedure: BIOPSY, LYMPH NODE, SENTINEL;  Surgeon: Shane Jorge M.D.;  Location: SURGERY [facility];  Service: General   • AXILLARY NODE DISSECTION Left 7/12/2022    Procedure: LYMPHADENECTOMY,  AXILLARY;  " Surgeon: Shane Jorge M.D.;  Location: SURGERY [facility];  Service: General   • NC ULTRASONIC GUIDANCE, INTRAOPERATIVE Right 2021    Procedure: ULTRASOUND GUIDANCE;  Surgeon: Shane Jorge M.D.;  Location: SURGERY [facility];  Service: General   • CATH PLACEMENT Right 2021    Procedure: INSERTION, CATHETER - PORT A;  Surgeon: Shaen Jorge M.D.;  Location: SURGERY [facility];  Service: General   • RETINAL DETACHMENT REPAIR Right    • BUNIONECTOMY Right    • CATARACT EXTRACTION WITH IOL Left    • GYN SURGERY          • LUMPECTOMY Bilateral     >20 years   • OTHER      Cataract Extraction OD     Social History     Tobacco Use   • Smoking status: Never Smoker   • Smokeless tobacco: Never Used   Substance Use Topics   • Alcohol use: Yes     Comment: 1 drink per day     Family and Occupational History     Socioeconomic History   • Marital status: Single     Spouse name: Not on file   • Number of children: Not on file   • Years of education: Not on file   • Highest education level: Not on file   Occupational History   • Not on file       Lymphedema Objective      Left Upper Extremity Circumferential Measurements  Other: cm  Areola: cm  Breast Crease: cm  Axilla: 32.3 cm  Upper Proximal Humerus: 31.2 cm  Distal Humerus: 27.9 cm  Elbow: 25.4 cm  Mid-Forearm: 24.6 cm  Wrist: 15.7 cm  Distal Bucio Crease: 18.9 cm  Total: 176 cm    Right Upper Extremity Circumferential Measurements  Other: cm  Areola: cm  Breast Crease: cm  Axilla: 33.1 cm  Upper Proximal Humerus: 32.3 cm  Distal Humerus: 29.5 cm  Elbow: 26 cm  Mid-Forearm: 26 cm  Wrist: 15.9 cm  Distal Bucio Crease: 19.2 cm  Total: cm 182            Therapeutic Treatments and Modalities:     1. Self Care ADL Training (CPT 33218)    Therapeutic Treatment and Modalities Summary: Educated patient on pathogenesis of lymphedema. Distributed brochure. Patient has also been educated on skin care precautions including hygiene, lotions with pH  "5-5.5, and avoidance of lacerations/mosquito bites/heat. Also educated pt on signs/symptoms of cellulitis.     Patient was educated in regular, self MLD of left upper extremity utilizing axillary and inguinal lymph nodes on right and left respectively with associated pathways.  Correct strokes were emphasized and handout was provided.  Patient was invited to return for in clinic, skilled physical therapist administered MLD for which patient will set an additional appointment.       Following discussion re: the need for external compression and education in compression garment options.  Patient was educated in its optimal use (all day, every day, especially during higher risk activities as discussed, remove at night).  Lymphedema risk factors were discussed with a lymphedema \"Do's and Don'ts\" handout provided.  Patient verbalized understanding to all education today.    Time-based treatments/modalities:    Physical Therapy Timed Treatment Charges  Functional training, self care minutes (CPT 85841): 18 minutes      Assessment and Plan:   Assessment details:  Pt is a 55yo F who underwent surgery for breast cancer tumor removal and lymphadenectomy. She arrives today without gross difference in UE circumferential measurements and with only slight restriction to GHJ ROM (likely related to recent surgery and tape). Pt has been educated on lymphedema prevention as she is considered to be in latency. Pt already has a well-fitting sleeve and understands when to wear. Educated her on axillary web syndrome and what to look for. Reviewed self-MLD with return demo; handout provided. Pt was invited back to clinic once she has completed her radiation to ensure she has not lost ROM and has not noted swelling.     Goals:   Short Term Goals:  1. Pt will initiate a stretching and ROM HEP to improve GHJ ROM.   2. Pt will consistently perform self-MLD to reduce fluid stagnation to L arm.   Short term goal time span:  2-4 weeks    Long " Term Goals:  1. Pt will consistently wear her sleeve during athletic endeavors, air travel, and increased temperature situations to improve fluid removal from arm.   Long term goal time span:  4-6 weeks  Planned therapy interventions:  Home exercise program, compression sleeve, intermittent compression, manual lymph drainage, myofascial release techniques, orthotic measurements/fitting, patient education, postural exercises, range of motion exercises, referral for compression garment & instructions for don/doffing, self-care/training (CPT 40369), short stretch bandages, skin/wound care, soft tissue manual techniques (CPT 27204), strengthening exercises and Velcro wraps  Planned education:  Functional anatomy and physiology of the lymphatic system, pathophysiology of lymphedema, lymphedema exercise, lymphedema precautions, proper skin care/nutrition, compression bandaging, self massage, infection prevention, scar tissue management, activity guidelines, dietary guidelines, skin care guidelines, home pump use, bandage removal and long term self-management of lymphedema  Frequency:  1x month  Duration in weeks:  8  Discussed with:  Patient      Functional Assessment Used    LLIS    Referring provider co-signature:  I have reviewed this plan of care and my co-signature certifies the need for services.    Certification Period: 07/27/2022 to 10/30/2022    Physician Signature: ________________________________ Date: ______________

## 2022-08-01 ENCOUNTER — TELEMEDICINE (OUTPATIENT)
Dept: BEHAVIORAL HEALTH | Facility: CLINIC | Age: 57
End: 2022-08-01
Payer: COMMERCIAL

## 2022-08-01 ENCOUNTER — HOSPITAL ENCOUNTER (OUTPATIENT)
Dept: RADIATION ONCOLOGY | Facility: MEDICAL CENTER | Age: 57
End: 2022-08-31
Attending: RADIOLOGY
Payer: COMMERCIAL

## 2022-08-01 DIAGNOSIS — F43.23 ADJUSTMENT DISORDER WITH MIXED ANXIETY AND DEPRESSED MOOD: ICD-10-CM

## 2022-08-01 PROCEDURE — 90837 PSYTX W PT 60 MINUTES: CPT | Mod: 95 | Performed by: PSYCHOLOGIST

## 2022-08-01 NOTE — PROGRESS NOTES
.Lis  [facility]   Psychotherapy Progress Note      This visit was conducted via Zoom using secure and encrypted videoconferencing technology.  The patient was in their private home in the [facility].  The patient's identity was confirmed and verbal consent was obtained for this virtual visit.      Name: Melva Sauceda  MRN: [id number]  : 1965  Age: 56 y.o.  Date of assessment: 2022  PCP: LISANDRO Forde  Persons in attendance: Patient  Total session time: 54 minutes      Topics addressed in psychotherapy include: Today's session covered the topic of her current good news about her cancer prognosis.  Discussed options for her moving forward in her life. Therapist provided validation, support and feedback.  Therapist also provided a new tool for patient to utilize. The patient was encouraged to utilize the tool often at home and other settings.  There was no SI.        Objective Observations:   Participation:Active verbal participation   Grooming:Casual   Cognition:Alert and Fully Oriented   Eye Contact:Good   Mood:Euthymic   Affect:Congruent with content   Thought Process:Logical and Goal-directed   Speech:Rate within normal limits and Volume within normal limits    Current Risk:   Suicide: low   Homicide: low   Self-Harm: low   Relapse:    Safety Plan Needed:       Care Plan Updated: No    Does patient express agreement with the above plan? Yes     Diagnosis:  No diagnosis found.    Follow up appointment scheduled? Yes       Miladis Aguilar PsyD

## 2022-08-02 ENCOUNTER — APPOINTMENT (OUTPATIENT)
Dept: PHYSICAL THERAPY | Facility: REHABILITATION | Age: 57
End: 2022-08-02
Payer: COMMERCIAL

## 2022-08-08 ENCOUNTER — APPOINTMENT (OUTPATIENT)
Dept: PHYSICAL THERAPY | Facility: REHABILITATION | Age: 57
End: 2022-08-08
Attending: SURGERY
Payer: COMMERCIAL

## 2022-08-15 ENCOUNTER — HOSPITAL ENCOUNTER (OUTPATIENT)
Dept: RADIATION ONCOLOGY | Facility: MEDICAL CENTER | Age: 57
End: 2022-08-15

## 2022-08-15 PROCEDURE — 77334 RADIATION TREATMENT AID(S): CPT | Performed by: RADIOLOGY

## 2022-08-15 PROCEDURE — 77334 RADIATION TREATMENT AID(S): CPT | Mod: 26 | Performed by: RADIOLOGY

## 2022-08-15 PROCEDURE — 77470 SPECIAL RADIATION TREATMENT: CPT | Mod: 26 | Performed by: RADIOLOGY

## 2022-08-15 PROCEDURE — 77263 THER RADIOLOGY TX PLNG CPLX: CPT | Performed by: RADIOLOGY

## 2022-08-15 PROCEDURE — 77290 THER RAD SIMULAJ FIELD CPLX: CPT | Performed by: RADIOLOGY

## 2022-08-15 PROCEDURE — 77470 SPECIAL RADIATION TREATMENT: CPT | Performed by: RADIOLOGY

## 2022-08-15 PROCEDURE — 77290 THER RAD SIMULAJ FIELD CPLX: CPT | Mod: 26 | Performed by: RADIOLOGY

## 2022-08-19 PROCEDURE — 77295 3-D RADIOTHERAPY PLAN: CPT | Performed by: RADIOLOGY

## 2022-08-19 PROCEDURE — 77300 RADIATION THERAPY DOSE PLAN: CPT | Mod: 26 | Performed by: RADIOLOGY

## 2022-08-19 PROCEDURE — 77334 RADIATION TREATMENT AID(S): CPT | Mod: 26 | Performed by: RADIOLOGY

## 2022-08-19 PROCEDURE — 77334 RADIATION TREATMENT AID(S): CPT | Performed by: RADIOLOGY

## 2022-08-19 PROCEDURE — 77295 3-D RADIOTHERAPY PLAN: CPT | Mod: 26 | Performed by: RADIOLOGY

## 2022-08-19 PROCEDURE — 77300 RADIATION THERAPY DOSE PLAN: CPT | Performed by: RADIOLOGY

## 2022-08-22 ENCOUNTER — HOSPITAL ENCOUNTER (OUTPATIENT)
Dept: RADIATION ONCOLOGY | Facility: MEDICAL CENTER | Age: 57
End: 2022-08-22
Payer: COMMERCIAL

## 2022-08-22 LAB
CHEMOTHERAPY INFUSION START DATE: NORMAL
CHEMOTHERAPY RECORDS: 2
CHEMOTHERAPY RECORDS: 2
CHEMOTHERAPY RECORDS: 5000
CHEMOTHERAPY RECORDS: 5000
CHEMOTHERAPY RECORDS: NORMAL
CHEMOTHERAPY RX CANCER: NORMAL
DATE 1ST CHEMO CANCER: NORMAL
RAD ONC ARIA COURSE LAST TREATMENT DATE: NORMAL
RAD ONC ARIA COURSE TREATMENT ELAPSED DAYS: NORMAL
RAD ONC ARIA REFERENCE POINT DOSAGE GIVEN TO DATE: 2
RAD ONC ARIA REFERENCE POINT ID: NORMAL
RAD ONC ARIA REFERENCE POINT SESSION DOSAGE GIVEN: 2

## 2022-08-22 PROCEDURE — 77412 RADIATION TX DELIVERY LVL 3: CPT | Performed by: RADIOLOGY

## 2022-08-22 PROCEDURE — 77280 THER RAD SIMULAJ FIELD SMPL: CPT | Mod: 26 | Performed by: RADIOLOGY

## 2022-08-22 PROCEDURE — 77280 THER RAD SIMULAJ FIELD SMPL: CPT | Performed by: RADIOLOGY

## 2022-08-23 ENCOUNTER — HOSPITAL ENCOUNTER (OUTPATIENT)
Dept: RADIATION ONCOLOGY | Facility: MEDICAL CENTER | Age: 57
End: 2022-08-23
Payer: COMMERCIAL

## 2022-08-23 LAB
CHEMOTHERAPY INFUSION START DATE: NORMAL
CHEMOTHERAPY RECORDS: 2
CHEMOTHERAPY RECORDS: 2
CHEMOTHERAPY RECORDS: 5000
CHEMOTHERAPY RECORDS: 5000
CHEMOTHERAPY RECORDS: NORMAL
CHEMOTHERAPY RX CANCER: NORMAL
DATE 1ST CHEMO CANCER: NORMAL
RAD ONC ARIA COURSE LAST TREATMENT DATE: NORMAL
RAD ONC ARIA COURSE TREATMENT ELAPSED DAYS: NORMAL
RAD ONC ARIA REFERENCE POINT DOSAGE GIVEN TO DATE: 4
RAD ONC ARIA REFERENCE POINT ID: NORMAL
RAD ONC ARIA REFERENCE POINT SESSION DOSAGE GIVEN: 2

## 2022-08-23 PROCEDURE — 77412 RADIATION TX DELIVERY LVL 3: CPT | Performed by: RADIOLOGY

## 2022-08-24 ENCOUNTER — HOSPITAL ENCOUNTER (OUTPATIENT)
Dept: RADIATION ONCOLOGY | Facility: MEDICAL CENTER | Age: 57
End: 2022-08-24
Payer: COMMERCIAL

## 2022-08-24 VITALS — OXYGEN SATURATION: 97 % | SYSTOLIC BLOOD PRESSURE: 130 MMHG | HEART RATE: 67 BPM | DIASTOLIC BLOOD PRESSURE: 83 MMHG

## 2022-08-24 LAB
CHEMOTHERAPY INFUSION START DATE: NORMAL
CHEMOTHERAPY RECORDS: 2
CHEMOTHERAPY RECORDS: 2
CHEMOTHERAPY RECORDS: 5000
CHEMOTHERAPY RECORDS: 5000
CHEMOTHERAPY RECORDS: NORMAL
CHEMOTHERAPY RX CANCER: NORMAL
DATE 1ST CHEMO CANCER: NORMAL
RAD ONC ARIA COURSE LAST TREATMENT DATE: NORMAL
RAD ONC ARIA COURSE TREATMENT ELAPSED DAYS: NORMAL
RAD ONC ARIA REFERENCE POINT DOSAGE GIVEN TO DATE: 6
RAD ONC ARIA REFERENCE POINT ID: NORMAL
RAD ONC ARIA REFERENCE POINT SESSION DOSAGE GIVEN: 2

## 2022-08-24 PROCEDURE — 77336 RADIATION PHYSICS CONSULT: CPT | Performed by: RADIOLOGY

## 2022-08-24 PROCEDURE — 77412 RADIATION TX DELIVERY LVL 3: CPT | Performed by: RADIOLOGY

## 2022-08-24 NOTE — ON TREATMENT VISIT
ON TREATMENT NOTE  RADIATION ONCOLOGY DEPARTMENT    Patient name:  Melva Sauceda    Primary Physician:  LISANDRO Forde MRN: [id number]  CSN: [id number]   Referring physician:  Carlos Loza M.D. : 1965, 57 y.o.     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 on 2022  Histologic grading system: 3 grade system    Secondary carcinoma of bone (HCC)  Staging form: Bone - Appendicular Skeleton, Trunk, Skull, and Facial Bones, AJCC 8th Edition  - Clinical: cM1 - Signed by Raheel Arevalo M.D. on 2022      TREATMENT SUMMARY:  Aria Treatment Information          Some values may be hidden. Unless noted otherwise, only the newest values recorded on each date are displayed.           Aria Treatment Summary 22   Course First Treatment Date 2022   Course Last Treatment Date 2022   L_Supraclav [L SC DIBH] Plan from Course C1_LBreast   Fraction 3 of 25   Elapsed Course Days  @    Prescribed Fraction Dose 200 cGy   Prescribed Total Dose 5,000 cGy   L_Breast DIBH Plan from Course C1_LBreast   Fraction 3 of 25   Elapsed Course Days  @    Prescribed Fraction Dose 200 cGy   Prescribed Total Dose 5,000 cGy   Breast DPV Reference Point from Course C1_LBreast   Elapsed Course Days  @    Session Dose 200 cGy   Total Dose 600 cGy   L Breast cp Reference Point from Course C1_LBreast   Elapsed Course Days  @    Session Dose 200 cGy   Total Dose 600 cGy   L SC cp Reference Point from Course C1_LBreast   Elapsed Course Days  @    Session Dose 200 cGy   Total Dose 600 cGy   SC DPV Reference Point from Course C1_LBreast   Elapsed Course Days  @    Session Dose 200 cGy   Total Dose 600 cGy                 SUBJECTIVE:   Patient is doing well.  She does not have any changes related to her radiation.    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 on 7/7/2022    Secondary carcinoma of bone (HCC)  Staging form: Bone - Appendicular Skeleton, Trunk, Skull, and Facial Bones, AJCC 8th Edition  - Clinical: cM1 - Signed by Raheel Arevalo M.D. on 7/11/2022      PLAN:  No change in treatment plan    Disposition:  Treatment plan reviewed. Questions answered. Continue therapy outlined.     Raheel Arevalo M.D.    No orders of the defined types were placed in this encounter.

## 2022-08-25 ENCOUNTER — HOSPITAL ENCOUNTER (OUTPATIENT)
Dept: RADIATION ONCOLOGY | Facility: MEDICAL CENTER | Age: 57
End: 2022-08-25
Payer: COMMERCIAL

## 2022-08-25 LAB
CHEMOTHERAPY INFUSION START DATE: NORMAL
CHEMOTHERAPY RECORDS: 2
CHEMOTHERAPY RECORDS: 2
CHEMOTHERAPY RECORDS: 5000
CHEMOTHERAPY RECORDS: 5000
CHEMOTHERAPY RECORDS: NORMAL
CHEMOTHERAPY RX CANCER: NORMAL
DATE 1ST CHEMO CANCER: NORMAL
RAD ONC ARIA COURSE LAST TREATMENT DATE: NORMAL
RAD ONC ARIA COURSE TREATMENT ELAPSED DAYS: NORMAL
RAD ONC ARIA REFERENCE POINT DOSAGE GIVEN TO DATE: 8
RAD ONC ARIA REFERENCE POINT ID: NORMAL
RAD ONC ARIA REFERENCE POINT SESSION DOSAGE GIVEN: 2

## 2022-08-25 PROCEDURE — 77412 RADIATION TX DELIVERY LVL 3: CPT | Performed by: RADIOLOGY

## 2022-08-26 ENCOUNTER — HOSPITAL ENCOUNTER (OUTPATIENT)
Dept: RADIATION ONCOLOGY | Facility: MEDICAL CENTER | Age: 57
End: 2022-08-26
Payer: COMMERCIAL

## 2022-08-26 LAB
CHEMOTHERAPY INFUSION START DATE: NORMAL
CHEMOTHERAPY RECORDS: 2
CHEMOTHERAPY RECORDS: 2
CHEMOTHERAPY RECORDS: 5000
CHEMOTHERAPY RECORDS: 5000
CHEMOTHERAPY RECORDS: NORMAL
CHEMOTHERAPY RX CANCER: NORMAL
DATE 1ST CHEMO CANCER: NORMAL
RAD ONC ARIA COURSE LAST TREATMENT DATE: NORMAL
RAD ONC ARIA COURSE TREATMENT ELAPSED DAYS: NORMAL
RAD ONC ARIA REFERENCE POINT DOSAGE GIVEN TO DATE: 10
RAD ONC ARIA REFERENCE POINT ID: NORMAL
RAD ONC ARIA REFERENCE POINT SESSION DOSAGE GIVEN: 2

## 2022-08-26 PROCEDURE — 77412 RADIATION TX DELIVERY LVL 3: CPT | Performed by: RADIOLOGY

## 2022-08-26 PROCEDURE — 77427 RADIATION TX MANAGEMENT X5: CPT | Performed by: RADIOLOGY

## 2022-08-29 ENCOUNTER — HOSPITAL ENCOUNTER (OUTPATIENT)
Dept: RADIATION ONCOLOGY | Facility: MEDICAL CENTER | Age: 57
End: 2022-08-29
Payer: COMMERCIAL

## 2022-08-29 LAB
CHEMOTHERAPY INFUSION START DATE: NORMAL
CHEMOTHERAPY RECORDS: 2
CHEMOTHERAPY RECORDS: 2
CHEMOTHERAPY RECORDS: 5000
CHEMOTHERAPY RECORDS: 5000
CHEMOTHERAPY RECORDS: NORMAL
CHEMOTHERAPY RX CANCER: NORMAL
DATE 1ST CHEMO CANCER: NORMAL
RAD ONC ARIA COURSE LAST TREATMENT DATE: NORMAL
RAD ONC ARIA COURSE TREATMENT ELAPSED DAYS: NORMAL
RAD ONC ARIA REFERENCE POINT DOSAGE GIVEN TO DATE: 12
RAD ONC ARIA REFERENCE POINT ID: NORMAL
RAD ONC ARIA REFERENCE POINT SESSION DOSAGE GIVEN: 2

## 2022-08-29 PROCEDURE — 77417 THER RADIOLOGY PORT IMAGE(S): CPT | Performed by: RADIOLOGY

## 2022-08-29 PROCEDURE — 77412 RADIATION TX DELIVERY LVL 3: CPT | Performed by: RADIOLOGY

## 2022-08-30 ENCOUNTER — HOSPITAL ENCOUNTER (OUTPATIENT)
Dept: RADIATION ONCOLOGY | Facility: MEDICAL CENTER | Age: 57
End: 2022-08-30
Payer: COMMERCIAL

## 2022-08-30 LAB
CHEMOTHERAPY INFUSION START DATE: NORMAL
CHEMOTHERAPY RECORDS: 2
CHEMOTHERAPY RECORDS: 2
CHEMOTHERAPY RECORDS: 5000
CHEMOTHERAPY RECORDS: 5000
CHEMOTHERAPY RECORDS: NORMAL
CHEMOTHERAPY RX CANCER: NORMAL
DATE 1ST CHEMO CANCER: NORMAL
RAD ONC ARIA COURSE LAST TREATMENT DATE: NORMAL
RAD ONC ARIA COURSE TREATMENT ELAPSED DAYS: NORMAL
RAD ONC ARIA REFERENCE POINT DOSAGE GIVEN TO DATE: 14
RAD ONC ARIA REFERENCE POINT ID: NORMAL
RAD ONC ARIA REFERENCE POINT SESSION DOSAGE GIVEN: 2

## 2022-08-30 PROCEDURE — 77412 RADIATION TX DELIVERY LVL 3: CPT | Performed by: RADIOLOGY

## 2022-08-31 ENCOUNTER — APPOINTMENT (OUTPATIENT)
Dept: PHYSICAL THERAPY | Facility: REHABILITATION | Age: 57
End: 2022-08-31
Attending: SURGERY
Payer: COMMERCIAL

## 2022-08-31 ENCOUNTER — HOSPITAL ENCOUNTER (OUTPATIENT)
Dept: RADIATION ONCOLOGY | Facility: MEDICAL CENTER | Age: 57
End: 2022-08-31
Payer: COMMERCIAL

## 2022-08-31 VITALS — DIASTOLIC BLOOD PRESSURE: 76 MMHG | SYSTOLIC BLOOD PRESSURE: 127 MMHG | HEART RATE: 56 BPM | OXYGEN SATURATION: 96 %

## 2022-08-31 LAB
CHEMOTHERAPY INFUSION START DATE: NORMAL
CHEMOTHERAPY RECORDS: 2
CHEMOTHERAPY RECORDS: 2
CHEMOTHERAPY RECORDS: 5000
CHEMOTHERAPY RECORDS: 5000
CHEMOTHERAPY RECORDS: NORMAL
CHEMOTHERAPY RX CANCER: NORMAL
DATE 1ST CHEMO CANCER: NORMAL
RAD ONC ARIA COURSE LAST TREATMENT DATE: NORMAL
RAD ONC ARIA COURSE TREATMENT ELAPSED DAYS: NORMAL
RAD ONC ARIA REFERENCE POINT DOSAGE GIVEN TO DATE: 16
RAD ONC ARIA REFERENCE POINT ID: NORMAL
RAD ONC ARIA REFERENCE POINT SESSION DOSAGE GIVEN: 2

## 2022-08-31 PROCEDURE — 77412 RADIATION TX DELIVERY LVL 3: CPT | Performed by: RADIOLOGY

## 2022-08-31 PROCEDURE — 77336 RADIATION PHYSICS CONSULT: CPT | Performed by: RADIOLOGY

## 2022-08-31 NOTE — ON TREATMENT VISIT
ON TREATMENT NOTE  RADIATION ONCOLOGY DEPARTMENT    Patient name:  Melva Sauceda    Primary Physician:  LISANDRO Forde MRN: [id number]  [facility]: [id number]   Referring physician:  Carlos Loza M.D. : 1965, 57 y.o.     ENCOUNTER DATE:  22    DIAGNOSIS:    Carcinoma of overlapping sites of left breast in female, estrogen receptor positive (HCC)  Staging form: Breast, AJCC 8th Edition  - Clinical: Stage IV (cT2, cN1, cM1, G3, ER+, [place]-, HER2+) - Signed by Raheel Arevalo M.D. on 2022  Histologic grading system: 3 grade system    Secondary carcinoma of bone (HCC)  Staging form: Bone - Appendicular Skeleton, Trunk, Skull, and Facial Bones, AJCC 8th Edition  - Clinical: cM1 - Signed by Raheel Arevalo M.D. on 2022      TREATMENT SUMMARY:  Aria Treatment Information          Some values may be hidden. Unless noted otherwise, only the newest values recorded on each date are displayed.           Aria Treatment Summary 22   Course First Treatment Date 2022   Course Last Treatment Date 2022   L_Supraclav [L SC DIBH] Plan from Course C1_LBreast   Fraction    Elapsed Course Days  @    Prescribed Fraction Dose 200 cGy   Prescribed Total Dose 5,000 cGy   L_Breast DIBH Plan from Course C1_LBreast   Fraction    Elapsed Course Days  @    Prescribed Fraction Dose 200 cGy   Prescribed Total Dose 5,000 cGy   Breast DPV Reference Point from Course C1_LBreast   Elapsed Course Days  @    Session Dose 200 cGy   Total Dose 1,600 cGy   L Breast cp Reference Point from Course C1_LBreast   Elapsed Course Days  @    Session Dose 200 cGy   Total Dose 1,600 cGy   L SC cp Reference Point from Course C1_LBreast   Elapsed Course Days  @    Session Dose 200 cGy   Total Dose 1,600 cGy   SC DPV Reference Point from Course C1_LBreast   Elapsed Course Days  @    Session Dose 200 cGy   Total Dose 1,600 cGy                 SUBJECTIVE:   Patient is doing well.  She does not any changes she would attribute to her radiation.    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 on 7/7/2022    Secondary carcinoma of bone (HCC)  Staging form: Bone - Appendicular Skeleton, Trunk, Skull, and Facial Bones, AJCC 8th Edition  - Clinical: cM1 - Signed by Raheel Arevalo M.D. on 7/11/2022      PLAN:  No change in treatment plan    Disposition:  Treatment plan reviewed. Questions answered. Continue therapy outlined.     Raheel Arevalo M.D.    No orders of the defined types were placed in this encounter.

## 2022-09-01 ENCOUNTER — HOSPITAL ENCOUNTER (OUTPATIENT)
Dept: RADIATION ONCOLOGY | Facility: MEDICAL CENTER | Age: 57
End: 2022-09-01
Payer: COMMERCIAL

## 2022-09-01 ENCOUNTER — HOSPITAL ENCOUNTER (OUTPATIENT)
Dept: RADIATION ONCOLOGY | Facility: MEDICAL CENTER | Age: 57
End: 2022-09-30
Attending: RADIOLOGY
Payer: COMMERCIAL

## 2022-09-01 LAB
CHEMOTHERAPY INFUSION START DATE: NORMAL
CHEMOTHERAPY RECORDS: 2
CHEMOTHERAPY RECORDS: 2
CHEMOTHERAPY RECORDS: 5000
CHEMOTHERAPY RECORDS: 5000
CHEMOTHERAPY RECORDS: NORMAL
CHEMOTHERAPY RX CANCER: NORMAL
DATE 1ST CHEMO CANCER: NORMAL
RAD ONC ARIA COURSE LAST TREATMENT DATE: NORMAL
RAD ONC ARIA COURSE TREATMENT ELAPSED DAYS: NORMAL
RAD ONC ARIA REFERENCE POINT DOSAGE GIVEN TO DATE: 18
RAD ONC ARIA REFERENCE POINT ID: NORMAL
RAD ONC ARIA REFERENCE POINT SESSION DOSAGE GIVEN: 2

## 2022-09-01 PROCEDURE — 77412 RADIATION TX DELIVERY LVL 3: CPT | Performed by: RADIOLOGY

## 2022-09-02 ENCOUNTER — HOSPITAL ENCOUNTER (OUTPATIENT)
Dept: RADIATION ONCOLOGY | Facility: MEDICAL CENTER | Age: 57
End: 2022-09-02
Payer: COMMERCIAL

## 2022-09-02 LAB
CHEMOTHERAPY INFUSION START DATE: NORMAL
CHEMOTHERAPY RECORDS: 2
CHEMOTHERAPY RECORDS: 2
CHEMOTHERAPY RECORDS: 5000
CHEMOTHERAPY RECORDS: 5000
CHEMOTHERAPY RECORDS: NORMAL
CHEMOTHERAPY RX CANCER: NORMAL
DATE 1ST CHEMO CANCER: NORMAL
RAD ONC ARIA COURSE LAST TREATMENT DATE: NORMAL
RAD ONC ARIA COURSE TREATMENT ELAPSED DAYS: NORMAL
RAD ONC ARIA REFERENCE POINT DOSAGE GIVEN TO DATE: 20
RAD ONC ARIA REFERENCE POINT ID: NORMAL
RAD ONC ARIA REFERENCE POINT SESSION DOSAGE GIVEN: 2

## 2022-09-02 PROCEDURE — 77412 RADIATION TX DELIVERY LVL 3: CPT | Performed by: RADIOLOGY

## 2022-09-02 PROCEDURE — 77427 RADIATION TX MANAGEMENT X5: CPT | Performed by: RADIOLOGY

## 2022-09-06 ENCOUNTER — HOSPITAL ENCOUNTER (OUTPATIENT)
Dept: RADIATION ONCOLOGY | Facility: MEDICAL CENTER | Age: 57
End: 2022-09-06
Payer: COMMERCIAL

## 2022-09-06 LAB
CHEMOTHERAPY INFUSION START DATE: NORMAL
CHEMOTHERAPY RECORDS: 2
CHEMOTHERAPY RECORDS: 2
CHEMOTHERAPY RECORDS: 5000
CHEMOTHERAPY RECORDS: 5000
CHEMOTHERAPY RECORDS: NORMAL
CHEMOTHERAPY RX CANCER: NORMAL
DATE 1ST CHEMO CANCER: NORMAL
RAD ONC ARIA COURSE LAST TREATMENT DATE: NORMAL
RAD ONC ARIA COURSE TREATMENT ELAPSED DAYS: NORMAL
RAD ONC ARIA REFERENCE POINT DOSAGE GIVEN TO DATE: 22
RAD ONC ARIA REFERENCE POINT ID: NORMAL
RAD ONC ARIA REFERENCE POINT SESSION DOSAGE GIVEN: 2

## 2022-09-06 PROCEDURE — 77412 RADIATION TX DELIVERY LVL 3: CPT | Performed by: RADIOLOGY

## 2022-09-06 PROCEDURE — 77417 THER RADIOLOGY PORT IMAGE(S): CPT | Performed by: RADIOLOGY

## 2022-09-07 ENCOUNTER — HOSPITAL ENCOUNTER (OUTPATIENT)
Dept: RADIATION ONCOLOGY | Facility: MEDICAL CENTER | Age: 57
End: 2022-09-07

## 2022-09-07 VITALS — SYSTOLIC BLOOD PRESSURE: 156 MMHG | HEART RATE: 55 BPM | DIASTOLIC BLOOD PRESSURE: 80 MMHG | OXYGEN SATURATION: 94 %

## 2022-09-07 LAB
CHEMOTHERAPY INFUSION START DATE: NORMAL
CHEMOTHERAPY RECORDS: 2
CHEMOTHERAPY RECORDS: 2
CHEMOTHERAPY RECORDS: 5000
CHEMOTHERAPY RECORDS: 5000
CHEMOTHERAPY RECORDS: NORMAL
CHEMOTHERAPY RX CANCER: NORMAL
DATE 1ST CHEMO CANCER: NORMAL
RAD ONC ARIA COURSE LAST TREATMENT DATE: NORMAL
RAD ONC ARIA COURSE TREATMENT ELAPSED DAYS: NORMAL
RAD ONC ARIA REFERENCE POINT DOSAGE GIVEN TO DATE: 24
RAD ONC ARIA REFERENCE POINT ID: NORMAL
RAD ONC ARIA REFERENCE POINT SESSION DOSAGE GIVEN: 2

## 2022-09-07 PROCEDURE — 77412 RADIATION TX DELIVERY LVL 3: CPT | Performed by: RADIOLOGY

## 2022-09-07 NOTE — ON TREATMENT VISIT
ON TREATMENT NOTE  RADIATION ONCOLOGY DEPARTMENT    Patient name:  Melva Sauceda    Primary Physician:  LISANDRO Forde MRN: [id number]  CSN: [id number]   Referring physician:  No ref. provider found : 1965, 57 y.o.     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 on 2022  Histologic grading system: 3 grade system    Secondary carcinoma of bone (HCC)  Staging form: Bone - Appendicular Skeleton, Trunk, Skull, and Facial Bones, AJCC 8th Edition  - Clinical: cM1 - Signed by Raheel Arevalo M.D. on 2022      TREATMENT SUMMARY:  Aria Treatment Information          Some values may be hidden. Unless noted otherwise, only the newest values recorded on each date are displayed.           Aria Treatment Summary 22   Course First Treatment Date 2022   Course Last Treatment Date 2022   L_Supraclav [L SC DIBH] Plan from Course C1_LBreast   Fraction  of 25   Elapsed Course Days  @    Prescribed Fraction Dose 200 cGy   Prescribed Total Dose 5,000 cGy   L_Breast DIBH Plan from Course C1_LBreast   Fraction  of 25   Elapsed Course Days    Prescribed Fraction Dose 200 cGy   Prescribed Total Dose 5,000 cGy   Breast DPV Reference Point from Course C1_LBreast   Elapsed Course Days    Session Dose 200 cGy   Total Dose 2,400 cGy   L Breast cp Reference Point from Course C1_LBreast   Elapsed Course Days    Session Dose 200 cGy   Total Dose 2,400 cGy   L SC cp Reference Point from Course C1_LBreast   Elapsed Course Days    Session Dose 200 cGy   Total Dose 2,400 cGy   SC DPV Reference Point from Course C1_LBreast   Elapsed Course Days    Session Dose 200 cGy   Total Dose 2,400  cGy                SUBJECTIVE:   Patient is doing well.  She does not have any changes she would attribute to her radiation.    VITAL SIGNS:    Encounter Vitals  Blood Pressure: (!) 156/80  Pulse: (!) 55  Pulse Oximetry: 94 %  Pain Assessment 7/11/2022   Pain Score 0   Some recent data might be hidden          PHYSICAL EXAM:  No erythema    TOXICITY  Toxicity Assessment 9/7/2022 8/31/2022 8/24/2022   Toxicity Assessment Breast Breast Breast   Fatigue (lethargy, malaise, asthenia) None None None   Fever (in the absence of neutropenia) None None None   Radiation Dermatitis None None None   Lymphatics Normal Normal Normal   RT - Pain due to RT None None None   Dyspnea Normal Normal Normal         IMPRESSION:  Cancer Staging  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 on 7/7/2022    Secondary carcinoma of bone (HCC)  Staging form: Bone - Appendicular Skeleton, Trunk, Skull, and Facial Bones, AJCC 8th Edition  - Clinical: cM1 - Signed by Raheel Arevalo M.D. on 7/11/2022      PLAN:  No change in treatment plan    Disposition:  Treatment plan reviewed. Questions answered. Continue therapy outlined.     Raheel Arevalo M.D.    No orders of the defined types were placed in this encounter.

## 2022-09-08 ENCOUNTER — HOSPITAL ENCOUNTER (OUTPATIENT)
Dept: RADIATION ONCOLOGY | Facility: MEDICAL CENTER | Age: 57
End: 2022-09-08
Payer: COMMERCIAL

## 2022-09-08 LAB
CHEMOTHERAPY INFUSION START DATE: NORMAL
CHEMOTHERAPY RECORDS: 2
CHEMOTHERAPY RECORDS: 2
CHEMOTHERAPY RECORDS: 5000
CHEMOTHERAPY RECORDS: 5000
CHEMOTHERAPY RECORDS: NORMAL
CHEMOTHERAPY RX CANCER: NORMAL
DATE 1ST CHEMO CANCER: NORMAL
RAD ONC ARIA COURSE LAST TREATMENT DATE: NORMAL
RAD ONC ARIA COURSE TREATMENT ELAPSED DAYS: NORMAL
RAD ONC ARIA REFERENCE POINT DOSAGE GIVEN TO DATE: 26
RAD ONC ARIA REFERENCE POINT ID: NORMAL
RAD ONC ARIA REFERENCE POINT SESSION DOSAGE GIVEN: 2

## 2022-09-08 PROCEDURE — 77412 RADIATION TX DELIVERY LVL 3: CPT | Performed by: RADIOLOGY

## 2022-09-08 PROCEDURE — 77336 RADIATION PHYSICS CONSULT: CPT | Performed by: RADIOLOGY

## 2022-09-09 ENCOUNTER — HOSPITAL ENCOUNTER (OUTPATIENT)
Dept: RADIATION ONCOLOGY | Facility: MEDICAL CENTER | Age: 57
End: 2022-09-09
Payer: COMMERCIAL

## 2022-09-09 LAB
CHEMOTHERAPY INFUSION START DATE: NORMAL
CHEMOTHERAPY RECORDS: 2
CHEMOTHERAPY RECORDS: 2
CHEMOTHERAPY RECORDS: 5000
CHEMOTHERAPY RECORDS: 5000
CHEMOTHERAPY RECORDS: NORMAL
CHEMOTHERAPY RX CANCER: NORMAL
DATE 1ST CHEMO CANCER: NORMAL
RAD ONC ARIA COURSE LAST TREATMENT DATE: NORMAL
RAD ONC ARIA COURSE TREATMENT ELAPSED DAYS: NORMAL
RAD ONC ARIA REFERENCE POINT DOSAGE GIVEN TO DATE: 28
RAD ONC ARIA REFERENCE POINT ID: NORMAL
RAD ONC ARIA REFERENCE POINT SESSION DOSAGE GIVEN: 2

## 2022-09-09 PROCEDURE — 77412 RADIATION TX DELIVERY LVL 3: CPT | Performed by: RADIOLOGY

## 2022-09-12 ENCOUNTER — HOSPITAL ENCOUNTER (OUTPATIENT)
Dept: RADIATION ONCOLOGY | Facility: MEDICAL CENTER | Age: 57
End: 2022-09-12

## 2022-09-12 LAB
CHEMOTHERAPY INFUSION START DATE: NORMAL
CHEMOTHERAPY RECORDS: 2
CHEMOTHERAPY RECORDS: 2
CHEMOTHERAPY RECORDS: 5000
CHEMOTHERAPY RECORDS: 5000
CHEMOTHERAPY RECORDS: NORMAL
CHEMOTHERAPY RX CANCER: NORMAL
DATE 1ST CHEMO CANCER: NORMAL
RAD ONC ARIA COURSE LAST TREATMENT DATE: NORMAL
RAD ONC ARIA COURSE TREATMENT ELAPSED DAYS: NORMAL
RAD ONC ARIA REFERENCE POINT DOSAGE GIVEN TO DATE: 30
RAD ONC ARIA REFERENCE POINT ID: NORMAL
RAD ONC ARIA REFERENCE POINT SESSION DOSAGE GIVEN: 2

## 2022-09-12 PROCEDURE — 77412 RADIATION TX DELIVERY LVL 3: CPT | Performed by: RADIOLOGY

## 2022-09-12 PROCEDURE — 77427 RADIATION TX MANAGEMENT X5: CPT | Performed by: RADIOLOGY

## 2022-09-13 ENCOUNTER — HOSPITAL ENCOUNTER (OUTPATIENT)
Dept: RADIATION ONCOLOGY | Facility: MEDICAL CENTER | Age: 57
End: 2022-09-13
Payer: COMMERCIAL

## 2022-09-13 ENCOUNTER — APPOINTMENT (OUTPATIENT)
Dept: PHYSICAL THERAPY | Facility: REHABILITATION | Age: 57
End: 2022-09-13
Attending: SURGERY
Payer: COMMERCIAL

## 2022-09-13 ENCOUNTER — HOSPITAL ENCOUNTER (OUTPATIENT)
Dept: RADIATION ONCOLOGY | Facility: MEDICAL CENTER | Age: 57
End: 2022-09-13

## 2022-09-13 DIAGNOSIS — C79.51 SECONDARY CARCINOMA OF BONE (HCC): ICD-10-CM

## 2022-09-13 LAB
CHEMOTHERAPY INFUSION START DATE: NORMAL
CHEMOTHERAPY RECORDS: 2
CHEMOTHERAPY RECORDS: 2
CHEMOTHERAPY RECORDS: 5000
CHEMOTHERAPY RECORDS: 5000
CHEMOTHERAPY RECORDS: NORMAL
CHEMOTHERAPY RX CANCER: NORMAL
DATE 1ST CHEMO CANCER: NORMAL
RAD ONC ARIA COURSE LAST TREATMENT DATE: NORMAL
RAD ONC ARIA COURSE TREATMENT ELAPSED DAYS: NORMAL
RAD ONC ARIA REFERENCE POINT DOSAGE GIVEN TO DATE: 32
RAD ONC ARIA REFERENCE POINT ID: NORMAL
RAD ONC ARIA REFERENCE POINT SESSION DOSAGE GIVEN: 2

## 2022-09-13 PROCEDURE — 77417 THER RADIOLOGY PORT IMAGE(S): CPT | Performed by: RADIOLOGY

## 2022-09-13 PROCEDURE — 77334 RADIATION TREATMENT AID(S): CPT | Performed by: RADIOLOGY

## 2022-09-13 PROCEDURE — 77263 THER RADIOLOGY TX PLNG CPLX: CPT | Performed by: RADIOLOGY

## 2022-09-13 PROCEDURE — 77290 THER RAD SIMULAJ FIELD CPLX: CPT | Performed by: RADIOLOGY

## 2022-09-13 PROCEDURE — 77334 RADIATION TREATMENT AID(S): CPT | Mod: 26 | Performed by: RADIOLOGY

## 2022-09-13 PROCEDURE — 77470 SPECIAL RADIATION TREATMENT: CPT | Mod: 26 | Performed by: RADIOLOGY

## 2022-09-13 PROCEDURE — 77412 RADIATION TX DELIVERY LVL 3: CPT | Performed by: RADIOLOGY

## 2022-09-13 PROCEDURE — 77290 THER RAD SIMULAJ FIELD CPLX: CPT | Mod: 26 | Performed by: RADIOLOGY

## 2022-09-13 PROCEDURE — 77470 SPECIAL RADIATION TREATMENT: CPT | Performed by: RADIOLOGY

## 2022-09-13 NOTE — RADIATION COMPLETION NOTES
PATIENT NAME Melva Sauceda   PRIMARY PHYSICIAN Melva Rayo [id number]   REFERRING PHYSICIAN No ref. provider found 1965     DATE OF SERVICE: 9/13/2022    DIAGNOSIS:  Carcinoma of overlapping sites of left breast in female, estrogen receptor positive (HCC)  Staging form: Breast, AJCC 8th Edition  - Clinical: Stage IV (cT2, cN1, cM1, G3, ER+, [place]-, HER2+) - Signed by Raheel Arevalo M.D. on 7/7/2022  Histologic grading system: 3 grade system    Secondary carcinoma of bone (HCC)  Staging form: Bone - Appendicular Skeleton, Trunk, Skull, and Facial Bones, AJCC 8th Edition  - Clinical: cM1 - Signed by Raheel Arevalo M.D. on 7/11/2022         SPECIAL TREATMENT PROCEDURE NOTE:  Considerable additional effort required in the management of this case because of administration of Stereotactic Radiotherapy, which may result in increased normal tissue toxicity and require greater effort in contouring and treatment because of greater precision.

## 2022-09-13 NOTE — RADIATION COMPLETION NOTES
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 on 7/7/2022  Histologic grading system: 3 grade system    Secondary carcinoma of bone (HCC)  Staging form: Bone - Appendicular Skeleton, Trunk, Skull, and Facial Bones, AJCC 8th Edition  - Clinical: cM1 - Signed by Raheel Arevalo M.D. on 7/11/2022       DATE OF SERVICE: 9/13/2022    TYPE OF SIMULATION: Thorax    GOAL OF TREATMENT:   [x] Curative  [] Palliative  [] Oligometastatic    CONTRAST:    [] IV Contrast*  [] Oral Contrast                POSITION:    [x]  Supine  [] Prone     COMPLEX:  [x] Complex Blocking   []Arcs  [] Custom Blocks  [] >3 Sites    PROCEDURE: Patient positioned on CT table in a body fixx immobilization device. CT acquired thorough the entire volume of interest.  Images reviewed and exported to treatment planning system.    I have personally reviewed the relevant data, performed the target localization, and determined all relevant factors for this patient’s simulation.    *Omnipaque 80 -100cc IVP in conjunction with 500cc NS

## 2022-09-13 NOTE — CT SIMULATION
PATIENT NAME Melva Sauceda   PRIMARY PHYSICIAN Melva Rayo [id number]   REFERRING PHYSICIAN No ref. 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 on 7/7/2022  Histologic grading system: 3 grade system    Secondary carcinoma of bone (HCC)  Staging form: Bone - Appendicular Skeleton, Trunk, Skull, and Facial Bones, AJCC 8th Edition  - Clinical: cM1 - Signed by Raheel Arevalo M.D. on 7/11/2022         Treatment Planning CT Simulation        Order Questions       Question Answer    Is this for a new course of treatment? Yes    Is this an Addendum? No    Implanted Device/Pacemaker No    Simulation Status Initial    Treatment Site Spine - Thoracic    Laterality Midline    Treatment Technique SBRT    Other Technique(s) SBRT    Treatment Pattern/Frequency Single Fx    Concurrent Chemotherapy No    CT Technique 3D    Slice Thickness 2mm    Scan Extent Chest    Treatment Device(s) Body Fix     OmniBoard    Patient Attire Gown    Patient Position Supine    Patient Orientation Head First    Arm Position Up    Treatment Machine TB1 - STx    Treatment Image Guidance CBCT    Frequency (CBCT) Daily    Image Guidance Match Bone    Treatment Planning Image Fusion CT/PET    Special Physics Consult Stereotactic    Other Orders Special Tx Procedure

## 2022-09-13 NOTE — RADIATION COMPLETION NOTES
Clinical Treatment Planning Note    DATE OF SERVICE: 9/13/2022    DIAGNOSIS:  Carcinoma of overlapping sites of left breast in female, estrogen receptor positive (HCC)  Staging form: Breast, AJCC 8th Edition  - Clinical: Stage IV (cT2, cN1, cM1, G3, ER+, [place]-, HER2+) - Signed by Raheel Arevalo M.D. on 7/7/2022  Histologic grading system: 3 grade system    Secondary carcinoma of bone (HCC)  Staging form: Bone - Appendicular Skeleton, Trunk, Skull, and Facial Bones, AJCC 8th Edition  - Clinical: cM1 - Signed by Raheel Arevalo M.D. on 7/11/2022         IMAGING REVIEWED:  [x] CT     [] MRI     [x] PET/CT     [] BONE SCAN     [] MAMMO     [] OTHER      TREATMENT INTENT:   [x] CURATIVE     [] MAINTENANCE     []  PALLIATIVE      []  SUPPORTIVE     []  PROPHYLACTIC     [] BENIGN     []  CONSOLIDATIVE      [] DEFINITIVE   []  OLOGIMETASTATIC      LINE OF TREATMENT:  [x] ADJUVANT   [] DEFINITIVE   [] NEOADJUVANT   [] RE-TREATMENT      TECHNIQUE PLANNED:  [] IMRT   [] 3D   [x] SBRT   [] SRS/SRT   [] HDR   [] ELECTRON       IMRT JUSTIFICATION:  []   An immediately adjacent area has been previously irradiated and abutting portals must be established with high precision.    []  Dose escalation is planned to deliver radiation doses in excess of those commonly utilized for similar tumors with conventional treatment.    []  The target volume is concave or convex, and the critical normal tissues are within or around that convexity or concavity.    []  The target volume is in close proximity to critical structures that must be protected.    []  The volume of interest must be covered with narrow margins to adequately protect  immediately adjacent structures.      FIELDS & BLOCKING:  [x] COMPLEX BLOCKS     []  = 3 TX AREAS     []  ARCS     []  CUSTOM SHEILD        []  SIMPLE BLOCK      CHEMOTHERAPY:  []  CONCURRENT     []  INDUCTION     [] SEQUENTIAL     []  <30 DAYS FROM XRT      NOTES:  OAR CONSTRAINTS: (GUIDELINES ONLY NOT  ABSOLUTE) QUANTEC OR AS STATED     One fraction Three fractions Five fractions    Serial Issue Max critical volume above threshold Threshold dose    (Gy) Max point dose (Gy)^a  Threshold dose   (Gy) Max point dose (Gy)^a Threshold dose   (Gy) Max point dose (Gy)^a End point (greater than or equal to Grade3)   Optic pathway <0.2 cc 8 10 15.3 (5.1 Gy/fx) 17.4 (5.8 Gy/fx) 23 (4.6 Gy/fx) 25 (5 Gy/fx) Neuritis    [place]      9  17.1 (5.7 Gy/fx)  25 (5 Gy/fx) Hearing loss    Brainstem  (non medulla) <0.5 cc 10 15 18 (6 Gy/fx) 23.1 (7.7 Gy/fx) 23 (4.6 Gy/fx)   31 (6.2 Gy/fx) Cranial neuropathy   Spinal chord   and medulla <0.35 cc      <1.2 cc 10      7 14 18 (6 Gy/fx)    12.3 (4.1 Gy/fx) 21.9 (7.3 Gy/fx) 23 (4.6 Gy/fx)    14.5 (2.9 Gy/fx) 30 (6 Gy/fx) Myelitis   Spinal cord subvolume (5-6mm above and below level treated per August) <10% of  subvolume 10 14 18 (6 Gy/fx) 21.9 (7.3 Gy/fx) 23 (4.6 Gy/fx) 30 (6 Gy/fx) Myelitis   Cauda equina  <5 cc 14 16 21.9 (7.3 Gy/fx) 24 (8 Gy/fx) 30 (6 Gy/fx) 32 (6.4 Gy/fx) Neuritis   Sacral plexus <5 cc 14.4 16 22.5 (7.5 Gy/fx) 24 (8 (Gy/fx) 30 (6 Gy/fx) 32 (6.4 Gy/fx) Neuropathy   Esophagus^b <5 cc 11.9 15.4 17.7 (5.9 Gy/fx) 25.2 (8.4 Gy/fx) 19.5 (3.9 Gy/fx) 35 (7 Gy/fx) Stenosis/fistula   Brachial plexus <3 cc 14 17.5 20.4 (6.8 Gy/fx 24 (8 Gy/fx) 27 (5.4 Gy/fx) 30.5 (6.1 Gy/fx) Neuropathy   Heart/ pericardium <15 cc 16 22 24 (8 Gy/fx) 30 (10 Gy/fx) 32 (6.4 Gy/fx) 38 (7.6 Gy/fx) Pericarditis   Great vessels <10 cc 31 37 39 (13 Gy/fx) 45 (15 Gy/fx) 47 (9.4 Gy/fx) 53 (10.6 Gy/fx) Aneurysm   Trachea and large bronchus^b <4 cc 10.5 20.2 15 (5 Gy/fx) 30 (10 Gy/fx) 16.5 (3.3 Gy/fx) 40 (8 Gy/fx) Stenosis/ fistula   Bronchus- smaller airways <0.5 cc 12.4 13.3 18.9 (6.3 Gy/fx) 23.1 (7.7 Gy/fx) 21 (4.2 Gy/fx) 33 (6.6 Gy/fx) Stenosis with atelectasis   Rib <1 cc      <30 cc 22 30 28.8 (9.6 Gy/fx)    30.0 (10.0 Gy/fx) 36.9 (12.3 Gy/fx) 35 (7 Gy/fx) 43 (8.6 Gy/fx) Pain or fracture   Skin <10 cc 23 26  30 (10 Gy/fx) 33 (11 Gy/fx) 36.5 (7.3 Gy/fx) 39.5 (7.9 Gy/fx) Ulceration   Stomach <10 cc 11.2 12.4 16.5 (5.5 Gy/fx) 22.2 (7.4 Gy/fx) 18 (3.6 Gy/fx) 32 (6.4 Gy/fx) Ulceration/fistula   Duodenum^b <5 cc      <10 cc 11.2      9 12.4 16.5 (5.5 Gy/fx)    11.4 (3.8 Gy/fx) 22.2 (7.4 Gy/fx) 18 (3.6 Gy/fx)    12.5 (2.5 Gy/fx) 32 (6.4 Gy/fx) Ulceration   Jejunum/ Ileum^b  <5 cc 11.9 15.4 17.7 (5.9 Gy/fx) 25.2 (8.4 Gy/fx) 19.5 (3.9 Gy/fx) 35 (7 Gy/fx) Enteritis/ obstruction   Colon^b <20 cc 14.3 18.4 24 (8 Gy/fx) 28.2 (9.4 gy/fx) 25 (5 Gy/fx) 38 (7.6 Gy/fx) Colitis/ fistula   Rectum^b <20 cc 14.3 18.4 24 (8 Gy/fx) 28.2 (9.4 gy/fx) 25 (5 Gy/fx) 38 (7.6 Gy/fx) Proctitis/ fistula   Bladder wall <15 cc 11.4 18.4 16.8 (5.6 Gy/fx) 28.2 (9.4 Gy/fx) 18.3 (3.65 Gy/fx) 38 (7.6 Gy/fx) Cystitis/ fistula   Penile bulb <3 cc 14 34 21.9 (7.3 Gy/fx) 42 (14 Gy/fx) 30 (6 Gy/fx) 50 (10 Gy/fx) Impotence   Femoral heads (right and left) <10 cc 14  21.9 (7.3 Gy/fx)  30 (6 Gy/fx)  Necrosis   Renal hilium/ vascular trunk <2/3 volume 1.6 18.6 (6.2 Gy/fx)   23 (4.6 Gy/fx)  Malignant hypertension         One fraction Three fractions Five fractions    Parallel tissue Max critical volume below threshold Threshold dose (Gy) Max point dose (Gy)^a Threshold dose (Gy) Max point dose (Gy)^a Threshold dose (Gy) Max point dose (Gy)^a End point (greater than or equal to Grade 3)   Lung (right and left) 1500 cc 7 NA- Parallel tissue 11.6 (2.96 Gy/fx) NA- Parallel tissue 12.5 (2.5 Gy/fx) NA- Parallel tissue Basic lung function    Lung (right and left) 1000 cc 7.4 NA- Parallel tissue 12.4 (3.1 Gy/fx) NA- Parallel tissue 13.5 (2.7 Gy/fx) NA- Parallel tissue Pneumonitis   Liver 700 cc 9.1 NA- Parallel tissue 19.2 (4.8 Gy/fx) NA- Parallel tissue 21 (4.2 Gy/fx) NA- Parallel tissue Basic liver function   Renal Cortex (right and left) 200 cc 8.4 NA- Parallel tissue 16 (4 Gy/fx) NA- Parallel tissue 17.5 (3.5 Gy/fx) NA- Parallel tissue Basic renal function   ^a “Point”  defined as 0.035 cc or less.  ^b Avoid circumferential irradiation.

## 2022-09-14 ENCOUNTER — HOSPITAL ENCOUNTER (OUTPATIENT)
Dept: RADIATION ONCOLOGY | Facility: MEDICAL CENTER | Age: 57
End: 2022-09-14
Payer: COMMERCIAL

## 2022-09-14 VITALS — HEART RATE: 54 BPM | SYSTOLIC BLOOD PRESSURE: 146 MMHG | OXYGEN SATURATION: 94 % | DIASTOLIC BLOOD PRESSURE: 74 MMHG

## 2022-09-14 LAB
CHEMOTHERAPY INFUSION START DATE: NORMAL
CHEMOTHERAPY RECORDS: 2
CHEMOTHERAPY RECORDS: 2
CHEMOTHERAPY RECORDS: 5000
CHEMOTHERAPY RECORDS: 5000
CHEMOTHERAPY RECORDS: NORMAL
CHEMOTHERAPY RX CANCER: NORMAL
DATE 1ST CHEMO CANCER: NORMAL
RAD ONC ARIA COURSE LAST TREATMENT DATE: NORMAL
RAD ONC ARIA COURSE TREATMENT ELAPSED DAYS: NORMAL
RAD ONC ARIA REFERENCE POINT DOSAGE GIVEN TO DATE: 34
RAD ONC ARIA REFERENCE POINT ID: NORMAL
RAD ONC ARIA REFERENCE POINT SESSION DOSAGE GIVEN: 2

## 2022-09-14 PROCEDURE — 77412 RADIATION TX DELIVERY LVL 3: CPT | Performed by: RADIOLOGY

## 2022-09-14 NOTE — ON TREATMENT VISIT
ON TREATMENT NOTE  RADIATION ONCOLOGY DEPARTMENT    Patient name:  Melva Sauceda    Primary Physician:  LISANDRO Forde MRN: [id number]  CSN: [id number]   Referring physician:  No ref. provider found : 1965, 57 y.o.     ENCOUNTER DATE:  22    DIAGNOSIS:    Carcinoma of overlapping sites of left breast in female, estrogen receptor positive (HCC)  Staging form: Breast, AJCC 8th Edition  - Clinical: Stage IV (cT2, cN1, cM1, G3, ER+, [place]-, HER2+) - Signed by Raheel Arevalo M.D. on 2022  Histologic grading system: 3 grade system    Secondary carcinoma of bone (HCC)  Staging form: Bone - Appendicular Skeleton, Trunk, Skull, and Facial Bones, AJCC 8th Edition  - Clinical: cM1 - Signed by Raheel Arevalo M.D. on 2022      TREATMENT SUMMARY:  Aria Treatment Information          Some values may be hidden. Unless noted otherwise, only the newest values recorded on each date are displayed.           Aria Treatment Summary 22   Course First Treatment Date 2022   Course Last Treatment Date 2022   L_Supraclav [L SC DIBH] Plan from Course C1_LBreast   Fraction 17 of 25   Elapsed Course Days  @    Prescribed Fraction Dose 200 cGy   Prescribed Total Dose 5,000 cGy   L_Breast DIBH Plan from Course C1_LBreast   Fraction 17 of 25   Elapsed Course Days    Prescribed Fraction Dose 200 cGy   Prescribed Total Dose 5,000 cGy   Breast DPV Reference Point from Course C1_LBreast   Elapsed Course Days    Session Dose 200 cGy   Total Dose 3,400 cGy   L Breast cp Reference Point from Course C1_LBreast   Elapsed Course Days    Session Dose 200 cGy   Total Dose 3,400 cGy   L SC cp Reference Point from Course C1_LBreast   Elapsed Course Days    Session Dose 200 cGy   Total Dose 3,400 cGy   SC DPV Reference Point from Course C1_LBreast   Elapsed Course Days    Session Dose 200 cGy   Total Dose  3,400 cGy                SUBJECTIVE:   Patient is doing well.  She does not have any changes she would attribute to her radiation.    VITAL SIGNS:    Encounter Vitals  Blood Pressure: (!) 146/74  Pulse: (!) 54  Pulse Oximetry: 94 %  Pain Assessment 7/11/2022   Pain Score 0   Some recent data might be hidden          PHYSICAL EXAM:  No erythema    TOXICITY  Toxicity Assessment 9/14/2022 9/7/2022 8/31/2022 8/24/2022   Toxicity Assessment Breast Breast Breast Breast   Fatigue (lethargy, malaise, asthenia) None None None None   Fever (in the absence of neutropenia) None None None None   Radiation Dermatitis None None None None   Lymphatics Normal Normal Normal Normal   RT - Pain due to RT None None None None   Dyspnea Normal Normal Normal Normal         IMPRESSION:  Cancer Staging  Carcinoma of overlapping sites of left breast in female, estrogen receptor positive (HCC)  Staging form: Breast, AJCC 8th Edition  - Clinical: Stage IV (cT2, cN1, cM1, G3, ER+, [place]-, HER2+) - Signed by Raheel Arevalo M.D. on 7/7/2022    Secondary carcinoma of bone (HCC)  Staging form: Bone - Appendicular Skeleton, Trunk, Skull, and Facial Bones, AJCC 8th Edition  - Clinical: cM1 - Signed by Raheel Arevalo M.D. on 7/11/2022      PLAN:  No change in treatment plan    Disposition:  Treatment plan reviewed. Questions answered. Continue therapy outlined.     Raheel Arevalo M.D.    No orders of the defined types were placed in this encounter.

## 2022-09-15 ENCOUNTER — HOSPITAL ENCOUNTER (OUTPATIENT)
Dept: RADIATION ONCOLOGY | Facility: MEDICAL CENTER | Age: 57
End: 2022-09-15
Payer: COMMERCIAL

## 2022-09-15 LAB
CHEMOTHERAPY INFUSION START DATE: NORMAL
CHEMOTHERAPY RECORDS: 2
CHEMOTHERAPY RECORDS: 2
CHEMOTHERAPY RECORDS: 5000
CHEMOTHERAPY RECORDS: 5000
CHEMOTHERAPY RECORDS: NORMAL
CHEMOTHERAPY RX CANCER: NORMAL
DATE 1ST CHEMO CANCER: NORMAL
RAD ONC ARIA COURSE LAST TREATMENT DATE: NORMAL
RAD ONC ARIA COURSE TREATMENT ELAPSED DAYS: NORMAL
RAD ONC ARIA REFERENCE POINT DOSAGE GIVEN TO DATE: 36
RAD ONC ARIA REFERENCE POINT ID: NORMAL
RAD ONC ARIA REFERENCE POINT SESSION DOSAGE GIVEN: 2

## 2022-09-15 PROCEDURE — 77412 RADIATION TX DELIVERY LVL 3: CPT | Performed by: RADIOLOGY

## 2022-09-15 PROCEDURE — 77336 RADIATION PHYSICS CONSULT: CPT | Performed by: RADIOLOGY

## 2022-09-16 ENCOUNTER — HOSPITAL ENCOUNTER (OUTPATIENT)
Dept: RADIATION ONCOLOGY | Facility: MEDICAL CENTER | Age: 57
End: 2022-09-16
Payer: COMMERCIAL

## 2022-09-16 LAB
CHEMOTHERAPY INFUSION START DATE: NORMAL
CHEMOTHERAPY RECORDS: 2
CHEMOTHERAPY RECORDS: 2
CHEMOTHERAPY RECORDS: 5000
CHEMOTHERAPY RECORDS: 5000
CHEMOTHERAPY RECORDS: NORMAL
CHEMOTHERAPY RX CANCER: NORMAL
DATE 1ST CHEMO CANCER: NORMAL
RAD ONC ARIA COURSE LAST TREATMENT DATE: NORMAL
RAD ONC ARIA COURSE TREATMENT ELAPSED DAYS: NORMAL
RAD ONC ARIA REFERENCE POINT DOSAGE GIVEN TO DATE: 38
RAD ONC ARIA REFERENCE POINT ID: NORMAL
RAD ONC ARIA REFERENCE POINT SESSION DOSAGE GIVEN: 2

## 2022-09-16 PROCEDURE — 77412 RADIATION TX DELIVERY LVL 3: CPT | Performed by: RADIOLOGY

## 2022-09-19 ENCOUNTER — HOSPITAL ENCOUNTER (OUTPATIENT)
Dept: RADIATION ONCOLOGY | Facility: MEDICAL CENTER | Age: 57
End: 2022-09-19
Payer: COMMERCIAL

## 2022-09-19 LAB
CHEMOTHERAPY INFUSION START DATE: NORMAL
CHEMOTHERAPY RECORDS: 2
CHEMOTHERAPY RECORDS: 2
CHEMOTHERAPY RECORDS: 5000
CHEMOTHERAPY RECORDS: 5000
CHEMOTHERAPY RECORDS: NORMAL
CHEMOTHERAPY RX CANCER: NORMAL
DATE 1ST CHEMO CANCER: NORMAL
RAD ONC ARIA COURSE LAST TREATMENT DATE: NORMAL
RAD ONC ARIA COURSE TREATMENT ELAPSED DAYS: NORMAL
RAD ONC ARIA REFERENCE POINT DOSAGE GIVEN TO DATE: 40
RAD ONC ARIA REFERENCE POINT ID: NORMAL
RAD ONC ARIA REFERENCE POINT SESSION DOSAGE GIVEN: 2

## 2022-09-19 PROCEDURE — 77427 RADIATION TX MANAGEMENT X5: CPT | Performed by: RADIOLOGY

## 2022-09-19 PROCEDURE — 77412 RADIATION TX DELIVERY LVL 3: CPT | Performed by: RADIOLOGY

## 2022-09-20 ENCOUNTER — PHYSICAL THERAPY (OUTPATIENT)
Dept: PHYSICAL THERAPY | Facility: REHABILITATION | Age: 57
End: 2022-09-20
Attending: SURGERY
Payer: COMMERCIAL

## 2022-09-20 ENCOUNTER — HOSPITAL ENCOUNTER (OUTPATIENT)
Dept: RADIATION ONCOLOGY | Facility: MEDICAL CENTER | Age: 57
End: 2022-09-20

## 2022-09-20 DIAGNOSIS — L90.5 AXILLARY WEB SYNDROME: ICD-10-CM

## 2022-09-20 DIAGNOSIS — L76.82 AXILLARY WEB SYNDROME: ICD-10-CM

## 2022-09-20 LAB
CHEMOTHERAPY INFUSION START DATE: NORMAL
CHEMOTHERAPY RECORDS: 2
CHEMOTHERAPY RECORDS: 2
CHEMOTHERAPY RECORDS: 5000
CHEMOTHERAPY RECORDS: 5000
CHEMOTHERAPY RECORDS: NORMAL
CHEMOTHERAPY RX CANCER: NORMAL
DATE 1ST CHEMO CANCER: NORMAL
RAD ONC ARIA COURSE LAST TREATMENT DATE: NORMAL
RAD ONC ARIA COURSE TREATMENT ELAPSED DAYS: NORMAL
RAD ONC ARIA REFERENCE POINT DOSAGE GIVEN TO DATE: 42
RAD ONC ARIA REFERENCE POINT ID: NORMAL
RAD ONC ARIA REFERENCE POINT SESSION DOSAGE GIVEN: 2

## 2022-09-20 PROCEDURE — 97110 THERAPEUTIC EXERCISES: CPT

## 2022-09-20 PROCEDURE — 77412 RADIATION TX DELIVERY LVL 3: CPT | Performed by: RADIOLOGY

## 2022-09-20 PROCEDURE — 97140 MANUAL THERAPY 1/> REGIONS: CPT

## 2022-09-20 PROCEDURE — 77417 THER RADIOLOGY PORT IMAGE(S): CPT | Performed by: RADIOLOGY

## 2022-09-20 NOTE — OP THERAPY DAILY TREATMENT
Outpatient Physical Therapy  LYMPHEDEMA THERAPY DAILY TREATMENT     [address]  [address]  [place] [address]  Phone:  [phone]  Fax:  [phone]    Date: 09/20/2022    Patient: Melva Sauceda  YOB: 1965  MRN: [id number]     Time Calculation    Start time: 0948  Stop time: 1035 Time Calculation (min): 47 minutes         Chief Complaint: Shoulder Problem    Visit #: 2    Subjective:   History of Present Illness:     Mechanism of injury:  Pt reporting she has been having trouble reaching and feels that she is having some cording.     Lymphedema Objective    Left Upper Extremity Circumferential Measurements 7/27  Other: cm  Areola: cm  Breast Crease: cm  Axilla: 32.3 cm  Upper Proximal Humerus: 31.2 cm  Distal Humerus: 27.9 cm  Elbow: 25.4 cm  Mid-Forearm: 24.6 cm  Wrist: 15.7 cm  Distal Bucio Crease: 18.9 cm  Total: 176 cm     Right Upper Extremity Circumferential Measurements 7/27  Other: cm  Areola: cm  Breast Crease: cm  Axilla: 33.1 cm  Upper Proximal Humerus: 32.3 cm  Distal Humerus: 29.5 cm  Elbow: 26 cm  Mid-Forearm: 26 cm  Wrist: 15.9 cm  Distal Bucio Crease: 19.2 cm  Total: cm 182    Therapeutic Exercises (CPT 07414):     1. Foam Roll Sequence, x5ea, handout provided; HEP    Therapeutic Treatments and Modalities:     1. Manual Therapy (CPT 11918)    Therapeutic Treatment and Modalities Summary: -cording techniques from axilla to wrist on L UE. Hooking, tension, all light.   -taped cord with Kinesiotape anchoring at elbow with 35% stretch, ending in medial axilla. Pt has been instructed on appropriate tape removal including with water or oil. Emphasized to not tear tape up. Instructed to remove within 5 days.   Time-based treatments/modalities:    Physical Therapy Timed Treatment Charges  Manual therapy minutes (CPT 37908): 37 minutes  Therapeutic exercise minutes (CPT 19257): 10 minutes      Assessment and Plan:   Assessment details:  Pt  does present with palpable cords to her L UE extending to her wrist. With her current exercise regimen as well as the addition of cord-specific exercises, expect these to reduce. Will follow up next week to ensure progression.     Plan:  Therapy options:  Physical therapy treatment to continue  Discussed with:  Patient

## 2022-09-21 ENCOUNTER — HOSPITAL ENCOUNTER (OUTPATIENT)
Dept: RADIATION ONCOLOGY | Facility: MEDICAL CENTER | Age: 57
End: 2022-09-21
Payer: COMMERCIAL

## 2022-09-21 VITALS — HEART RATE: 66 BPM | OXYGEN SATURATION: 98 % | SYSTOLIC BLOOD PRESSURE: 126 MMHG | DIASTOLIC BLOOD PRESSURE: 83 MMHG

## 2022-09-21 LAB
CHEMOTHERAPY INFUSION START DATE: NORMAL
CHEMOTHERAPY INFUSION STOP DATE: NORMAL
CHEMOTHERAPY RECORDS: 2
CHEMOTHERAPY RECORDS: 2
CHEMOTHERAPY RECORDS: 5000
CHEMOTHERAPY RECORDS: 5000
CHEMOTHERAPY RECORDS: NORMAL
CHEMOTHERAPY RX CANCER: NORMAL
DATE 1ST CHEMO CANCER: NORMAL
RAD ONC ARIA COURSE LAST TREATMENT DATE: NORMAL
RAD ONC ARIA COURSE TREATMENT ELAPSED DAYS: NORMAL
RAD ONC ARIA REFERENCE POINT DOSAGE GIVEN TO DATE: 44
RAD ONC ARIA REFERENCE POINT ID: NORMAL
RAD ONC ARIA REFERENCE POINT SESSION DOSAGE GIVEN: 2

## 2022-09-21 PROCEDURE — 77307 TELETHX ISODOSE PLAN CPLX: CPT | Performed by: RADIOLOGY

## 2022-09-21 PROCEDURE — 77334 RADIATION TREATMENT AID(S): CPT | Performed by: RADIOLOGY

## 2022-09-21 PROCEDURE — 77307 TELETHX ISODOSE PLAN CPLX: CPT | Mod: 26 | Performed by: RADIOLOGY

## 2022-09-21 PROCEDURE — 77334 RADIATION TREATMENT AID(S): CPT | Mod: 26 | Performed by: RADIOLOGY

## 2022-09-21 PROCEDURE — 77412 RADIATION TX DELIVERY LVL 3: CPT | Performed by: RADIOLOGY

## 2022-09-21 NOTE — ON TREATMENT VISIT
ON TREATMENT NOTE  RADIATION ONCOLOGY DEPARTMENT    Patient name:  Melva Sauceda    Primary Physician:  LISANDRO Forde MRN: [id number]  CSN: [id number]   Referring physician:  No ref. provider found : 1965, 57 y.o.     ENCOUNTER DATE:  22    DIAGNOSIS:    Carcinoma of overlapping sites of left breast in female, estrogen receptor positive (HCC)  Staging form: Breast, AJCC 8th Edition  - Clinical: Stage IV (cT2, cN1, cM1, G3, ER+, [place]-, HER2+) - Signed by Raheel Arevalo M.D. on 2022  Histologic grading system: 3 grade system    Secondary carcinoma of bone (HCC)  Staging form: Bone - Appendicular Skeleton, Trunk, Skull, and Facial Bones, AJCC 8th Edition  - Clinical: cM1 - Signed by Raheel Arevalo M.D. on 2022      TREATMENT SUMMARY:  Aria Treatment Information          Some values may be hidden. Unless noted otherwise, only the newest values recorded on each date are displayed.           Aria Treatment Summary 22   Course First Treatment Date 2022   Course Last Treatment Date 2022   L_Supraclav [L SC DIBH] Plan from Course C1_LBreast   Fraction    Elapsed Course Days  @    Prescribed Fraction Dose 200 cGy   Prescribed Total Dose 5,000 cGy   L_Breast DIBH Plan from Course C1_LBreast   Fraction    Elapsed Course Days  @    Prescribed Fraction Dose 200 cGy   Prescribed Total Dose 5,000 cGy   Breast DPV Reference Point from Course C1_LBreast   Elapsed Course Days  @    Session Dose 200 cGy   Total Dose 4,400 cGy   L Breast cp Reference Point from Course C1_LBreast   Elapsed Course Days  @    Session Dose 200 cGy   Total Dose 4,400 cGy   L SC cp Reference Point from Course C1_LBreast   Elapsed Course Days  @    Session Dose 200 cGy   Total Dose 4,400 cGy   SC DPV Reference Point from Course C1_LBreast   Elapsed Course Days  @    Session Dose 200 cGy   Total Dose  4,400 cGy                SUBJECTIVE:   Patient is doing well.  She has mild erythema in the radiation treatment area.    VITAL SIGNS:    Encounter Vitals  Blood Pressure: 126/83  Pulse: 66  Pulse Oximetry: 98 %  Pain Assessment 7/11/2022   Pain Score 0   Some recent data might be hidden          PHYSICAL EXAM:  Mild erythema in the treatment field    TOXICITY  Toxicity Assessment 9/21/2022 9/14/2022 9/7/2022 8/31/2022 8/24/2022   Toxicity Assessment Breast Breast Breast Breast Breast   Fatigue (lethargy, malaise, asthenia) None None None None None   Fever (in the absence of neutropenia) None None None None None   Radiation Dermatitis Faint erythema or dry desquamation None None None None   Lymphatics Normal Normal Normal Normal Normal   RT - Pain due to RT None None None None None   Dyspnea Normal Normal Normal Normal Normal         IMPRESSION:  Cancer Staging  Carcinoma of overlapping sites of left breast in female, estrogen receptor positive (HCC)  Staging form: Breast, AJCC 8th Edition  - Clinical: Stage IV (cT2, cN1, cM1, G3, ER+, [place]-, HER2+) - Signed by Raheel Arevalo M.D. on 7/7/2022    Secondary carcinoma of bone (HCC)  Staging form: Bone - Appendicular Skeleton, Trunk, Skull, and Facial Bones, AJCC 8th Edition  - Clinical: cM1 - Signed by Raheel Arevalo M.D. on 7/11/2022      PLAN:  No change in treatment plan    Disposition:  Treatment plan reviewed. Questions answered. Continue therapy outlined.     Raheel rAevalo M.D.    No orders of the defined types were placed in this encounter.

## 2022-09-22 ENCOUNTER — HOSPITAL ENCOUNTER (OUTPATIENT)
Dept: RADIATION ONCOLOGY | Facility: MEDICAL CENTER | Age: 57
End: 2022-09-22
Payer: COMMERCIAL

## 2022-09-22 ENCOUNTER — HOSPITAL ENCOUNTER (OUTPATIENT)
Dept: RADIATION ONCOLOGY | Facility: MEDICAL CENTER | Age: 57
End: 2022-09-22

## 2022-09-22 LAB
CHEMOTHERAPY INFUSION START DATE: NORMAL
CHEMOTHERAPY INFUSION START DATE: NORMAL
CHEMOTHERAPY INFUSION STOP DATE: NORMAL
CHEMOTHERAPY RECORDS: 2
CHEMOTHERAPY RECORDS: 2
CHEMOTHERAPY RECORDS: 20
CHEMOTHERAPY RECORDS: 2000
CHEMOTHERAPY RECORDS: 5000
CHEMOTHERAPY RECORDS: 5000
CHEMOTHERAPY RECORDS: NORMAL
CHEMOTHERAPY RX CANCER: NORMAL
CHEMOTHERAPY RX CANCER: NORMAL
DATE 1ST CHEMO CANCER: NORMAL
DATE 1ST CHEMO CANCER: NORMAL
RAD ONC ARIA COURSE LAST TREATMENT DATE: NORMAL
RAD ONC ARIA COURSE LAST TREATMENT DATE: NORMAL
RAD ONC ARIA COURSE TREATMENT ELAPSED DAYS: NORMAL
RAD ONC ARIA COURSE TREATMENT ELAPSED DAYS: NORMAL
RAD ONC ARIA REFERENCE POINT DOSAGE GIVEN TO DATE: 20
RAD ONC ARIA REFERENCE POINT DOSAGE GIVEN TO DATE: 21.28
RAD ONC ARIA REFERENCE POINT DOSAGE GIVEN TO DATE: 46
RAD ONC ARIA REFERENCE POINT ID: NORMAL
RAD ONC ARIA REFERENCE POINT SESSION DOSAGE GIVEN: 2
RAD ONC ARIA REFERENCE POINT SESSION DOSAGE GIVEN: 20
RAD ONC ARIA REFERENCE POINT SESSION DOSAGE GIVEN: 21.28

## 2022-09-22 PROCEDURE — 77280 THER RAD SIMULAJ FIELD SMPL: CPT | Performed by: RADIOLOGY

## 2022-09-22 PROCEDURE — 77385 HCHG IMRT DELIVERY SIMPLE: CPT | Performed by: RADIOLOGY

## 2022-09-22 PROCEDURE — 77280 THER RAD SIMULAJ FIELD SMPL: CPT | Mod: 26 | Performed by: RADIOLOGY

## 2022-09-22 PROCEDURE — 77336 RADIATION PHYSICS CONSULT: CPT | Performed by: RADIOLOGY

## 2022-09-22 NOTE — PROCEDURES
DATE OF SERVICE: 9/22/2022    DIAGNOSIS:  Carcinoma of overlapping sites of left breast in female, estrogen receptor positive (HCC)  Staging form: Breast, AJCC 8th Edition  - Clinical: Stage IV (cT2, cN1, cM1, G3, ER+, [place]-, HER2+) - Signed by Raheel Arevalo M.D. on 7/7/2022  Histologic grading system: 3 grade system    Secondary carcinoma of bone (HCC)  Staging form: Bone - Appendicular Skeleton, Trunk, Skull, and Facial Bones, AJCC 8th Edition  - Clinical: cM1 - Signed by Raheel Arevalo M.D. on 7/11/2022       TREATMENT:  Radiation Therapy Episodes       Active Episodes       Radiation Therapy: SBRT    Radiation Therapy: SBRT    Radiation Therapy: 3D CRT                   Radiation Treatments         Plan Last Treated On Elapsed Days Fractions Treated Prescribed Fraction Dose (cGy) Prescribed Total Dose (cGy)    L_Supraclav [L SC DIBH] 9/22/2022 31 @ [id number] 23 of 25 200 5,000    L_Breast DIBH 9/22/2022 31 @ [id number] 23 of 25 200 5,000    SBRT T7 9/22/2022 0 @ [id number] 1 of 1 2,000 2,000                  Reference Point Last Treated On Elapsed Days Most Recent Session Dose (cGy) Total Dose (cGy)    Breast DPV 9/22/2022 31 @ [id number] 200 4,600    L Breast cp 9/22/2022 31 @ [id number] 200 4,600    L SC cp 9/22/2022 31 @ [id number] 200 4,600    SC DPV 9/22/2022 31 @ [id number] 200 4,600    SBRT T7 9/22/2022 0 @ [id number] 2,000 2,000                                    STEREOTACTIC PROCEDURE NOTE:    Called by [employer] machine to verify treatment parameters including:  treatment site, treatment dose, and treatment setup prior to stereotactic treatment..    Patient was placed in the treatment position with use of immobilization device and  laser guidance. CBCT images were acquired for target localization.  Images were reviewed in the axial, coronal, and saggital views and shifts were made as necessary to ensure that patient position matched simulation position.      Treatment delivered  per  prescription.  The medical physicist was present throughout the set-up, verification and treatment delivery to oversee the procedure and ensure all parameters agreed with the computerized plan.    I have personally reviewed the relevant data, performed the target localization, and determined all relevant factors for this patient’s simulation.

## 2022-09-23 ENCOUNTER — HOSPITAL ENCOUNTER (OUTPATIENT)
Dept: RADIATION ONCOLOGY | Facility: MEDICAL CENTER | Age: 57
End: 2022-09-23
Payer: COMMERCIAL

## 2022-09-23 LAB
CHEMOTHERAPY INFUSION START DATE: NORMAL
CHEMOTHERAPY INFUSION STOP DATE: NORMAL
CHEMOTHERAPY RECORDS: 2
CHEMOTHERAPY RECORDS: 2
CHEMOTHERAPY RECORDS: 5000
CHEMOTHERAPY RECORDS: 5000
CHEMOTHERAPY RECORDS: NORMAL
CHEMOTHERAPY RX CANCER: NORMAL
DATE 1ST CHEMO CANCER: NORMAL
RAD ONC ARIA COURSE LAST TREATMENT DATE: NORMAL
RAD ONC ARIA COURSE TREATMENT ELAPSED DAYS: NORMAL
RAD ONC ARIA REFERENCE POINT DOSAGE GIVEN TO DATE: 48
RAD ONC ARIA REFERENCE POINT ID: NORMAL
RAD ONC ARIA REFERENCE POINT SESSION DOSAGE GIVEN: 2

## 2022-09-23 PROCEDURE — 77412 RADIATION TX DELIVERY LVL 3: CPT | Performed by: RADIOLOGY

## 2022-09-26 ENCOUNTER — HOSPITAL ENCOUNTER (OUTPATIENT)
Dept: RADIATION ONCOLOGY | Facility: MEDICAL CENTER | Age: 57
End: 2022-09-26

## 2022-09-26 LAB
CHEMOTHERAPY INFUSION START DATE: NORMAL
CHEMOTHERAPY INFUSION STOP DATE: NORMAL
CHEMOTHERAPY RECORDS: 2
CHEMOTHERAPY RECORDS: 20
CHEMOTHERAPY RECORDS: 2000
CHEMOTHERAPY RECORDS: 5000
CHEMOTHERAPY RECORDS: NORMAL
CHEMOTHERAPY RX CANCER: NORMAL
DATE 1ST CHEMO CANCER: NORMAL
RAD ONC ARIA COURSE LAST TREATMENT DATE: NORMAL
RAD ONC ARIA COURSE TREATMENT ELAPSED DAYS: NORMAL
RAD ONC ARIA REFERENCE POINT DOSAGE GIVEN TO DATE: 20
RAD ONC ARIA REFERENCE POINT DOSAGE GIVEN TO DATE: 21.28
RAD ONC ARIA REFERENCE POINT DOSAGE GIVEN TO DATE: 50
RAD ONC ARIA REFERENCE POINT ID: NORMAL
RAD ONC ARIA REFERENCE POINT SESSION DOSAGE GIVEN: 2

## 2022-09-26 PROCEDURE — 77427 RADIATION TX MANAGEMENT X5: CPT | Performed by: RADIOLOGY

## 2022-09-26 PROCEDURE — 77412 RADIATION TX DELIVERY LVL 3: CPT | Performed by: RADIOLOGY

## 2022-09-27 ENCOUNTER — APPOINTMENT (OUTPATIENT)
Dept: PHYSICAL THERAPY | Facility: REHABILITATION | Age: 57
End: 2022-09-27
Attending: SURGERY
Payer: COMMERCIAL

## 2022-09-27 NOTE — RADIATION COMPLETION NOTES
END OF TREATMENT SUMMARY    Patient name:  Melva Sauceda    Primary Physician:  LISANDRO Forde MRN: [id number]  CSN: [id number]   Referring physician:   Dago Webb : 1965, 57 y.o.       TREATMENT SUMMARY:        Course First Treatment Date 2022    Course Last Treatment Date 2022   Course Elapsed Days 0 @ [id number]   Course Intent Curative     Radiation Therapy Episodes       Active Episodes       Radiation Therapy: SBRT    Radiation Therapy: SBRT    Radiation Therapy: 3D CRT                   Radiation Treatments         Plan Last Treated On Elapsed Days Fractions Treated Prescribed Fraction Dose (cGy) Prescribed Total Dose (cGy)    L SC DIBH 2022 35 @ [id number] 25 of 25 200 5,000    L_Breast DIBH 2022 35 @ [id number] 25 of 25 200 5,000    SBRT T7 2022 0 @ [id number] 1 of 1 2,000 2,000                  Reference Point Last Treated On Elapsed Days Most Recent Session Dose (cGy) Total Dose (cGy)    Breast DPV 2022 35 @ [id number] -- 5,000    SC DPV 2022 35 @ [id number] -- 5,000    SBRT T7 2022 0 @ [id number] -- 2,000                                     STAGE:   Carcinoma of overlapping sites of left breast in female, estrogen receptor positive (HCC)  Staging form: Breast, AJCC 8th Edition  - Clinical: Stage IV (cT2, cN1, cM1, G3, ER+, [place]-, HER2+) - Signed by Raheel Arevalo M.D. on 2022  Histologic grading system: 3 grade system    Secondary carcinoma of bone (HCC)  Staging form: Bone - Appendicular Skeleton, Trunk, Skull, and Facial Bones, AJCC 8th Edition  - Clinical: cM1 - Signed by Raheel Arevalo M.D. on 2022       TREATMENT INDICATION:   Locally advanced oligometastatic breast cancer     CONCURRENT SYSTEMIC TREATMENT:   Herceptin based therapy     RT COURSE DISCONTINUED EARLY:   No     PATIENT EXPERIENCE:   Toxicity Assessment 2022   Toxicity Assessment Breast Breast Breast  Breast Breast   Fatigue (lethargy, malaise, asthenia) None None None None None   Fever (in the absence of neutropenia) None None None None None   Radiation Dermatitis Faint erythema or dry desquamation None None None None   Lymphatics Normal Normal Normal Normal Normal   RT - Pain due to RT None None None None None   Dyspnea Normal Normal Normal Normal Normal        FOLLOW-UP PLAN:   6 Weeks     COMMENT:          ANATOMIC TARGET SUMMARY    ANATOMIC TARGET MODALITY TECHNIQUE   Left breast and supraclavicular fossa, T7   External beam, photons 3D Conformal            COMMENT:         DIAGRAMS:      DOSE VOLUME HISTOGRAMS:

## 2022-09-28 ENCOUNTER — PHYSICAL THERAPY (OUTPATIENT)
Dept: PHYSICAL THERAPY | Facility: REHABILITATION | Age: 57
End: 2022-09-28
Attending: SURGERY
Payer: COMMERCIAL

## 2022-09-28 DIAGNOSIS — L76.82 AXILLARY WEB SYNDROME: ICD-10-CM

## 2022-09-28 DIAGNOSIS — L90.5 AXILLARY WEB SYNDROME: ICD-10-CM

## 2022-09-28 PROCEDURE — 97140 MANUAL THERAPY 1/> REGIONS: CPT

## 2022-09-28 NOTE — OP THERAPY DAILY TREATMENT
"  Outpatient Physical Therapy  LYMPHEDEMA THERAPY DAILY TREATMENT     [facility] Physical Therapy [address].  [address]  [place] [address]  Phone:  [phone]  Fax:  [phone]    Date: 09/28/2022    Patient: Melva Sauceda  YOB: 1965  MRN: [id number]     Time Calculation    Start time: 1502  Stop time: 1547 Time Calculation (min): 45 minutes         Chief Complaint: Shoulder Problem    Visit #: 3    Subjective:   History of Present Illness:     Mechanism of injury:  Finished radiation Monday. L axilla irritated/painful: \"spicy\".  Has been performing gym exercises with some restriction, but not terrible. Pain to thumb and proximal humerus gone. Forearm bothersome.     Lymphedema Objective        Left Upper Extremity Circumferential Measurements 7/27  Other: cm  Areola: cm  Breast Crease: cm  Axilla: 32.3 cm  Upper Proximal Humerus: 31.2 cm  Distal Humerus: 27.9 cm  Elbow: 25.4 cm  Mid-Forearm: 24.6 cm  Wrist: 15.7 cm  Distal Bucio Crease: 18.9 cm  Total: 176 cm     Right Upper Extremity Circumferential Measurements 7/27  Other: cm  Areola: cm  Breast Crease: cm  Axilla: 33.1 cm  Upper Proximal Humerus: 32.3 cm  Distal Humerus: 29.5 cm  Elbow: 26 cm  Mid-Forearm: 26 cm  Wrist: 15.9 cm  Distal Bucio Crease: 19.2 cm  Total: cm 182    Therapeutic Exercises (CPT 84866):     1. Foam Roll Sequence, HEP    Therapeutic Treatments and Modalities:     1. Manual Therapy (CPT 50961)    Therapeutic Treatment and Modalities Summary: -cording techniques from elbow wrist on L UE. Hooking, tension, all light. Varying degrees of increasing abduction. Intermittent winding/unwinding for gentle increased stretch  Time-based treatments/modalities:    Physical Therapy Timed Treatment Charges  Manual therapy minutes (CPT 61660): 45 minutes      Assessment and Plan:   Assessment details:  Pt's discomfort proximally at L UE has resolved, therefore, focus today was on cords distal to elbow. Cording " appeared especially taut at anterior joint, but with improved mobility toward end of session. Melva will benefit from further intervention to reduce cord adhesions and improve her GHJ ROM.    Plan:  Therapy options:  Physical therapy treatment to continue  Discussed with:  Patient

## 2022-09-29 ENCOUNTER — APPOINTMENT (OUTPATIENT)
Dept: PHYSICAL THERAPY | Facility: REHABILITATION | Age: 57
End: 2022-09-29
Attending: SURGERY
Payer: COMMERCIAL

## 2022-10-04 ENCOUNTER — APPOINTMENT (OUTPATIENT)
Dept: PHYSICAL THERAPY | Facility: REHABILITATION | Age: 57
End: 2022-10-04
Attending: SURGERY
Payer: COMMERCIAL

## 2022-10-06 ENCOUNTER — PHYSICAL THERAPY (OUTPATIENT)
Dept: PHYSICAL THERAPY | Facility: REHABILITATION | Age: 57
End: 2022-10-06
Attending: SURGERY
Payer: COMMERCIAL

## 2022-10-06 DIAGNOSIS — L90.5 AXILLARY WEB SYNDROME: ICD-10-CM

## 2022-10-06 DIAGNOSIS — L76.82 AXILLARY WEB SYNDROME: ICD-10-CM

## 2022-10-06 PROCEDURE — 97140 MANUAL THERAPY 1/> REGIONS: CPT

## 2022-10-06 NOTE — OP THERAPY DAILY TREATMENT
Outpatient Physical Therapy  LYMPHEDEMA THERAPY DAILY TREATMENT     [facility] Physical Therapy [address].  [address]  [place] [address]  Phone:  [phone]  Fax:  [phone]    Date: 10/06/2022    Patient: Melva Sauceda  YOB: 1965  MRN: [id number]     Time Calculation    Start time: 1545  Stop time: 1629 Time Calculation (min): 44 minutes         Chief Complaint: Shoulder Problem    Visit #: 4    Subjective:   History of Present Illness:     Mechanism of injury:  Arm is doing better. Can reach out more.     Lymphedema Objective        Therapeutic Exercises (CPT 45545):     1. Foam Roll Sequence, HEP    Therapeutic Treatments and Modalities:     1. Manual Therapy (CPT 75026)    Therapeutic Treatment and Modalities Summary: -cording techniques from axilla to wrist on L UE. Hooking, tension, all light. Varying degrees of increasing abduction.   -discussed compression options and weartime  Time-based treatments/modalities:    Physical Therapy Timed Treatment Charges  Manual therapy minutes (CPT 73415): 44 minutes      Assessment and Plan:   Assessment details:  Melva has made improvements to her ROM and sensation of tautness during shoulder elevation. At this point, should she require an additional visit, she has been invited to return, however, she can likely progress with her own HEP.     Plan:  Therapy options:  Physical therapy treatment to continue  Discussed with:  Patient

## 2022-10-21 ENCOUNTER — HOSPITAL ENCOUNTER (OUTPATIENT)
Dept: CARDIOLOGY | Facility: MEDICAL CENTER | Age: 57
End: 2022-10-21
Attending: INTERNAL MEDICINE
Payer: COMMERCIAL

## 2022-10-21 DIAGNOSIS — C50.512 MALIGNANT NEOPLASM OF LOWER-OUTER QUADRANT OF LEFT FEMALE BREAST, UNSPECIFIED ESTROGEN RECEPTOR STATUS (HCC): ICD-10-CM

## 2022-10-21 LAB
LV EJECT FRACT  99904: 70
LV EJECT FRACT MOD 2C 99903: 75.37
LV EJECT FRACT MOD 4C 99902: 68.94
LV EJECT FRACT MOD BP 99901: 71.45

## 2022-10-21 PROCEDURE — 93306 TTE W/DOPPLER COMPLETE: CPT | Mod: 26 | Performed by: INTERNAL MEDICINE

## 2022-10-21 PROCEDURE — 93306 TTE W/DOPPLER COMPLETE: CPT

## 2022-11-07 ENCOUNTER — HOSPITAL ENCOUNTER (OUTPATIENT)
Dept: RADIATION ONCOLOGY | Facility: MEDICAL CENTER | Age: 57
End: 2022-11-30
Attending: RADIOLOGY
Payer: COMMERCIAL

## 2022-11-07 NOTE — PROGRESS NOTES
Telephone Appointment Visit   This telephone visit was initiated by the patient and they verbally consented.    Reason for Call:  Symptom Follow-up    HPI:    Oligometastatic/ Stage IV (T2N1M1) invasive ductal carcinoma of the left outer lower quadrant of breast ER positive CO negative HER2 positive with a Ki-67 of 26% with PET positive and MRI positive T7 vertebral metastasis receiving neoadjuvant TCH plus Perjeta followed by lumpectomy with lymph node dissection on 7/12/2022 on maintenance Herceptin, aromatase inhibitor, and Xgeva, completing comprehensive breast and lymphatic radiation to 5000 cGy and T7 radiosurgery to 2000 cGy in September 2022    Patient states after completing radiation she did have expected erythema in the radiation treatment area.  This was slightly more pronounced in the nipple areolar complex area.  She had more of a crusting appearance and slight inversion.  Likely this is now improving back towards baseline.  She still has some darkness in the axilla but this is faint in nature.  She did not have any symptoms she would attribute to her radiosurgery.  She continues on her maintenance phase of chemotherapy.  She remains very active without notable limitations.    Labs / Images Reviewed:   None    Assessment and Plan:     While patient continues to follow in medical oncology I will release her from active follow-up in radiation oncology.  Patient knows if she has any questions or concerns at any time she should feel free to contact me.    Follow-up: As needed    Total Time Spent (mins): 15    Raheel Arevalo M.D.

## 2022-11-15 ENCOUNTER — HOSPITAL ENCOUNTER (OUTPATIENT)
Dept: RADIOLOGY | Facility: MEDICAL CENTER | Age: 57
End: 2022-11-15
Attending: INTERNAL MEDICINE
Payer: COMMERCIAL

## 2022-11-15 DIAGNOSIS — C50.512 MALIGNANT NEOPLASM OF LOWER-OUTER QUADRANT OF LEFT FEMALE BREAST, UNSPECIFIED ESTROGEN RECEPTOR STATUS (HCC): ICD-10-CM

## 2022-11-15 PROCEDURE — 77080 DXA BONE DENSITY AXIAL: CPT

## 2022-11-22 ENCOUNTER — PHYSICAL THERAPY (OUTPATIENT)
Dept: PHYSICAL THERAPY | Facility: REHABILITATION | Age: 57
End: 2022-11-22
Attending: NURSE PRACTITIONER
Payer: COMMERCIAL

## 2022-11-22 DIAGNOSIS — I89.0 ACQUIRED LYMPHEDEMA: ICD-10-CM

## 2022-11-22 PROCEDURE — 97110 THERAPEUTIC EXERCISES: CPT

## 2022-11-22 PROCEDURE — 97161 PT EVAL LOW COMPLEX 20 MIN: CPT

## 2022-11-22 NOTE — OP THERAPY EVALUATION
Outpatient Physical Therapy  LYMPHEDEMA THERAPY INITIAL EVALUATION    [facility] Physical Therapy [address].  [address]  [place] [address]  Phone:  [phone]  Fax:  [phone]    Date of Evaluation: 11/22/2022    Patient: Melva Sauceda  YOB: 1965  MRN: [id number]     Referring Provider: Alonso Adams M.D.  [address]  [address]  [place] [address]   Referring Diagnosis Ankylosis, right foot [M24.674]     Time Calculation    Start time: 0901  Stop time: 0945 Time Calculation (min): 44 minutes             Chief Complaint: Lymphedema Aquired    Visit Diagnoses     ICD-10-CM   1. Acquired lymphedema  I89.0       Subjective:   History of Present Illness:     Mechanism of injury:  Pt reports her R foot/toes have been swollen since her foot surgery (bunionectomy and more) in Aug 2021.  Has toe cap but sometimes toes still get quite swollen even while wearing compression. She tends to get cramps to her R calf .    Toe ROM is poor. They're somewhat curled and cannot splay them. Has tightness throughout her arch.     By end of day, often toes have swollen to be twice the size. Feet are also quite painful.     Without compression, foot/toes immediately swell within an hour. Does reduce with extensive elevation.     Past Medical History:   Diagnosis Date    Cancer (HCC) 12/2021    Breast cancer - left    Cataract     H/O OU    COVID-19 06/11/2022    Detached retina, right     H/O    PONV (postoperative nausea and vomiting)     Prediabetes      Past Surgical History:   Procedure Laterality Date    PB MASTECTOMY, PARTIAL Left 7/12/2022    Procedure: LEFT ELIAN  LOCALIZED PARTIAL MASTECTOMY, LEFT SENTINEL NODE BIOPSY, POSSIBLE AXILLARY DISSECTION, BIOABSORBABLE FIDUCIAL MARKER PLACEMENT;  Surgeon: Shane Jorge M.D.;  Location: SURGERY [facility];  Service: General    NODE BIOPSY SENTINEL Left 7/12/2022    Procedure: BIOPSY, LYMPH NODE, SENTINEL;  Surgeon: Shane Jorge,  M.D.;  Location: SURGERY [facility];  Service: General    AXILLARY NODE DISSECTION Left 2022    Procedure: LYMPHADENECTOMY,  AXILLARY;  Surgeon: Shane Jorge M.D.;  Location: SURGERY [facility];  Service: General    MS ULTRASONIC GUIDANCE, INTRAOPERATIVE Right 2021    Procedure: ULTRASOUND GUIDANCE;  Surgeon: Shane Jorge M.D.;  Location: SURGERY [facility];  Service: General    CATH PLACEMENT Right 2021    Procedure: INSERTION, CATHETER - PORT A;  Surgeon: Shane Jorge M.D.;  Location: SURGERY [facility];  Service: General    RETINAL DETACHMENT REPAIR Right     BUNIONECTOMY Right     CATARACT EXTRACTION WITH IOL Left     GYN SURGERY           LUMPECTOMY Bilateral     >20 years    OTHER      Cataract Extraction OD     Social History     Tobacco Use    Smoking status: Never    Smokeless tobacco: Never   Substance Use Topics    Alcohol use: Yes     Comment: 1 drink per day     Family and Occupational History     Socioeconomic History    Marital status: Single     Spouse name: Not on file    Number of children: Not on file    Years of education: Not on file    Highest education level: Not on file   Occupational History    Not on file       Lymphedema Objective    Right Lower Extremity Circumferential Measurements  Waist: cm  Hip: cm  Scrotum: cm  Ground/Upper Thigh: cm  Mid Thigh: cm  Knee: cm  Upper Calf: cm  Mid Calf: cm  Ankle: 21.2 cm  Heel to Foot: 20.7 cm  Total: 41.9 cm    Other Notes  R   Toes  1 7.8  2 5.3  3 4.9  4  4.8  5 4.8    Great Toe: 20deg extension    Wrapped pt's R 1-4th toes with guaze. Applied toe cap over to evaluate for response to increased containment.           Therapeutic Exercises (CPT 56714):       Therapeutic Exercise Summary: HEP: toe curls with washcloth, great toe extension mobilization, great toe calf raise on washcloth    Time-based treatments/modalities:    Physical Therapy Timed Treatment Charges  Therapeutic exercise minutes (CPT 44350):  10 minutes      Assessment and Plan:   Functional Impairments: swelling    Assessment details:  Melva is a 56yo F known to this therapist from previous intervention, arriving today with report of R dorsal foot and toe swelling for the past 15 months. Given the chronicity of swelling, it is likely that Melva has developed lymphedema to the distal aspect of her R LE. In addition, fluid does also seem to be pooling due to limited toe ROM as well as limited ankle ROM. Melva will benefit from intervention to reduce swelling to toes and dorsum of foot as well as to improve ROM to toes and ankle to allow more normalized mechanics during walking and running.   Prognosis: fair      Goals:   Short Term Goals:  1. Pt will reduce toe circumference by .2cm to improve fluid removal from toes.   2. Pt will perform self-MLD to feet and toes to allow for lymphatic drainage from toes.   3. Pt will perform HEP to improve her toe and ankle ROM and allow for muscle pump to move fluid from distal LE.   Short term goal time span:  2-4 weeks    Long Term Goals:  1. Pt will reduce toe circumference by .4cm to improve fluid removal from toes.   2. Great toe will increase it's extension ROM by 10deg to allow for normalized toe-off during gait.   3. Pt will obtain compression garment for R foot/toes to prevent fluid accumulation while running/activity.   Long term goal time span:  4-6 weeks    Plan:  Therapy options:  Physical therapy treatment to continue  Planned therapy interventions:  Compression bandaging, compression stocking, decongestive exercises, home exercise program, intermittent compression, manual lymph drainage, myofascial release techniques, orthotic measurements/fitting, patient education, postural exercises, range of motion exercises, self-care/training (CPT 04502), skin/wound care, soft tissue manual techniques (CPT 16122), strengthening exercises and Velcro wraps  Planned education:  Functional anatomy and physiology of the  lymphatic system, pathophysiology of lymphedema, lymphedema exercise, lymphedema precautions, proper skin care/nutrition, compression bandaging, self massage, infection prevention, scar tissue management, activity guidelines, dietary guidelines, skin care guidelines, home pump use, bandage removal and long term self-management of lymphedema  Frequency:  1x week  Duration in weeks:  8  Discussed with:  Patient    Functional Assessment Used    LLIS    Referring provider co-signature:  I have reviewed this plan of care and my co-signature certifies the need for services.    Certification Period: 11/22/2022 to  01/17/23    Physician Signature: ________________________________ Date: ______________

## 2022-11-30 ENCOUNTER — PHYSICAL THERAPY (OUTPATIENT)
Dept: PHYSICAL THERAPY | Facility: REHABILITATION | Age: 57
End: 2022-11-30
Attending: ORTHOPAEDIC SURGERY
Payer: COMMERCIAL

## 2022-11-30 DIAGNOSIS — I89.0 ACQUIRED LYMPHEDEMA: ICD-10-CM

## 2022-11-30 PROCEDURE — 97140 MANUAL THERAPY 1/> REGIONS: CPT

## 2022-11-30 PROCEDURE — 97110 THERAPEUTIC EXERCISES: CPT

## 2022-11-30 NOTE — OP THERAPY DAILY TREATMENT
Outpatient Physical Therapy  LYMPHEDEMA THERAPY DAILY TREATMENT     [facility] Physical Therapy [address]  [address]  [place] [address]  Phone:  [phone]  Fax:  [phone]    Date: 11/30/2022    Patient: Melva Sauceda  YOB: 1965  MRN: [id number]     Time Calculation    Start time: 1416  Stop time: 1501 Time Calculation (min): 45 minutes         Chief Complaint: Foot Swelling    Visit #: 6    Subjective:   History of Present Illness:     Mechanism of injury:  Did a 10k last week. Toes have been somewhat bizarre regarding their swelling. Some days, blow up, other days, seem controlled.     Lymphedema Objective        Therapeutic Exercises (CPT 15723):     1. Air ex Calf Raises, B x20    2. Air ex lunge weight shift, R LE x20    3. Air ex whirly bird, R LE x20    4. Ankle Alphabet with blue TB, 1 set of alphabet      Therapeutic Exercise Summary: HEP: toe curls with washcloth, great toe extension mobilization, great toe calf raise on washcloth    Added: Ankle Alphabet with blue TB    Therapeutic Treatments and Modalities:     1. Manual Therapy (CPT 15351)    Therapeutic Treatment and Modalities Summary: MLD to R foot/toes without trunk involvement. Focus on R foot/toes.   The purpose of Manual Lymph Drainage (MLD) is to reduce lymph volume in the affected limb by increasing intake of lymphatic load into the lymphatic system, increasing the volume of transported lymph fluid, moving lymph fluid in superficial lymph vessels to collateral lymph collectors, anastomoses, or tissue channels, and increasing venous return. The goal of MLD is to re-route the lymph flow around blocked areas into more centrally located healthy lymph vessels, which drain into the venous system.      Wrapped R toes and forefoot in guaze to create increased compression.   Time-based treatments/modalities:    Physical Therapy Timed Treatment Charges  Manual therapy minutes (CPT 08033): 15 minutes  Therapeutic  exercise minutes (CPT 03237): 30 minutes      Assessment and Plan:   Assessment details:  Unfortunately, it is likely that Melva is going to require a custom R toe/foot garment to control her swelling. Her current garment is not allowing for adequate containment, especially given her activity level. Anticipate measuring for this next time.     Plan:  Therapy options:  Physical therapy treatment to continue  Discussed with:  Patient

## 2022-12-07 ENCOUNTER — PHYSICAL THERAPY (OUTPATIENT)
Dept: PHYSICAL THERAPY | Facility: REHABILITATION | Age: 57
End: 2022-12-07
Attending: SURGERY
Payer: COMMERCIAL

## 2022-12-07 DIAGNOSIS — I89.0 ACQUIRED LYMPHEDEMA: ICD-10-CM

## 2022-12-07 PROCEDURE — 97535 SELF CARE MNGMENT TRAINING: CPT

## 2022-12-07 NOTE — OP THERAPY DAILY TREATMENT
Outpatient Physical Therapy  LYMPHEDEMA THERAPY DAILY TREATMENT     [facility] Physical Therapy [address]  [address]  [place] [address]  Phone:  [phone]  Fax:  [phone]    Date: 12/07/2022    Patient: Melva Sauceda  YOB: 1965  MRN: [id number]     Time Calculation    Start time: 1630  Stop time: 1712 Time Calculation (min): 42 minutes         Chief Complaint: Foot Swelling    Visit #: 7    Subjective:   History of Present Illness:     Mechanism of injury:  Foot doing ok. Has been wrapping toes underneath toe cap    Lymphedema Objective        Therapeutic Treatments and Modalities:     1. Self Care ADL Training (CPT 20499)    Therapeutic Treatment and Modalities Summary: Measured pt's foot for custom toe cap.   Time-based treatments/modalities:    Physical Therapy Timed Treatment Charges  Functional training, self care minutes (CPT 02447): 15 minutes      Assessment and Plan:   Assessment details:  Melva is controlling her swelling with the combination of wrapping and toe cap. Today, measured her for custom to allow for reduced quantity of necessary garments. Will focus on intrinsic foot stabilization next session.    Plan:  Therapy options:  Physical therapy treatment to continue  Discussed with:  Patient

## 2022-12-14 ENCOUNTER — PHYSICAL THERAPY (OUTPATIENT)
Dept: PHYSICAL THERAPY | Facility: REHABILITATION | Age: 57
End: 2022-12-14
Attending: SURGERY
Payer: COMMERCIAL

## 2022-12-14 DIAGNOSIS — I89.0 ACQUIRED LYMPHEDEMA: ICD-10-CM

## 2022-12-14 PROCEDURE — 97140 MANUAL THERAPY 1/> REGIONS: CPT

## 2022-12-14 PROCEDURE — 97110 THERAPEUTIC EXERCISES: CPT

## 2022-12-14 NOTE — OP THERAPY DAILY TREATMENT
"  Outpatient Physical Therapy  DAILY TREATMENT     [facility] Physical [address].  [address]  [place] [address]  Phone:  [phone]  Fax:  [phone]    Date: 12/14/2022    Patient: Melva Sauceda  YOB: 1965  MRN: [id number]     Time Calculation    Start time: 1631  Stop time: 1712 Time Calculation (min): 41 minutes         Chief Complaint: Foot Swelling and Foot Problem    Visit #: 8    SUBJECTIVE:  Doing ok. Custom toe cap should arrive soon.     OBJECTIVE:  Current objective measures:           Therapeutic Exercises (CPT 74000):     1. Air ex Calf Raises, 0    2. Air ex lunge weight shift, 0    3. Air ex whirly bird, 0    4. Ankle Alphabet with blue TB, HEP    6. Single Leg Clock, 1x ea B, Cues for slow, controlled motion    7. 2\" eccentric step down, x15 B, unsteady with excessive arm swing    8. Reverse Plank with leg lift, x8 B, pt required break partway through set    9. Side Plank with leg lift, unable    10. clam with pink TB, x10B, poor control      Therapeutic Exercise Summary: HEP: toe curls with washcloth, great toe extension mobilization, great toe calf raise on washcloth, Ankle Alphabet with blue TB    Added: reverse plank with leg lift, single leg clock    Therapeutic Treatments and Modalities:     1. Manual Therapy (CPT 86724)    Therapeutic Treatment and Modalities Summary: MLD to R foot/toes without trunk involvement. Focus on R foot/toes.   The purpose of Manual Lymph Drainage (MLD) is to reduce lymph volume in the affected limb by increasing intake of lymphatic load into the lymphatic system, increasing the volume of transported lymph fluid, moving lymph fluid in superficial lymph vessels to collateral lymph collectors, anastomoses, or tissue channels, and increasing venous return. The goal of MLD is to re-route the lymph flow around blocked areas into more centrally located healthy lymph vessels, which drain into the venous system.      Wrapped R toes " and forefoot in josephineazneal to create increased compression.   Time-based treatments/modalities:    Physical Therapy Timed Treatment Charges  Manual therapy minutes (CPT 27351): 10 minutes  Therapeutic exercise minutes (CPT 03372): 31 minutes        ASSESSMENT:   Response to treatment: Melva demonstrates poor hip stability during single leg eccentric motion which could be contributing to her difficulty during rock hopping and pain during running. Her toes today appeared visually swollen despite use of OTC toe cap and athletic sock. She will benefit from further intervention to stabilize her B LE to prevent further discomfort and potential injury.     PLAN/RECOMMENDATIONS:   Plan for treatment: therapy treatment to continue next visit.  Planned interventions for next visit: continue with current treatment.

## 2022-12-21 ENCOUNTER — PHYSICAL THERAPY (OUTPATIENT)
Dept: PHYSICAL THERAPY | Facility: REHABILITATION | Age: 57
End: 2022-12-21
Attending: SURGERY
Payer: COMMERCIAL

## 2022-12-21 DIAGNOSIS — I89.0 ACQUIRED LYMPHEDEMA: ICD-10-CM

## 2022-12-21 PROCEDURE — 97535 SELF CARE MNGMENT TRAINING: CPT

## 2022-12-21 PROCEDURE — 97110 THERAPEUTIC EXERCISES: CPT

## 2022-12-21 NOTE — OP THERAPY DAILY TREATMENT
"  Outpatient Physical Therapy  DAILY TREATMENT     [facility] Physical [address]  [address]  [place] [address]  Phone:  [phone]  Fax:  [phone]    Date: 12/21/2022    Patient: Melva Sauceda  YOB: 1965  MRN: [id number]     Time Calculation    Start time: 0730  Stop time: 0810 Time Calculation (min): 40 minutes         Chief Complaint: Foot Swelling    Visit #: 9    SUBJECTIVE:  Received toe cap. Is wearing it. But donned it this morning. Was able to run and exercise more often. Foot was quite achy and she transitioned to weights.     OBJECTIVE:  Current objective measures:           Therapeutic Exercises (CPT 67376):     1. Air ex Calf Raises, 0    2. Air ex lunge weight shift, 0    3. Air ex whirly bird, 0    4. Ankle Alphabet with blue TB, 0    6. Single Leg Clock, All numbers, bilaterally    7. 2\" eccentric step down, x10 B    8. Reverse Plank with leg lift, HEP    9. Side Plank with leg lift, 0    10. clam, x10 B    11. gastroc stretch, x30sec B    12. wall hip abduction with ball, x10 ea, utilized mirror for feedback    13. HS stretch, 30sec ea    14. MTP extension heel lift, x10 B, cues for slow lowering    15. Squat with focus on heel contact, x10 B      Therapeutic Exercise Summary: HEP: toe curls with washcloth, great toe extension mobilization, great toe calf raise on washcloth, Ankle Alphabet with blue TB, reverse plank with leg lift, single leg clock    Added: clams without TB    Therapeutic Treatments and Modalities:     1. Self Care ADL Training (CPT 11706)    Therapeutic Treatment and Modalities Summary: Evaluated toe cap: well-fitting. Toe length on some toes may require adjustment. Pt has been advised on wearing as well as multiple washes for appropriate fitting.   Time-based treatments/modalities:    Physical Therapy Timed Treatment Charges  Functional training, self care minutes (CPT 73854): 10 minutes  Therapeutic exercise minutes (CPT 77197): 30 " minutes        ASSESSMENT:   Response to treatment: Melva now has her toe cap which will hopefully allow for control of swelling at the base of her foot and all her toes. Her single leg stability during loading is fair and will require further intervention to improve her ability to remain stable during mobility.     PLAN/RECOMMENDATIONS:   Plan for treatment: therapy treatment to continue next visit.  Planned interventions for next visit: continue with current treatment.

## 2022-12-28 ENCOUNTER — PHYSICAL THERAPY (OUTPATIENT)
Dept: PHYSICAL THERAPY | Facility: REHABILITATION | Age: 57
End: 2022-12-28
Attending: SURGERY
Payer: COMMERCIAL

## 2022-12-28 DIAGNOSIS — I89.0 ACQUIRED LYMPHEDEMA: ICD-10-CM

## 2022-12-28 PROCEDURE — 97110 THERAPEUTIC EXERCISES: CPT

## 2022-12-28 NOTE — OP THERAPY DAILY TREATMENT
"  Outpatient Physical Therapy  DAILY TREATMENT     [facility] Physical Therapy [address].  [address]  [place] [address]  Phone:  [phone]  Fax:  [phone]    Date: 12/28/2022    Patient: Melva Sauceda  YOB: 1965  MRN: [id number]     Time Calculation    Start time: 0730  Stop time: 0802 (pt had to leave early for appt across town) Time Calculation (min): 32 minutes         Chief Complaint: Foot Swelling    Visit #: 10    SUBJECTIVE:  Pinky toe swelling while wearing foot cap.    OBJECTIVE:  Current objective measures:           Therapeutic Exercises (CPT 68309):     1. Air ex Calf Raises, 0    2. Air ex lunge weight shift, 0    3. Air ex whirly bird, 0    4. Ankle Alphabet with blue TB, 0    6. Single Leg Clock, HEP    7. 2\" eccentric step down, 0    8. Reverse Plank with leg lift, 0    9. Side Plank with leg lift, 0    10. clam, x10    11. gastroc stretch, x30sec B    12. wall hip abduction with ball, 2x5 with mirror    14. MTP extension heel lift, 0    15. Squat with focus on heel contact, x10    16. MTP extension lunge with ball toss, x20 tosses B    17. narrow EKTA on air ex ball toss, x5, difficulty achieving static stability      Therapeutic Exercise Summary: HEP: toe curls with washcloth, great toe extension mobilization, great toe calf raise on washcloth, Ankle Alphabet with blue TB, reverse plank with leg lift, single leg clock, clams without TB    Added lunge with narrow EKTA with UE motion        Time-based treatments/modalities:    Physical Therapy Timed Treatment Charges  Therapeutic exercise minutes (CPT 68523): 32 minutes          ASSESSMENT:   Response to treatment: Melva's stability appears  limited with narrow EKTA/change in surface type likely due to poor ankle and great toe flexion ROM. She will benefit from further intervention to improve her ROM and strengthen surrounding musculature.     PLAN/RECOMMENDATIONS:   Plan for treatment: therapy treatment to " continue next visit.  Planned interventions for next visit: continue with current treatment.

## 2023-01-12 ENCOUNTER — PHYSICAL THERAPY (OUTPATIENT)
Dept: PHYSICAL THERAPY | Facility: REHABILITATION | Age: 58
End: 2023-01-12
Attending: SURGERY
Payer: COMMERCIAL

## 2023-01-12 ENCOUNTER — HOSPITAL ENCOUNTER (OUTPATIENT)
Dept: RADIOLOGY | Facility: MEDICAL CENTER | Age: 58
End: 2023-01-12
Attending: INTERNAL MEDICINE

## 2023-01-12 DIAGNOSIS — I89.0 ACQUIRED LYMPHEDEMA: ICD-10-CM

## 2023-01-12 PROCEDURE — 97535 SELF CARE MNGMENT TRAINING: CPT

## 2023-01-12 NOTE — OP THERAPY DAILY TREATMENT
"  Outpatient Physical Therapy  LYMPHEDEMA THERAPY DAILY TREATMENT     [facility] Physical [address].  [address]  [place] [address]  Phone:  [phone]  Fax:  [phone]    Date: 01/12/2023    Patient: Melva Sauceda  YOB: 1965  MRN: [id number]     Time Calculation    Start time: 0730  Stop time: 0810 Time Calculation (min): 40 minutes         Chief Complaint: Lymphedema Aquired    Visit #: 7    Subjective:   History of Present Illness:     Mechanism of injury:  Has been wearing toe cap with success but swelling to 5th toe. Foot feels more flexible and has been moving on softer ground.     Lymphedema Objective      Other Notes  Great toe extension to 40deg        Therapeutic Exercises (CPT 44012):     1. Air ex Calf Raises    2. Air ex lunge weight shift    3. Air ex whirly bird    4. Ankle Alphabet with blue TB    6. Single Leg Clock    7. 2\" eccentric step down    8. Reverse Plank with leg lift    9. Side Plank with leg lift    10. clam    11. gastroc stretch    12. wall hip abduction with ball    14. MTP extension heel lift    15. Squat with focus on heel contact    16. MTP extension lunge with ball toss    17. narrow EKTA on air ex ball toss      Therapeutic Exercise Summary: HEP: toe curls with washcloth, great toe extension mobilization, great toe calf raise on washcloth, Ankle Alphabet with blue TB, reverse plank with leg lift, single leg clock, clams without TB, lunge with narrow EKTA with UE motion          Therapeutic Treatments and Modalities:     1. Self Care ADL Training (CPT 59234)    Therapeutic Treatment and Modalities Summary: -Re-measured pt's R foot for toe-cap.   -Discussed continuing to introduce softer surfaces to create flexibility and strengthening to intrinsic foot muscles.   Time-based treatments/modalities:    Physical Therapy Timed Treatment Charges  Functional training, self care minutes (CPT 14707): 28 minutes      Assessment and Plan: "   Assessment details:  Melva's first four toes are well controlled with toe cap. Unfortunately, she will require compression to 5th toe. Her great toe extension ROM has significantly improved as well. Once she has received her new toe cap she can likely be DC'd to home program.     Plan:  Therapy options:  Physical therapy treatment to continue  Discussed with:  Patient

## 2023-01-13 NOTE — OP THERAPY PROGRESS SUMMARY
Outpatient Physical Therapy  PROGRESS SUMMARY NOTE      [facility] Physical Therapy [address]  [address]  [place] [address]  Phone:  [phone]  Fax:  [phone]    Date of Visit: 01/12/2023    Patient: Melva Sauceda  YOB: 1965  MRN: [id number]     Referring Provider: Alonso Adams M.D.  [address]  [address]  [place] [address]   Referring Diagnosis Malignant neoplasm of nipple and areola, left female breast [C50.012]     Visit Diagnoses     ICD-10-CM   1. Acquired lymphedema  I89.0       Rehab Potential: good    Progress Report Period: 11/22/22-1/12/23          Objective Findings and Assessment:   Patient progression towards goals: Melva has been seen for seven visits of therapy to address her R foot and toe swelling as well as her limited toe and ankle ROM. She has been wearing a custom toe cap beneath an athletic compression sock with success. Toe cap will need adjustment. Her great toe extension has improved to 40degrees and she is feeling more confident on surfaces that are not flat and firm. Anticipate Melva requiring 1-2 additional visits to ensure toe cap is effective.     Goals:   Short Term Goals:   1. Pt will reduce toe circumference by .2cm to improve fluid removal from toes. Goal Met  2. Pt will perform self-MLD to feet and toes to allow for lymphatic drainage from toes. Goal Met  3. Pt will perform HEP to improve her toe and ankle ROM and allow for muscle pump to move fluid from distal LE. Goal Met  Short term goal time span:  2-4 weeks      Long Term Goals:    1. Pt will reduce toe circumference by .4cm to improve fluid removal from toes. Goal Not Met  2. Great toe will increase it's extension ROM by 10deg to allow for normalized toe-off during gait. Goal Met  3. Pt will obtain compression garment for R foot/toes to prevent fluid accumulation while running/activity. Goal Partially met  Long term goal time span:  4-6 weeks    Plan:   Planned  therapy interventions:  Manual Therapy (CPT 45641), Therapeutic Activities (CPT 01175), Therapeutic Exercise (CPT 53947) and Functional Training, Self Care (CPT 13490)  Frequency:  1x week  Duration in weeks:  8    Referring provider co-signature:  I have reviewed this plan of care and my co-signature certifies the need for services.     Certification Period: 01/12/2023 to 03/09/23    Physician Signature: ________________________________ Date: ______________

## 2023-01-27 ENCOUNTER — HOSPITAL ENCOUNTER (OUTPATIENT)
Dept: CARDIOLOGY | Facility: MEDICAL CENTER | Age: 58
End: 2023-01-27
Attending: INTERNAL MEDICINE
Payer: COMMERCIAL

## 2023-01-27 DIAGNOSIS — C50.512 MALIGNANT NEOPLASM OF LOWER-OUTER QUADRANT OF LEFT FEMALE BREAST, UNSPECIFIED ESTROGEN RECEPTOR STATUS (HCC): ICD-10-CM

## 2023-01-27 LAB
LV EJECT FRACT  99904: 65
LV EJECT FRACT MOD 2C 99903: 60.54
LV EJECT FRACT MOD 4C 99902: 66.28
LV EJECT FRACT MOD BP 99901: 63.97

## 2023-01-27 PROCEDURE — 93306 TTE W/DOPPLER COMPLETE: CPT

## 2023-01-27 PROCEDURE — 93306 TTE W/DOPPLER COMPLETE: CPT | Mod: 26 | Performed by: INTERNAL MEDICINE

## 2023-01-30 ENCOUNTER — HOSPITAL ENCOUNTER (OUTPATIENT)
Facility: MEDICAL CENTER | Age: 58
End: 2023-01-30
Attending: NURSE PRACTITIONER
Payer: COMMERCIAL

## 2023-01-30 PROCEDURE — 80053 COMPREHEN METABOLIC PANEL: CPT

## 2023-01-31 LAB
ALBUMIN SERPL BCP-MCNC: 4.5 G/DL (ref 3.2–4.9)
ALBUMIN/GLOB SERPL: 2.3 G/DL
ALP SERPL-CCNC: 50 U/L (ref 30–99)
ALT SERPL-CCNC: 16 U/L (ref 2–50)
ANION GAP SERPL CALC-SCNC: 10 MMOL/L (ref 7–16)
AST SERPL-CCNC: 24 U/L (ref 12–45)
BILIRUB SERPL-MCNC: 0.3 MG/DL (ref 0.1–1.5)
BUN SERPL-MCNC: 16 MG/DL (ref 8–22)
CALCIUM ALBUM COR SERPL-MCNC: 8.9 MG/DL (ref 8.5–10.5)
CALCIUM SERPL-MCNC: 9.3 MG/DL (ref 8.5–10.5)
CHLORIDE SERPL-SCNC: 107 MMOL/L (ref 96–112)
CO2 SERPL-SCNC: 25 MMOL/L (ref 20–33)
CREAT SERPL-MCNC: 0.56 MG/DL (ref 0.5–1.4)
GFR SERPLBLD CREATININE-BSD FMLA CKD-EPI: 105 ML/MIN/1.73 M 2
GLOBULIN SER CALC-MCNC: 2 G/DL (ref 1.9–3.5)
GLUCOSE SERPL-MCNC: 90 MG/DL (ref 65–99)
POTASSIUM SERPL-SCNC: 4.5 MMOL/L (ref 3.6–5.5)
PROT SERPL-MCNC: 6.5 G/DL (ref 6–8.2)
SODIUM SERPL-SCNC: 142 MMOL/L (ref 135–145)

## 2023-03-05 ENCOUNTER — PATIENT MESSAGE (OUTPATIENT)
Dept: CARDIOLOGY | Facility: MEDICAL CENTER | Age: 58
End: 2023-03-05
Payer: COMMERCIAL

## 2023-03-13 ENCOUNTER — HOSPITAL ENCOUNTER (OUTPATIENT)
Facility: MEDICAL CENTER | Age: 58
End: 2023-03-13
Attending: NURSE PRACTITIONER
Payer: COMMERCIAL

## 2023-03-13 PROCEDURE — 80053 COMPREHEN METABOLIC PANEL: CPT

## 2023-03-14 LAB
ALBUMIN SERPL BCP-MCNC: 4 G/DL (ref 3.2–4.9)
ALBUMIN/GLOB SERPL: 2.2 G/DL
ALP SERPL-CCNC: 54 U/L (ref 30–99)
ALT SERPL-CCNC: 14 U/L (ref 2–50)
ANION GAP SERPL CALC-SCNC: 11 MMOL/L (ref 7–16)
AST SERPL-CCNC: 22 U/L (ref 12–45)
BILIRUB SERPL-MCNC: 0.3 MG/DL (ref 0.1–1.5)
BUN SERPL-MCNC: 21 MG/DL (ref 8–22)
CALCIUM ALBUM COR SERPL-MCNC: 9.1 MG/DL (ref 8.5–10.5)
CALCIUM SERPL-MCNC: 9.1 MG/DL (ref 8.5–10.5)
CHLORIDE SERPL-SCNC: 109 MMOL/L (ref 96–112)
CO2 SERPL-SCNC: 24 MMOL/L (ref 20–33)
CREAT SERPL-MCNC: 0.6 MG/DL (ref 0.5–1.4)
GFR SERPLBLD CREATININE-BSD FMLA CKD-EPI: 104 ML/MIN/1.73 M 2
GLOBULIN SER CALC-MCNC: 1.8 G/DL (ref 1.9–3.5)
GLUCOSE SERPL-MCNC: 117 MG/DL (ref 65–99)
POTASSIUM SERPL-SCNC: 4.1 MMOL/L (ref 3.6–5.5)
PROT SERPL-MCNC: 5.8 G/DL (ref 6–8.2)
SODIUM SERPL-SCNC: 144 MMOL/L (ref 135–145)

## 2023-04-26 ENCOUNTER — APPOINTMENT (OUTPATIENT)
Dept: PHYSICAL THERAPY | Facility: REHABILITATION | Age: 58
End: 2023-04-26
Attending: SURGERY
Payer: COMMERCIAL

## 2023-05-01 ENCOUNTER — HOSPITAL ENCOUNTER (OUTPATIENT)
Facility: MEDICAL CENTER | Age: 58
End: 2023-05-01
Attending: NURSE PRACTITIONER
Payer: COMMERCIAL

## 2023-05-01 PROCEDURE — 80053 COMPREHEN METABOLIC PANEL: CPT

## 2023-05-02 ENCOUNTER — HOSPITAL ENCOUNTER (OUTPATIENT)
Dept: RADIOLOGY | Facility: MEDICAL CENTER | Age: 58
End: 2023-05-02
Attending: NURSE PRACTITIONER
Payer: COMMERCIAL

## 2023-05-02 ENCOUNTER — HOSPITAL ENCOUNTER (OUTPATIENT)
Dept: RADIOLOGY | Facility: MEDICAL CENTER | Age: 58
End: 2023-05-02
Attending: INTERNAL MEDICINE
Payer: COMMERCIAL

## 2023-05-02 DIAGNOSIS — R23.9 UNSPECIFIED SKIN CHANGES: ICD-10-CM

## 2023-05-02 DIAGNOSIS — C50.512 MALIGNANT NEOPLASM OF LOWER-OUTER QUADRANT OF LEFT FEMALE BREAST, UNSPECIFIED ESTROGEN RECEPTOR STATUS (HCC): ICD-10-CM

## 2023-05-02 LAB
ALBUMIN SERPL BCP-MCNC: 4.7 G/DL (ref 3.2–4.9)
ALBUMIN/GLOB SERPL: 2.2 G/DL
ALP SERPL-CCNC: 61 U/L (ref 30–99)
ALT SERPL-CCNC: 15 U/L (ref 2–50)
ANION GAP SERPL CALC-SCNC: 12 MMOL/L (ref 7–16)
AST SERPL-CCNC: 21 U/L (ref 12–45)
BILIRUB SERPL-MCNC: 0.2 MG/DL (ref 0.1–1.5)
BUN SERPL-MCNC: 28 MG/DL (ref 8–22)
CALCIUM ALBUM COR SERPL-MCNC: 8.8 MG/DL (ref 8.5–10.5)
CALCIUM SERPL-MCNC: 9.4 MG/DL (ref 8.5–10.5)
CHLORIDE SERPL-SCNC: 103 MMOL/L (ref 96–112)
CO2 SERPL-SCNC: 25 MMOL/L (ref 20–33)
CREAT SERPL-MCNC: 0.62 MG/DL (ref 0.5–1.4)
GFR SERPLBLD CREATININE-BSD FMLA CKD-EPI: 103 ML/MIN/1.73 M 2
GLOBULIN SER CALC-MCNC: 2.1 G/DL (ref 1.9–3.5)
GLUCOSE SERPL-MCNC: 97 MG/DL (ref 65–99)
POTASSIUM SERPL-SCNC: 4.9 MMOL/L (ref 3.6–5.5)
PROT SERPL-MCNC: 6.8 G/DL (ref 6–8.2)
SODIUM SERPL-SCNC: 140 MMOL/L (ref 135–145)

## 2023-05-02 PROCEDURE — 76882 US LMTD JT/FCL EVL NVASC XTR: CPT | Mod: LT

## 2023-05-02 PROCEDURE — 73502 X-RAY EXAM HIP UNI 2-3 VIEWS: CPT | Mod: LT

## 2023-05-02 PROCEDURE — 73560 X-RAY EXAM OF KNEE 1 OR 2: CPT | Mod: LT

## 2023-05-02 PROCEDURE — 73552 X-RAY EXAM OF FEMUR 2/>: CPT | Mod: LT

## 2023-05-18 ENCOUNTER — PHYSICAL THERAPY (OUTPATIENT)
Dept: PHYSICAL THERAPY | Facility: REHABILITATION | Age: 58
End: 2023-05-18
Attending: INTERNAL MEDICINE
Payer: COMMERCIAL

## 2023-05-18 DIAGNOSIS — I89.0 LYMPHEDEMA: ICD-10-CM

## 2023-05-18 PROCEDURE — 97110 THERAPEUTIC EXERCISES: CPT

## 2023-05-18 PROCEDURE — 97162 PT EVAL MOD COMPLEX 30 MIN: CPT

## 2023-05-18 NOTE — OP THERAPY EVALUATION
"  Outpatient Physical Therapy  LYMPHEDEMA THERAPY INITIAL EVALUATION    [facility] Physical Therapy [address]  [address]  [place] [address]  Phone:  [phone]  Fax:  [phone]    Date of Evaluation: 05/18/2023    Patient: Melva Sauceda  YOB: 1965  MRN: [id number]     Referring Provider: [facility]  [facility] [address] [place] [address]   Referring Diagnosis Malignant neoplasm of lower-outer quadrant of left female breast [C50.512];Lymphedema, not elsewhere classified [I89.0]     Time Calculation    Start time: 0730  Stop time: 0816 Time Calculation (min): 46 minutes             Chief Complaint: Shoulder Problem and Lymphedema Breast Cancer    Visit Diagnoses     ICD-10-CM   1. Lymphedema  I89.0       Subjective:   History of Present Illness:     Mechanism of injury:  Melva reports she had her radiation marker removed from her L breast. She is having less pain to her L breast now. However, if she touches her inferior breast it will hurt a lot and feels as though it balloons up. Her L breast feels tender and she has not been able to do her chest exercises (lat pull down, bench press). Her L breast feels \"puffy\" to her and she is concerned but does not really know what is going on.       Past Medical History:   Diagnosis Date    Cancer (HCC) 12/2021    Breast cancer - left    Cataract     H/O OU    COVID-19 06/11/2022    Detached retina, right     H/O    PONV (postoperative nausea and vomiting)     Prediabetes      Past Surgical History:   Procedure Laterality Date    PB MASTECTOMY, PARTIAL Left 7/12/2022    Procedure: LEFT ELIAN  LOCALIZED PARTIAL MASTECTOMY, LEFT SENTINEL NODE BIOPSY, POSSIBLE AXILLARY DISSECTION, BIOABSORBABLE FIDUCIAL MARKER PLACEMENT;  Surgeon: Shane Jorge M.D.;  Location: SURGERY [facility];  Service: General    NODE BIOPSY SENTINEL Left 7/12/2022    Procedure: BIOPSY, LYMPH NODE, SENTINEL;  Surgeon: Shane Jorge M.D.;  Location: " SURGERY [facility];  Service: General    AXILLARY NODE DISSECTION Left 2022    Procedure: LYMPHADENECTOMY,  AXILLARY;  Surgeon: Shane Jorge M.D.;  Location: SURGERY [facility];  Service: General    MS ULTRASONIC GUIDANCE, INTRAOPERATIVE Right 2021    Procedure: ULTRASOUND GUIDANCE;  Surgeon: Shane Jorge M.D.;  Location: SURGERY [facility];  Service: General    CATH PLACEMENT Right 2021    Procedure: INSERTION, CATHETER - PORT A;  Surgeon: Shane Jorge M.D.;  Location: SURGERY [facility];  Service: General    RETINAL DETACHMENT REPAIR Right     BUNIONECTOMY Right     CATARACT EXTRACTION WITH IOL Left     GYN SURGERY           LUMPECTOMY Bilateral     >20 years    OTHER      Cataract Extraction OD     Social History     Tobacco Use    Smoking status: Never    Smokeless tobacco: Never   Vaping Use    Vaping Use: Never used   Substance Use Topics    Alcohol use: Yes     Comment: 1 drink per day     Family and Occupational History     Socioeconomic History    Marital status: Single     Spouse name: Not on file    Number of children: Not on file    Years of education: Not on file    Highest education level: Not on file   Occupational History    Not on file       Lymphedema Objective      Skin Appearance    well healed scar.    Left Upper Extremity Circumferential Measurements  Other: cm  Areola: cm  Breast Crease: cm  Axilla: 34.4 cm  Upper Proximal Humerus: 29.5 cm  Distal Humerus: 27.6 cm  Elbow: 25.3 cm  Mid-Forearm: 20.3 cm  Wrist: 15.6 cm  Distal Bucio Crease: 18.8 cm  Total: 171.5 cm    Right Upper Extremity Circumferential Measurements  Other: cm  Areola: cm  Breast Crease: cm  Axilla: 33.4 cm  Upper Proximal Humerus: 29.3 cm  Distal Humerus: 27.4 cm  Elbow: 26 cm  Mid-Forearm: 20.6 cm  Wrist: 15.6 cm  Distal Bucio Crease: 18.7 cm  Total: cm 171      Other Notes  Chest superior to areolas 94cm, arms raised.         Therapeutic Exercises (CPT 90220):       Therapeutic  Exercise Summary: Re-introduced foam roll sequence on roller as well as BKFO. Encouraged pt to choose 1-2 chest opening/UE stretching exercises to perform per day.     Therapeutic Treatments and Modalities:     Therapeutic Treatment and Modalities Summary: -reviewed importance of sleeve wear during gym activities; pt reporting not wearing it regularly during workouts.   -discussed scar mobilizations to L scar to break up tissue   -discussed obtaining compression bra to evaluate improvement in fullness sensation to L breast. Brand/sizing information provided.     Time-based treatments/modalities:    Physical Therapy Timed Treatment Charges  Therapeutic exercise minutes (CPT 44499): 12 minutes      Assessment and Plan:   Functional Impairments: abnormal/decreased or restricted ROM, lacks appropriate home exercise program and swelling    Assessment details:  Melva is a 58yo F known to this therapist from treatment for axillary web and R foot swelling. She has a hx of Stage IV (T2N1M1) invasive ductal carcinoma of the left outer lower quadrant of breast ER positive SD negative HER2 positive with a Ki-67 of 26% with PET positive and MRI positive T7 vertebral metastasis and received neoadjuvant TCH plus Perjeta. She underwent lumpectomy with lymph node dissection on 7/12/2022. In March of this year, she underwent removal of a radiation fiducial as it was causing increasing pain to her L breast due to scar tissue adhesions. She arrives today reporting fullness to her L breast and increased discomfort/pain to the inferior breast and rib area. Upon palpation, inferior breast and rib are not painful. She does demonstrate a large amount of scar tissue around lateral breast scar that was recently opened. Medial breast feels as though it is more dense than her R, possibly due to scar tissue deposits following radiation. Her L ROM into flexion and abduction is just short of full ROM and she reports tightness to that side while  attempting to achieve full range. She does report she will be undergoing a PET scan in June or early July. Will trial compression bra to remove any fluid that may be lingering, however, do not suspect this to be the cause of her discomfort. She does demonstrating tightened tissue at the breast, pectorals, and scars which may also be contributing to her limited ROM. Melva will benefit from intervention to improve her ROM, loosen tissues, and ensure she is not harboring fluid at her L breast.   Prognosis: fair      Goals:   Short Term Goals:  1. Pt will improve GHJ abduction ROM to full to improve ability to reach superolaterally.   2. Pt will improve GHJ flexion ROM to full  to improve ability to reach overhead.   3. Pt will obtain and wear compression bra to allow for fluid removal from L breast.   Short term goal time span:  2-4 weeks    Long Term Goals:  1. Pt will consistently perform chest opening/UE ROM exercises to allow for tissues to lengthen and avoid shortening from scar tissue deposits.   2. Pt will consistently wear her compression sleeve during gym activities to prevent development of lymphedema to her L arm.   Long term goal time span:  4-6 weeks  Planned therapy interventions:  Compression sleeve, decongestive exercises, home exercise program, intermittent compression, manual lymph drainage, strengthening exercises, soft tissue manual techniques (CPT 36579), skin/wound care, range of motion exercises, postural exercises, patient education, orthotic measurements/fitting and myofascial release techniques  Planned education:  Functional anatomy and physiology of the lymphatic system, pathophysiology of lymphedema, lymphedema exercise, compression bandaging, proper skin care/nutrition, lymphedema precautions, self massage, infection prevention, scar tissue management, breast cancer rehab, activity guidelines, dietary guidelines, skin care guidelines, home pump use, bandage removal and long term  self-management of lymphedema  Frequency:  2x month  Duration in weeks:  8  Discussed with:  Patient      Functional Assessment Used    LLIS    Referring provider co-signature:  I have reviewed this plan of care and my co-signature certifies the need for services.    Certification Period: 05/18/2023 to  07/13/23    Physician Signature: ________________________________ Date: ______________

## 2023-05-22 ENCOUNTER — HOSPITAL ENCOUNTER (OUTPATIENT)
Facility: MEDICAL CENTER | Age: 58
End: 2023-05-22
Attending: NURSE PRACTITIONER
Payer: COMMERCIAL

## 2023-05-22 PROCEDURE — 80053 COMPREHEN METABOLIC PANEL: CPT

## 2023-05-23 LAB
ALBUMIN SERPL BCP-MCNC: 4.1 G/DL (ref 3.2–4.9)
ALBUMIN/GLOB SERPL: 2.1 G/DL
ALP SERPL-CCNC: 58 U/L (ref 30–99)
ALT SERPL-CCNC: 17 U/L (ref 2–50)
ANION GAP SERPL CALC-SCNC: 11 MMOL/L (ref 7–16)
AST SERPL-CCNC: 17 U/L (ref 12–45)
BILIRUB SERPL-MCNC: 0.2 MG/DL (ref 0.1–1.5)
BUN SERPL-MCNC: 19 MG/DL (ref 8–22)
CALCIUM ALBUM COR SERPL-MCNC: 9.2 MG/DL (ref 8.5–10.5)
CALCIUM SERPL-MCNC: 9.3 MG/DL (ref 8.5–10.5)
CHLORIDE SERPL-SCNC: 106 MMOL/L (ref 96–112)
CO2 SERPL-SCNC: 24 MMOL/L (ref 20–33)
CREAT SERPL-MCNC: 0.6 MG/DL (ref 0.5–1.4)
GFR SERPLBLD CREATININE-BSD FMLA CKD-EPI: 103 ML/MIN/1.73 M 2
GLOBULIN SER CALC-MCNC: 2 G/DL (ref 1.9–3.5)
GLUCOSE SERPL-MCNC: 108 MG/DL (ref 65–99)
POTASSIUM SERPL-SCNC: 4.4 MMOL/L (ref 3.6–5.5)
PROT SERPL-MCNC: 6.1 G/DL (ref 6–8.2)
SODIUM SERPL-SCNC: 141 MMOL/L (ref 135–145)

## 2023-05-24 ENCOUNTER — PHYSICAL THERAPY (OUTPATIENT)
Dept: PHYSICAL THERAPY | Facility: REHABILITATION | Age: 58
End: 2023-05-24
Attending: ORTHOPAEDIC SURGERY
Payer: COMMERCIAL

## 2023-05-24 DIAGNOSIS — M79.671 PAIN IN RIGHT FOOT: ICD-10-CM

## 2023-05-24 PROCEDURE — 97116 GAIT TRAINING THERAPY: CPT

## 2023-05-24 PROCEDURE — 97110 THERAPEUTIC EXERCISES: CPT

## 2023-05-24 PROCEDURE — 97161 PT EVAL LOW COMPLEX 20 MIN: CPT

## 2023-05-24 ASSESSMENT — ENCOUNTER SYMPTOMS
PAIN SCALE: 0
PAIN SCALE AT HIGHEST: 4
PAIN SCALE AT LOWEST: 0

## 2023-05-24 NOTE — OP THERAPY EVALUATION
"  Outpatient Physical Therapy  INITIAL EVALUATION    [facility] Physical Therapy [place] [address]  [address]  [place] [address]  Phone:  [phone]  Fax:  [phone]    Date of Evaluation: 2023    Patient: Melva Sauceda  YOB: 1965  MRN: [id number]     Referring Provider: Alonso Adams M.D.  [address]  [address]  [place] [address]   Referring Diagnosis Pain in right foot [M79.671]     Time Calculation  Start time: 0850  Stop time: 1000 Time Calculation (min): 70 minutes         Chief Complaint: No chief complaint on file.    Visit Diagnoses     ICD-10-CM   1. Pain in right foot  M79.671       Date of onset of impairment: 2023    Subjective   History of Present Illness:     History of chief complaint:  History of cancer survivor and 2 yr. S/p R corrctive  1 st ray and 5th met surgery.  Noreen is an avid running who just completed her  marathon.  Patient 's greatest c/o in pain, neuropathy and selling that advance at around 10 miles while running.  Patient reprots pain with long duration walking barefoot.     Prior level of function:  Nurse    Pain:     Current pain ratin    At best pain ratin    At worst pain ratin    Location:  R mid-foot    Aggravating factors:  Walk on unlimited surfaces > 5' w/o pain  Walk barefoot > 3' w/o pain  Run 5 miles w/o pain  Squat past 90 degrees with feet flat  Lefs:>60  Sls R foot >5\"        Past Medical History:   Diagnosis Date    Cancer (HCC) 2021    Breast cancer - left    Cataract     H/O OU    COVID-19 2022    Detached retina, right     H/O    PONV (postoperative nausea and vomiting)     Prediabetes      Past Surgical History:   Procedure Laterality Date    PB MASTECTOMY, PARTIAL Left 2022    Procedure: LEFT ELIAN  LOCALIZED PARTIAL MASTECTOMY, LEFT SENTINEL NODE BIOPSY, POSSIBLE AXILLARY DISSECTION, BIOABSORBABLE FIDUCIAL MARKER PLACEMENT;  Surgeon: Shane Jorge M.D.;  Location: " SURGERY [facility];  Service: General    NODE BIOPSY SENTINEL Left 2022    Procedure: BIOPSY, LYMPH NODE, SENTINEL;  Surgeon: Shane Jorge M.D.;  Location: SURGERY [facility];  Service: General    AXILLARY NODE DISSECTION Left 2022    Procedure: LYMPHADENECTOMY,  AXILLARY;  Surgeon: Shane Jorge M.D.;  Location: SURGERY [facility];  Service: General    NC ULTRASONIC GUIDANCE, INTRAOPERATIVE Right 2021    Procedure: ULTRASOUND GUIDANCE;  Surgeon: Shane Jorge M.D.;  Location: SURGERY [facility];  Service: General    CATH PLACEMENT Right 2021    Procedure: INSERTION, CATHETER - PORT A;  Surgeon: Shane Jorge M.D.;  Location: SURGERY [facility];  Service: General    RETINAL DETACHMENT REPAIR Right     BUNIONECTOMY Right     CATARACT EXTRACTION WITH IOL Left     GYN SURGERY           LUMPECTOMY Bilateral     >20 years    OTHER      Cataract Extraction OD       Precautions:       Objective   Observation and functional movement:  Top tier: SMCD: squat(unable to squat >100deg), sls R>L, extension    Range of motion and strength:    Hip/knee ankle wnl but noted R hip resitance > 100 deg flexion and limited active IR    Noted R g min/med weakness as compared to L          Therapeutic Treatments and Modalities:     Therapeutic Treatment and Modalities Summary: Gait trg for t-plane and arm swing  Fitter t-plane trg.  S/l running man  Bridge marching--focus on asis control    Time-based treatments/modalities:    Physical Therapy Timed Treatment Charges  Gait training minutes (CPT 03539): 15 minutes  Therapeutic exercise minutes (CPT 95927): 10 minutes      Assessment, Response and Plan:   Assessment details:  Patient presents with noted R hip SMCD that may be effecting function of foot with running and walking activities-- poor medial /lateral knee control with functional squatting activities and walking.  Limited sls stance  with R foot struggling to find ground and  "over-active toe extensors and FL, ant activation.  Patient demonstrates limited t-plane pelvic motion with noted weak R post/lateral pelvic weakness.  Patient present with decreased arm swing, decrease transverse plane pelvic motion with ambulation.  Patient demonstrates weak posterior chain  and posterio-lateral chain strength.  Patient reported mil TTP of vmo, lateral gasroc, medial joint line and anterior tibialis.  Patient should do well if compliant with poc.     Prognosis: good    Goals:   Short Term Goals:   Walk on unlimited surfaces > 5' w/o pain  Walk barefoot > 3' w/o pain  Run 5 miles w/o pain  Squat past 90 degrees with feet flat  Lefs:>60  Sls R foot >5\"  Short term goal time span:  2-4 weeks      Long Term Goals:    Walk on unlimited surfaces > 5' w/o pain  Walk barefoot > 3' w/o pain  Run 10 miles w/o pain  Squat past 100 degrees with feet flat  Lefs:>70  Sls R foot >15\"  Long term goal time span:  6-8 weeks    Plan:   Therapy options:  Physical therapy treatment to continue  Planned therapy interventions:  E Stim Unattended (CPT 70782), Manual Therapy (CPT 40920), Gait Training (CPT 42684), Neuromuscular Re-education (CPT 31643) and Therapeutic Exercise (CPT 19353)  Other planned therapy interventions:  Dry needle  Frequency:  2x week  Duration in weeks:  8  Duration in visits:  16  Plan details:  Orthotc fit/modify, gait trg, fitter-t-plane, post chain, psot chain, local biomechanics assessment --hip, knee ankle, es-darius, balance trg.      Functional Assessment Used        Referring provider co-signature:  I have reviewed this plan of care and my co-signature certifies the need for services.    Certification Period: 05/24/2023 to  07/26/23    Physician Signature: ________________________________ Date: ______________          "

## 2023-05-31 ENCOUNTER — PHYSICAL THERAPY (OUTPATIENT)
Dept: PHYSICAL THERAPY | Facility: REHABILITATION | Age: 58
End: 2023-05-31
Attending: ORTHOPAEDIC SURGERY
Payer: COMMERCIAL

## 2023-05-31 DIAGNOSIS — R26.2 DIFFICULTY IN WALKING: ICD-10-CM

## 2023-05-31 PROCEDURE — 97116 GAIT TRAINING THERAPY: CPT

## 2023-05-31 PROCEDURE — 97530 THERAPEUTIC ACTIVITIES: CPT

## 2023-05-31 NOTE — OP THERAPY DAILY TREATMENT
Outpatient Physical Therapy  DAILY TREATMENT     [facility] Physical Therapy [address].  [address]  [place] [address]  Phone:  [phone]  Fax:  [phone]    Date: 05/31/2023    Patient: Melva Sauceda  YOB: 1965  MRN: [id number]     Time Calculation    Start time: 0851  Stop time: 0937 Time Calculation (min): 46 minutes         Chief Complaint: No chief complaint on file.    Visit #: 2    SUBJECTIVE:  R knee pain hurts with sit stand and still reports lymph edema and still getts paresthesia at 10 miles of running    OBJECTIVE:  Struggles with sls R>L          Therapeutic Treatments and Modalities:     Therapeutic Treatment and Modalities Summary:     Tandem gait slow--equal arnold side-to-side  Running man review--limited core stability  Fit orthotic with heat gun-- R first met medial post  TM: arnold and arm swing focus    Time-based treatments/modalities:    Physical Therapy Timed Treatment Charges  Gait training minutes (CPT 82723): 20 minutes  Therapeutic activity minutes (CPT 34027): 20 minutes      Pain rating (1-10) before treatment:  0  Pain rating (1-10) after treatment:  0    ASSESSMENT:   Response to treatment: decreased mid-stance time of R with slow tandem gait  Limited post arm swing with running and noted signficant  PLAN/RECOMMENDATIONS:   Progress core stab, sls progression, hip rotation progression, gait trg on TM

## 2023-06-06 ENCOUNTER — PHYSICAL THERAPY (OUTPATIENT)
Dept: PHYSICAL THERAPY | Facility: REHABILITATION | Age: 58
End: 2023-06-06
Attending: SURGERY
Payer: COMMERCIAL

## 2023-06-06 DIAGNOSIS — R26.2 DIFFICULTY IN WALKING: ICD-10-CM

## 2023-06-06 PROCEDURE — 97140 MANUAL THERAPY 1/> REGIONS: CPT

## 2023-06-06 PROCEDURE — 97014 ELECTRIC STIMULATION THERAPY: CPT

## 2023-06-06 PROCEDURE — 97110 THERAPEUTIC EXERCISES: CPT

## 2023-06-06 NOTE — OP THERAPY DAILY TREATMENT
Outpatient Physical Therapy  DAILY TREATMENT     [facility] Physical Therapy [address]  [address]  [place] [address]  Phone:  [phone]  Fax:  [phone]    Date: 06/06/2023    Patient: Melva Sauceda  YOB: 1965  MRN: [id number]     Time Calculation    Start time: 0720  Stop time: 0817 Time Calculation (min): 57 minutes         Chief Complaint: No chief complaint on file.    Visit #: 3    SUBJECTIVE:  Ran RTO 3 legs and shoes went well with patient denying foot pain but reported  knee soreness after race and after any runs    OBJECTIVE:            Therapeutic Treatments and Modalities:     Therapeutic Treatment and Modalities Summary: cfmIASTM: diat vl, [id number] pat insertion, ant tib  Cupping ant tib and distal VL  Russian 5/5/ mhp distal vl and ant tib x 15'  Sls clocks--hourly  Kinesio tape ant tib and vl    Time-based treatments/modalities:    Physical Therapy Timed Treatment Charges  Manual therapy minutes (CPT 48808): 20 minutes  Therapeutic exercise minutes (CPT 23901): 20 minutes      Pain rating (1-10) before treatment:  0 pain with tke  Pain rating (1-10) after treatment:  0 no pain with tke    ASSESSMENT:   Ran w/o foot pain but c/o increased lateral knee pain after run--ttp vl and ant tib.--abolished knee pain with tke after treatment  PLAN/RECOMMENDATIONS:   Progress core stab, sls progression, hip rotation progression, gait trg on TM  Assess ant tib DN???

## 2023-06-16 ENCOUNTER — PHYSICAL THERAPY (OUTPATIENT)
Dept: PHYSICAL THERAPY | Facility: REHABILITATION | Age: 58
End: 2023-06-16
Attending: SURGERY
Payer: COMMERCIAL

## 2023-06-16 DIAGNOSIS — M79.671 PAIN IN RIGHT FOOT: ICD-10-CM

## 2023-06-16 PROCEDURE — 97014 ELECTRIC STIMULATION THERAPY: CPT

## 2023-06-16 PROCEDURE — 97140 MANUAL THERAPY 1/> REGIONS: CPT

## 2023-06-16 PROCEDURE — 97110 THERAPEUTIC EXERCISES: CPT

## 2023-06-16 NOTE — OP THERAPY DAILY TREATMENT
"  Outpatient Physical Therapy  DAILY TREATMENT     [facility] Physical Therapy [address]  [address]  [place] [address]  Phone:  [phone]  Fax:  [phone]    Date: 06/16/2023    Patient: Melva Sauceda  YOB: 1965  MRN: [id number]     Time Calculation                   Chief Complaint: No chief complaint on file.    Visit #: 4    SUBJECTIVE:  Ran another 1/2 marathon last week with significnat soreness R knee since    OBJECTIVE:  Tttp vl,, VI, BF  S Squats to fatigue          Therapeutic Treatments and Modalities:     Therapeutic Treatment and Modalities Summary: Hasmtring ball curls   Ball bridge curls  Roller hamstring marching and rolling in/out  IASTM: VL/hamstrings  Reivewed walking and ex. progression  Mozuccj02/10 ball bridge tke and balloon msahs with mhp x 15\"  Pat. Medial glide tape      Time-based treatments/modalities:           Pain rating (1-10) before treatment:  1-2 pain with tke  Pain rating (1-10) after treatment:  0 no pain with tke    ASSESSMENT:   Ran  a 1/2 marathon w/o foot pain but c/o increased lateral knee pain after run--ttp vl and hamstring.-noted + pat .comnpression test with limited vmo recruitment and lateral tracking--decreased pain with medial pat tape -abolished knee pain with tke after treatment-  PLAN/RECOMMENDATIONS:   Progress core stab, sls progression, hip rotation progression, gait trg on TM, sls abd on wall         "

## 2023-06-20 ENCOUNTER — PHYSICAL THERAPY (OUTPATIENT)
Dept: PHYSICAL THERAPY | Facility: REHABILITATION | Age: 58
End: 2023-06-20
Attending: SURGERY
Payer: COMMERCIAL

## 2023-06-20 DIAGNOSIS — M79.671 PAIN IN RIGHT FOOT: ICD-10-CM

## 2023-06-20 DIAGNOSIS — R26.2 DIFFICULTY IN WALKING: ICD-10-CM

## 2023-06-20 PROCEDURE — 97140 MANUAL THERAPY 1/> REGIONS: CPT

## 2023-06-20 PROCEDURE — 97110 THERAPEUTIC EXERCISES: CPT

## 2023-06-20 PROCEDURE — 97014 ELECTRIC STIMULATION THERAPY: CPT

## 2023-06-21 NOTE — OP THERAPY DAILY TREATMENT
"  Outpatient Physical Therapy  DAILY TREATMENT     [facility] Physical Therapy [address]  [address]  [place] [address]  Phone:  [phone]  Fax:  [phone]    Date: 06/20/2023    Patient: Melva Sauceda  YOB: 1965  MRN: [id number]     Time Calculation    Start time: 1106  Stop time: 1204 Time Calculation (min): 58 minutes         Chief Complaint: No chief complaint on file.    Visit #: 5    SUBJECTIVE:  Ran 3rd half marathon last week with significant R knee soreness since yesterday    OBJECTIVE:  Tttp vl,, VI, BF            Therapeutic Treatments and Modalities:     Therapeutic Treatment and Modalities Summary:   IASTM: VL/ant tib/FL  Cupping VL, ant tib lateral pat   Russian 5/5/ tp VL and ant tib. mhp w/ (-) microcurrent pat w/ mhp x 15  Pat. Medial glide tape      Time-based treatments/modalities:    Physical Therapy Timed Treatment Charges  Manual therapy minutes (CPT 84552): 30 minutes  Therapeutic exercise minutes (CPT 84955): 10 minutes      Pain rating (1-10) before treatment:  8 pain with tke  Pain rating (1-10) after treatment: 3-mild with tke\" much better\"    ASSESSMENT:   severe pat. Knee pain past 24 hours w/ ttp vl, an tib, FL, ant. Tib.and noted acute pfps secondary to recent race  PLAN/RECOMMENDATIONS:   Progress core stab, sls progression, hip rotation progression, gait trg on TM, sls abd on wall         "

## 2023-06-22 ENCOUNTER — PHYSICAL THERAPY (OUTPATIENT)
Dept: PHYSICAL THERAPY | Facility: REHABILITATION | Age: 58
End: 2023-06-22
Attending: SURGERY
Payer: COMMERCIAL

## 2023-06-22 DIAGNOSIS — R26.2 DIFFICULTY IN WALKING: ICD-10-CM

## 2023-06-22 DIAGNOSIS — M79.671 PAIN IN RIGHT FOOT: ICD-10-CM

## 2023-06-22 PROCEDURE — 97014 ELECTRIC STIMULATION THERAPY: CPT

## 2023-06-22 PROCEDURE — 97110 THERAPEUTIC EXERCISES: CPT

## 2023-06-22 PROCEDURE — 97140 MANUAL THERAPY 1/> REGIONS: CPT

## 2023-06-22 NOTE — OP THERAPY DAILY TREATMENT
"  Outpatient Physical Therapy  DAILY TREATMENT     [facility] Physical Therapy [address].  [address]  [place] [address]  Phone:  [phone]  Fax:  [phone]    Date: 06/22/2023    Patient: Melva Sauceda  YOB: 1965  MRN: [id number]     Time Calculation    Start time: 1015  Stop time: 1130 Time Calculation (min): 75 minutes         Chief Complaint: No chief complaint on file.    Visit #: 6    SUBJECTIVE:  Felt good for 1/2 day but then pain returned with sever pain upon wt. Bearing and knee extension    OBJECTIVE:  Ttp: lateral joint line  (+) Razia's test  (+) Thessaly test--lateral joint line  (-) Lachman's--firm end feel  Frequent popping and clicking, denied locking  O/p tke valgus (+) joint line pain, no laxity           Therapeutic Treatments and Modalities:     Therapeutic Treatment and Modalities Summary: Stm/iastm vl, hamstring  Bent knee joint mobs gd 2-4  Ball roll for hamstring recruitment  Russian vl/hamstring 5/5/ and micro-current joint line (-) polarity x 15' mhp  Isntructed gait with spc, loftstrand and single axillary crutch    Time-based treatments/modalities:    Physical Therapy Timed Treatment Charges  Manual therapy minutes (CPT 22494): 20 minutes  Therapeutic exercise minutes (CPT 48667): 20 minutes      Pain rating (1-10) before treatment:  6  Pain rating (1-10) after treatment:  3-4\" a little better\"    ASSESSMENT:   Patient presents with hamstring guarding, severe joint line palpatory tenderness, and (+) cluster of test predictive for possible lateral meniscal tear?    PLAN/RECOMMENDATIONS:   Pt. To see ortho tomorrow         "

## 2023-06-23 ENCOUNTER — HOSPITAL ENCOUNTER (OUTPATIENT)
Dept: CARDIOLOGY | Facility: MEDICAL CENTER | Age: 58
End: 2023-06-23
Attending: INTERNAL MEDICINE
Payer: COMMERCIAL

## 2023-06-23 DIAGNOSIS — C50.512 MALIGNANT NEOPLASM OF LOWER-OUTER QUADRANT OF LEFT FEMALE BREAST, UNSPECIFIED ESTROGEN RECEPTOR STATUS (HCC): ICD-10-CM

## 2023-06-23 LAB — LV EJECT FRACT  99904: 60

## 2023-06-23 PROCEDURE — 93306 TTE W/DOPPLER COMPLETE: CPT | Mod: 26 | Performed by: INTERNAL MEDICINE

## 2023-06-23 PROCEDURE — 93306 TTE W/DOPPLER COMPLETE: CPT

## 2023-06-27 ENCOUNTER — PHYSICAL THERAPY (OUTPATIENT)
Dept: PHYSICAL THERAPY | Facility: REHABILITATION | Age: 58
End: 2023-06-27
Attending: SURGERY
Payer: COMMERCIAL

## 2023-06-27 DIAGNOSIS — R26.2 DIFFICULTY IN WALKING: ICD-10-CM

## 2023-06-27 DIAGNOSIS — M79.671 PAIN IN RIGHT FOOT: ICD-10-CM

## 2023-06-27 PROCEDURE — 97110 THERAPEUTIC EXERCISES: CPT

## 2023-06-27 PROCEDURE — 97140 MANUAL THERAPY 1/> REGIONS: CPT

## 2023-06-27 PROCEDURE — 97014 ELECTRIC STIMULATION THERAPY: CPT

## 2023-06-27 NOTE — OP THERAPY DAILY TREATMENT
"  Outpatient Physical Therapy  DAILY TREATMENT     [facility] Physical Therapy [address]  [address]  [place] [address]  Phone:  [phone]  Fax:  [phone]    Date: 06/27/2023    Patient: Melva Sauceda  YOB: 1965  MRN: [id number]     Time Calculation                   Chief Complaint: No chief complaint on file.    Visit #: 7    SUBJECTIVE:  Cont. To report severe pain with ambulation and unable to fully bend or straighten knee-saw md. And completed xray--no significant pathology--pt. Sent back to PT by ortho to address knee function and if no improvement in a month, recommend MRI    OBJECTIVE:  Ttp: lateral joint line  (+) Razia's test  (+) Thessaly test--lateral joint line  (-) Lachman's--firm end feel  Noted significant lateral gastroc guarding      Therapeutic Treatments and Modalities:     Therapeutic Treatment and Modalities Summary: Balloon bounce smash with DF--hep  Ball roll  Ball bridge/roll with focus on hams and quads  Prone tke for quad recruitment into tke--feet on bolster--added active hamstring recruitment  Prone tke fib.  joint mobs gd 2-4  Ball roll for hamstring recruitment--hep  Russian vl/hamstring 5/5/ and micro-current joint line (-) polarity x 15' mhp  Instructed gait with spc, loftstrand and single axillary crutch--reviewed importance to minimize flare ups    Time-based treatments/modalities:           Pain rating (1-10) before treatment:  6  Pain rating (1-10) after treatment:  3-4\" a little better\"    ASSESSMENT:   Patient presents with hamstring guarding, severe joint line palpatory tenderness, and (+) cluster of test predictive for possible lateral meniscal tear?    PLAN/RECOMMENDATIONS:   Pt. To see ortho tomorrow         "

## 2023-07-03 ENCOUNTER — HOSPITAL ENCOUNTER (OUTPATIENT)
Dept: RADIOLOGY | Facility: MEDICAL CENTER | Age: 58
End: 2023-07-03
Attending: INTERNAL MEDICINE
Payer: COMMERCIAL

## 2023-07-05 ENCOUNTER — PHYSICAL THERAPY (OUTPATIENT)
Dept: PHYSICAL THERAPY | Facility: REHABILITATION | Age: 58
End: 2023-07-05
Attending: SURGERY
Payer: COMMERCIAL

## 2023-07-05 DIAGNOSIS — R26.2 DIFFICULTY IN WALKING: ICD-10-CM

## 2023-07-05 PROCEDURE — 97110 THERAPEUTIC EXERCISES: CPT

## 2023-07-05 NOTE — OP THERAPY DAILY TREATMENT
"  Outpatient Physical Therapy  DAILY TREATMENT     [facility] Physical Therapy [address].  [address]  [place] [address]  Phone:  [phone]  Fax:  [phone]    Date: 07/05/2023    Patient: Melva Sauceda  YOB: 1965  MRN: [id number]     Time Calculation    Start time: 0415  Stop time: 0455 Time Calculation (min): 40 minutes         Chief Complaint: No chief complaint on file.    Visit #: 8    SUBJECTIVE:  Patient reports swimming and trying to exercise wqith little change in functional pain.  Patient reports that she wakes more than 6/night due3 to pain, patient reports that she is unable to get dressed, ambulate up/down 1 step or walk without severe knee pain    OBJECTIVE:  Ttp: lateral joint line  (+) Razia's test  - r knee arom   Noted cont. significant lateral gastroc guarding      Therapeutic Treatments and Modalities:     Therapeutic Treatment and Modalities Summary: Balloon bounce smash with DF-reviewed    Ball roll--reviewed  Ball bridge/roll with focus on hams and quads--reviewed    Prone tke fib.  joint mobs gd 2-4// pan limiting-- no chsnge in arom PROM -7 deg  Ball roll for hamstring recruitment--hep  reviewed gait with spc, loftstrand and single axillary crutch--reviewed importance to minimize flare ups    Time-based treatments/modalities:    Physical Therapy Timed Treatment Charges  Manual therapy minutes (CPT 92942): 5 minutes  Therapeutic exercise minutes (CPT 61519): 20 minutes      Pain rating (1-10) before treatment:  6  Pain rating (1-10) after treatment:  no change 6/10    ASSESSMENT:   Patient does not present with any functional improvements and is unable to tolerated any progressive exercise due to joint line \" burn.\" Patient continues to demonstrate bent knee midstance with a single crutch.  Patient is unable to ginette socks, shoes, pants or underwear w/o significant pain.  Patient wakes up at least 6 times per night due to knee pain.  Patient has " been seen for 4 visits addressing knee pain/limitations without improvement.    Patient was originally seen for foot pain and was progressing but she is unable to progress with foot or knee treatment due to pain.  Suspect and internal derangement Of right knee that appears unlikely to recover from conservative treatment.  PLAN/RECOMMENDATIONS:   Recommend discharging patient back to orthopedist for further evaluation and MRI.

## 2023-07-06 NOTE — OP THERAPY DISCHARGE SUMMARY
"  Outpatient Physical Therapy  DISCHARGE SUMMARY NOTE      [facility] Physical Therapy [address].  [address]  [place] [address]  Phone:  [phone]  Fax:  [phone]    Date of Visit: 07/05/2023    Patient: Melva Sauceda  YOB: 1965  MRN: [id number]     Referring Provider: Alonso Adams M.D.  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  Patient reports that she wakes more than 6/night due to pain, patient reports that she is unable to get dressed, ambulate up/down 1 step or walk without severe knee pain    OBJECTIVE:  Ttp: lateral joint line  (+) Razia's test  - r knee arom   Noted cont. significant lateral gastroc guarding        ASSESSMENT:   Patient does not present with any functional improvements and is unable to tolerate any progressive exercise due to joint line \" burn.\" Patient continues to demonstrate bent knee midstance with a single crutch.  Patient is unable to ginette socks, shoes, pants or underwear w/o significant pain.  Patient wakes up at least 6 times per night due to knee pain.  Patient has been seen for 4 visits addressing knee pain/limitations without improvement.    Patient was originally seen for foot pain and was progressing but she is unable to progress with foot or knee treatment due to pain.  Suspect and internal derangement Of right knee that appears unlikely to recover from conservative treatment.  PLAN/RECOMMENDATIONS:   Recommend discharging patient back to orthopedist for further evaluation and MRI.    Perez Stone, PT, DPT, OCS    Date: 7/5/2023         "

## 2023-07-11 ENCOUNTER — APPOINTMENT (OUTPATIENT)
Dept: PHYSICAL THERAPY | Facility: REHABILITATION | Age: 58
End: 2023-07-11
Attending: SURGERY
Payer: COMMERCIAL

## 2023-07-13 ENCOUNTER — APPOINTMENT (OUTPATIENT)
Dept: PHYSICAL THERAPY | Facility: REHABILITATION | Age: 58
End: 2023-07-13
Attending: SURGERY
Payer: COMMERCIAL

## 2023-07-18 ENCOUNTER — APPOINTMENT (OUTPATIENT)
Dept: PHYSICAL THERAPY | Facility: REHABILITATION | Age: 58
End: 2023-07-18
Attending: SURGERY
Payer: COMMERCIAL

## 2023-07-20 ENCOUNTER — PHYSICAL THERAPY (OUTPATIENT)
Dept: PHYSICAL THERAPY | Facility: REHABILITATION | Age: 58
End: 2023-07-20
Attending: SURGERY
Payer: COMMERCIAL

## 2023-07-20 DIAGNOSIS — S83.281A OTHER TEAR OF LATERAL MENISCUS, CURRENT INJURY, RIGHT KNEE, INITIAL ENCOUNTER: ICD-10-CM

## 2023-07-20 PROCEDURE — 97110 THERAPEUTIC EXERCISES: CPT | Performed by: PHYSICAL THERAPIST

## 2023-07-20 PROCEDURE — 97162 PT EVAL MOD COMPLEX 30 MIN: CPT | Performed by: PHYSICAL THERAPIST

## 2023-07-20 SDOH — ECONOMIC STABILITY: GENERAL: QUALITY OF LIFE: GOOD

## 2023-07-20 ASSESSMENT — ENCOUNTER SYMPTOMS
ALLEVIATING FACTORS: COLD/ICE
ALLEVIATING FACTORS: LYING DOWN
EXACERBATED BY: TWISTING
EXACERBATED BY: PIVOTING
EXACERBATED BY: KNEELING
QUALITY: ACHING
EXACERBATED BY: STAIR CLIMBING
ALLEVIATING FACTORS: REST
QUALITY: STABBING
EXACERBATED BY: RUNNING
PAIN TIMING: WHEN ACTIVE
PAIN TIMING: INTERMITTENT
QUALITY: SHARP

## 2023-07-20 NOTE — OP THERAPY EVALUATION
Outpatient Physical Therapy  INITIAL EVALUATION    [address].  [address]  [place] [address]  Phone:  [phone]  Fax:  [phone]    Date of Evaluation: 07/20/2023    Patient: Melva Sauceda  YOB: 1965  MRN: [id number]     Referring Provider: Alonso Adams M.D.  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 Other tear of lateral meniscus, current injury, right knee, initial encounter  S83.281A       Date of onset of impairment: 6/10/2023    Subjective:   History of Present Illness:     Mechanism of injury:  Patient reports that she has been coming to physical therapy here for her foot, however during the course of care with her therapist, she began to experience considerable R knee discomfort. She was discharged from PT to return to her physician for further diagnosis which revealed a lateral meniscus tear.     Patient reports that she believes that the injury occurred after a marathon when she was hiking and then fell and landed awkwardly on her knee. She did several half marathons after this, including a lot of downhill portions which seemed to make things worse.    She was given the option to seek conservative treatment prior to worst case scenario of undergoing surgical intervention. She has been out of physical therapy for about 2 weeks and has noted some independent progress during this time.    Her symptoms are intermittent catching and clicking or popping along with pain. She feels pain mostly with pivoting and going down stairs. She also feels a lot of pain if she forgets about the injury and crosses her legs or makes a similar maneuver.     She has mostly been doing  walking and hamstring work since her diagnosis. She is also doing knee extension on a machine at the gym but is only able to do the lightest weight on RLE.     Patient would like to return to marathon and endurance running.     Patient is still on chemo for breast cancer.  Quality of life:  Good  Sleep disturbance:  Interrupted sleep  Pain:     Quality:  Aching, stabbing and sharp    Pain timing:  Intermittent and when active    Relieving factors:  Rest, cold/ice and lying down    Aggravating factors:  Stair climbing, pivoting, running, kneeling and twisting  Diagnostic Tests:     MRI studies: abnormal      Diagnostic Tests Comments:  Lateral meniscus tear of R knee. Report currently unavailable.   Patient Goals:     Patient goals for therapy:  Return to sport/leisure activities, decreased edema and decreased pain    Other patient goals:  Patient would like to return to doing marathons and half marathons and a variety of races      Past Medical History:   Diagnosis Date    Cancer (HCC) 12/2021    Breast cancer - left    Cataract     H/O OU    COVID-19 06/11/2022    Detached retina, right     H/O    PONV (postoperative nausea and vomiting)     Prediabetes      Past Surgical History:   Procedure Laterality Date    PB MASTECTOMY, PARTIAL Left 7/12/2022    Procedure: LEFT ELIAN  LOCALIZED PARTIAL MASTECTOMY, LEFT SENTINEL NODE BIOPSY, POSSIBLE AXILLARY DISSECTION, BIOABSORBABLE FIDUCIAL MARKER PLACEMENT;  Surgeon: Shane Jorge M.D.;  Location: SURGERY [facility];  Service: General    NODE BIOPSY SENTINEL Left 7/12/2022    Procedure: BIOPSY, LYMPH NODE, SENTINEL;  Surgeon: Shane Jorge M.D.;  Location: [facility];  Service: General    AXILLARY NODE DISSECTION Left 7/12/2022    Procedure: LYMPHADENECTOMY,  AXILLARY;  Surgeon: Shane Jorge M.D.;  Location: [facility];  Service: General    [place] ULTRASONIC GUIDANCE, INTRAOPERATIVE Right 12/31/2021    Procedure: ULTRASOUND GUIDANCE;  Surgeon: Shane  MICHAEL Jorge M.D.;  Location: SURGERY [facility];  Service: General    CATH PLACEMENT Right 2021    Procedure: INSERTION, CATHETER - PORT A;  Surgeon: Shane Jorge M.D.;  Location: SURGERY [facility];  Service: General    RETINAL DETACHMENT REPAIR Right 2014    BUNIONECTOMY Right     CATARACT EXTRACTION WITH IOL Left     GYN SURGERY           LUMPECTOMY Bilateral     >20 years    OTHER      Cataract Extraction OD     Social History     Tobacco Use    Smoking status: Never    Smokeless tobacco: Never   Substance Use Topics    Alcohol use: Yes     Comment: 1 drink per day     Family and Occupational History     Socioeconomic History    Marital status: Single     Spouse name: Not on file    Number of children: Not on file    Years of education: Not on file    Highest education level: Not on file   Occupational History    Not on file       Objective     Observations     Additional Observation Details  Patient holds her R knee in position of flexion, especially crossing her LLE under it as position of comfort    Palpation     Right   Hypertonic in the distal biceps femoris, distal semimembranosus, distal semitendinosus, lateral gastrocnemius and medial gastrocnemius.   Tenderness of the distal biceps femoris, distal semimembranosus, distal semitendinosus, lateral gastrocnemius, medial gastrocnemius, rectus femoris, vastus lateralis and vastus medialis.     Additional Palpation Details  Tenderness and hypertonicity in R sartorius    Active Range of Motion   Left Knee   Normal active range of motion  Flexion: 135 degrees   Extension: 0 degrees     Right Knee   Flexion: 125 degrees with pain  Extension: -30 (lacking 30 deg extension due to pain) degrees with pain  Extensor la degrees     Passive Range of Motion     Right Knee   Extension: -15 (lacking 15 deg) degrees     Patellar Mobility   Left Knee Patellar tendons within functional limits include the medial, lateral, superior and inferior.      Right Knee Patellar tendons within functional limits include the medial, lateral, superior and inferior.     Strength:      Left Knee   Flexion: 5  Extension: 5    Right Knee   Flexion: 4- (limited by pain)  Extension: 3- (limited by pain)  Quadriceps contraction: good    Additional Strength Details  Hip extension: 4+/5 bilateral  Hip abduction: 4/5 bilateral (tested short lever arm)    Ambulation     Comments   Antalgic gait with decreased stance time and early heel off on RLE. Patient does not achieve terminal knee extension during stance phase on RLE.    General Comments     Knee Comments  Mild swelling observed diffusely at R anterolateral knee. Tenderness greatest anterolateral joint line, consistent with imaging confirming presence of lateral meniscus tear.        Therapeutic Exercises (CPT 55120):     1. Ankle pumps, x20    2. Heel slides, x10    3. HL bridge, x10, Modified increased knee extension to minimize pain    4. Soleus stretch, 1'    5. Hamstring stretch, 1'      Time-based treatments/modalities:    Physical Therapy Timed Treatment Charges  Therapeutic exercise minutes (CPT 11272): 10 minutes      Assessment, Response and Plan:   Impairments: abnormal gait, abnormal or restricted ROM, pain with function and swelling    Prognosis: fair    Goals:   Short Term Goals:   1. Patient will demo regular independent performance of tailored HEP  2. Patient will demo full R knee extension AROM  3. Patient will demo R knee extension strength >/= 3+/5  Short term goal time span:  2-4 weeks      Long Term Goals:    1. Patient will demo R knee flexion AROM >/= 130 decrease  2. Patient will demo R knee extension strength >/= 4/5 with </= 3/10 pain  3. Patient will demo normal reciprocal gait for >/= 500 ft in order to indicate ability to return to community ambulation distances without symptom exacerbation  Long term goal time span:  6-8 weeks    Plan:   Planned therapy interventions:  Therapeutic Activities (CPT  05600), Therapeutic Exercise (CPT 54044), Manual Therapy (CPT 34376) and E Stim Unattended (CPT 44200)  Frequency:  2x week  Duration in weeks:  8  Duration in visits:  16      Functional Assessment Used        Referring provider co-signature:  I have reviewed this plan of care and my co-signature certifies the need for services.    Certification Period: 07/20/2023 to  09/14/23    Physician Signature: ________________________________ Date: ______________

## 2023-07-23 ENCOUNTER — OFFICE VISIT (OUTPATIENT)
Dept: URGENT CARE | Facility: CLINIC | Age: 58
End: 2023-07-23
Payer: COMMERCIAL

## 2023-07-23 VITALS
TEMPERATURE: 97.7 F | SYSTOLIC BLOOD PRESSURE: 140 MMHG | BODY MASS INDEX: 25.16 KG/M2 | HEIGHT: 68 IN | HEART RATE: 60 BPM | OXYGEN SATURATION: 97 % | DIASTOLIC BLOOD PRESSURE: 86 MMHG | RESPIRATION RATE: 16 BRPM | WEIGHT: 166 LBS

## 2023-07-23 DIAGNOSIS — R21 DIFFUSE PAPULAR RASH: ICD-10-CM

## 2023-07-23 PROCEDURE — 99213 OFFICE O/P EST LOW 20 MIN: CPT | Performed by: PHYSICIAN ASSISTANT

## 2023-07-23 PROCEDURE — 3077F SYST BP >= 140 MM HG: CPT | Performed by: PHYSICIAN ASSISTANT

## 2023-07-23 PROCEDURE — 3079F DIAST BP 80-89 MM HG: CPT | Performed by: PHYSICIAN ASSISTANT

## 2023-07-23 RX ORDER — PREDNISONE 20 MG/1
TABLET ORAL
Qty: 10 TABLET | Refills: 0 | Status: SHIPPED | OUTPATIENT
Start: 2023-07-23 | End: 2023-10-25

## 2023-07-23 ASSESSMENT — FIBROSIS 4 INDEX: FIB4 SCORE: 0.92

## 2023-07-23 NOTE — PROGRESS NOTES
Subjective:   Melva Sauceda is a 57 y.o. female who presents for Insect Bite (X 7days/Start spreding all boady/Belive it bug's bites, did't see it any bugs to be sure about.)  This is a very pleasant 57-year-old woman who presents with chief complaint of rash that has been noted for approximately 7 days on bilateral upper and lower extremities abdomen and back.  She reports it is quite itchy.  She denies bug bites.  She did search her mattress for signs of bedbugs without any indication.  She states she washed all of her clothes.  She denies new detergents soaps or lotions.  No recent foreign travel.  No new medications.  She does have stage IV breast cancer but none of her medications have changed.  Not have placed.  She has been taking Benadryl at night.  No other constitutional symptoms.    Medications:  hydrOXYzine HCl Tabs    Allergies:             Patient has no known allergies.    Surgical History:         Past Surgical History:   Procedure Laterality Date    PB MASTECTOMY, PARTIAL Left 7/12/2022    Procedure: LEFT ELIAN  LOCALIZED PARTIAL MASTECTOMY, LEFT SENTINEL NODE BIOPSY, POSSIBLE AXILLARY DISSECTION, BIOABSORBABLE FIDUCIAL MARKER PLACEMENT;  Surgeon: Shane Jorge M.D.;  Location: [facility];  Service: General    NODE BIOPSY SENTINEL Left 7/12/2022    Procedure: BIOPSY, LYMPH NODE, SENTINEL;  Surgeon: Shane Jorge M.D.;  Location: [facility];  Service: General    AXILLARY NODE DISSECTION Left 7/12/2022    Procedure: LYMPHADENECTOMY,  AXILLARY;  Surgeon: Shane Jorge M.D.;  Location: [facility];  Service: General    [place] ULTRASONIC GUIDANCE, INTRAOPERATIVE Right 12/31/2021    Procedure: ULTRASOUND GUIDANCE;  Surgeon: Shane Jorge M.D.;  Location: [facility];  Service: General    CATH PLACEMENT Right 12/31/2021    Procedure: INSERTION, CATHETER - PORT A;  Surgeon: Shane Jorge M.D.;  Location: [facility];  Service: General    RETINAL DETACHMENT  "REPAIR Right 2014    BUNIONECTOMY Right     CATARACT EXTRACTION WITH IOL Left     GYN SURGERY       1998    LUMPECTOMY Bilateral     >20 years    OTHER      Cataract Extraction OD       Past Social Hx:  Melva Sauceda  reports that she has never smoked. She has never used smokeless tobacco. She reports current alcohol use. She reports that she does not use drugs.     Past Family Hx:   Melva Sauceda family history includes Arrythmia in her mother; Cancer in her mother; Hypertension in her mother.       Problem list, medications, and allergies reviewed by myself today in Epic.     Objective:     BP (!) 140/86 (Patient Position: Sitting)   Pulse 60   Temp 36.5 °C (97.7 °F) (Temporal)   Resp 16   Ht 1.727 m (5' 8\")   Wt 75.3 kg (166 lb)   SpO2 97%   BMI 25.24 kg/m²     Physical Exam  Vitals and nursing note reviewed.   Constitutional:       General: She is not in acute distress.     Appearance: She is well-developed. She is not ill-appearing, toxic-appearing or diaphoretic.      Comments: This is a nontoxic-appearing child in no apparent distress   HENT:      Head: Normocephalic.      Nose: Nose normal.      Mouth/Throat:      Mouth: Mucous membranes are moist.   Eyes:      Extraocular Movements: Extraocular movements intact.      Conjunctiva/sclera: Conjunctivae normal.      Pupils: Pupils are equal, round, and reactive to light.   Cardiovascular:      Rate and Rhythm: Normal rate and regular rhythm.      Heart sounds: Normal heart sounds.   Pulmonary:      Effort: Pulmonary effort is normal. No accessory muscle usage or respiratory distress.      Breath sounds: Normal breath sounds. No decreased breath sounds, wheezing, rhonchi or rales.   Musculoskeletal:         General: Normal range of motion.      Cervical back: Normal range of motion and neck supple. No rigidity.   Lymphadenopathy:      Cervical: No cervical adenopathy.   Skin:     General: Skin is warm and dry.      Findings: Erythema and rash " present.      Comments: Diffuse fine papular rash noted over abdomen, to a lesser extent over thoracic region with several papules noted to the arms and legs.  This is nonlinear.  There are no pustular lesions.  There is no surrounding erythema.  They are nonraised and monitor urticarial.  They are nonvesicular.   Neurological:      Mental Status: She is alert and oriented to person, place, and time.   Psychiatric:         Behavior: Behavior is cooperative.         Assessment/Plan:     Diagnosis and Associated Orders:     1. Diffuse papular rash  - predniSONE (DELTASONE) 20 MG Tab; Tab 2 po qd x 5 days  Dispense: 10 Tablet; Refill: 0        Comments/MDM:    Patient presents with diffuse papular pruritic rash.  None linear, nonvesicular, nonpustular.  Do not appear consistent with insect bites.  Allergic dermatitis.,  Nonsedating antihistamine in the a.m. and Benadryl at night.  She does have triamcinolone cream at home that she has tried.  She can continue to use this twice daily.  If symptoms do not tram or become more diffuse I would recommend a course of prednisone.  No indication for antibiotics.  Does not appear infectious.     -May clean area with mild soap, do not scrub, wash with tepid water, tap dry  -May continue to moist elevate you urize skin with neutral lotion like Gold Bond, Cerave, Eucerin  -Monitor for increase in rash size or areas affected, pain, itchiness, redness with blistering- re-evaluate   I personally reviewed prior external notes and test results pertinent to today's visit. Supportive care, natural history, differential diagnoses, and indications for immediate follow-up discussed. Return to clinic or go to ED if symptoms worsen or persist.  Red flag symptoms discussed.  Patient/Parent/Guardian voices understanding. Follow-up with your primary care provider in 3-5 days.  All side effects of medication discussed including allergic response, GI upset, tendon injury, rash, sedation  etc    Please note that this dictation was created using voice recognition software. I have made a reasonable attempt to correct obvious errors, but I expect that there are errors of grammar and possibly content that I did not discover before finalizing the note.    This note was electronically signed by Ramona Gonzalez PA-C

## 2023-07-25 ENCOUNTER — PHYSICAL THERAPY (OUTPATIENT)
Dept: PHYSICAL THERAPY | Facility: REHABILITATION | Age: 58
End: 2023-07-25
Attending: SURGERY
Payer: COMMERCIAL

## 2023-07-25 DIAGNOSIS — S83.281A OTHER TEAR OF LATERAL MENISCUS, CURRENT INJURY, RIGHT KNEE, INITIAL ENCOUNTER: ICD-10-CM

## 2023-07-25 DIAGNOSIS — R26.2 DIFFICULTY IN WALKING: ICD-10-CM

## 2023-07-25 PROCEDURE — 97140 MANUAL THERAPY 1/> REGIONS: CPT

## 2023-07-25 PROCEDURE — 97014 ELECTRIC STIMULATION THERAPY: CPT

## 2023-07-25 PROCEDURE — 97110 THERAPEUTIC EXERCISES: CPT

## 2023-07-25 NOTE — OP THERAPY DAILY TREATMENT
Outpatient Physical Therapy  DAILY TREATMENT     [facility] Physical Therapy [address].  [address]  [place] [address]  Phone:  [phone]  Fax:  [phone]    Date: 07/25/2023    Patient: Melva Sauceda  YOB: 1965  MRN: [id number]     Time Calculation    Start time: 0719  Stop time: 0820 Time Calculation (min): 61 minutes         Chief Complaint: No chief complaint on file.    Visit #: 2    SUBJECTIVE:  Overall a little better but been on dexamethasone past week due to rash with knee feeling better    OBJECTIVE:  - r knee          Therapeutic Treatments and Modalities:     Therapeutic Treatment and Modalities Summary: Supine femur bolster with tibial a/p with aa extension gd 3-4  Prone tke off edge of plinth with resisted PF #0--progressed with pillow cases from 1' off of plinth to flat--added cups to lateral gastroc last 5'   ART lateral gastroc  S/l add/extension  David dead lifts --on wedge DF/PF  Reviewed machine curls/ extension  Slow TKE focus gait  Standing tke into ball with mini step with focus on R quad glut recruitment.  Russian 10/10 vmo/lat gastroc with focus on quads  Tke band #2 in door with tke and L mini step--hep    Time-based treatments/modalities:    Physical Therapy Timed Treatment Charges  Manual therapy minutes (CPT 74698): 20 minutes  Therapeutic exercise minutes (CPT 48719): 25 minutes      Pain rating (1-10) before treatment:  no pain but knee bent  Pain rating (1-10) after treatment:    0-135 2-3 lag  ASSESSMENT:   Improved  functional knee extension but still limited by pain at times,, noted lat. Gastroc guarding--cont. Tendency toward bent knee mid-stance    PLAN/RECOMMENDATIONS:   Mbs, cupping, IAsTM DN, progressive quad and hamstring strength with ckc/okc combined

## 2023-07-28 ENCOUNTER — PHYSICAL THERAPY (OUTPATIENT)
Dept: PHYSICAL THERAPY | Facility: REHABILITATION | Age: 58
End: 2023-07-28
Attending: SURGERY
Payer: COMMERCIAL

## 2023-07-28 DIAGNOSIS — M23.306 DERANGEMENT OF MENISCUS OF RIGHT KNEE: ICD-10-CM

## 2023-07-28 PROCEDURE — 97014 ELECTRIC STIMULATION THERAPY: CPT

## 2023-07-28 NOTE — OP THERAPY DAILY TREATMENT
Outpatient Physical Therapy  DAILY TREATMENT     [facility] Physical Therapy [address].  [address]  [place] [address]  Phone:  [phone]  Fax:  [phone]    Date: 07/28/2023    Patient: Melva Sauceda  YOB: 1965  MRN: [id number]     Time Calculation    Start time: 0800  Stop time: 0915 Time Calculation (min): 75 minutes         Chief Complaint: No chief complaint on file.    Visit #: 3    SUBJECTIVE:  Aobut the same.. not as many sharp shots of pain       OBJECTIVE:  -0-130 r knee--5 deg lag          Therapeutic Treatments and Modalities:     Therapeutic Treatment and Modalities Summary: Bike x 5'  DN: Patient signed informed written release and verbally agreed with informed consent to procedure of dry needling   skin prep with isopropyl  Chlora prep  R lateral gastroc --prox/distal to knee joint line  -TENS w/ FDN  -MHP x 10'  -No adverse reactions observed post treatment   Backwards forward  on line gait with focus on R tke  Sls with challenge rotation for hip IR in 4 direction  #5 band--hep  Russian vmo/gastroc 10/10 reciprocal x 10' then 5/5 mhjp with DF mhp        Time-based treatments/modalities:    Physical Therapy Timed Treatment Charges  Manual therapy minutes (CPT 87268): 20 minutes  Therapeutic exercise minutes (CPT 34483): 20 minutes      Pain rating (1-10) before treatment:  no pain but knee bent  Pain rating (1-10) after treatment:  straight knee with gait tr.  0-135   ASSESSMENT:   Improved  functional knee extension with decreased pain with walking after treatment.  Patient reported increased sensation to lateral R foot after treatment    PLAN/RECOMMENDATIONS:   Mbs, cupping, IAsTM DN, progressive quad and hamstring strength with ckc/okc combined

## 2023-08-02 ENCOUNTER — PHYSICAL THERAPY (OUTPATIENT)
Dept: PHYSICAL THERAPY | Facility: REHABILITATION | Age: 58
End: 2023-08-02
Attending: SURGERY
Payer: COMMERCIAL

## 2023-08-02 DIAGNOSIS — M79.671 PAIN IN RIGHT FOOT: ICD-10-CM

## 2023-08-02 DIAGNOSIS — R26.2 DIFFICULTY IN WALKING: ICD-10-CM

## 2023-08-02 DIAGNOSIS — S83.281A OTHER TEAR OF LATERAL MENISCUS, CURRENT INJURY, RIGHT KNEE, INITIAL ENCOUNTER: ICD-10-CM

## 2023-08-02 PROCEDURE — 97110 THERAPEUTIC EXERCISES: CPT

## 2023-08-02 PROCEDURE — 97140 MANUAL THERAPY 1/> REGIONS: CPT

## 2023-08-02 PROCEDURE — 97014 ELECTRIC STIMULATION THERAPY: CPT

## 2023-08-02 NOTE — OP THERAPY DAILY TREATMENT
"  Outpatient Physical Therapy  DAILY TREATMENT     [facility] Physical Therapy [address].  [address]  [place] [address]  Phone:  [phone]  Fax:  [phone]    Date: 08/02/2023    Patient: Melva Sauceda  YOB: 1965  MRN: [id number]     Time Calculation    Start time: 0720  Stop time: 0830 Time Calculation (min): 70 minutes         Chief Complaint: No chief complaint on file.    Visit #: 10    SUBJECTIVE:  Overall, better since last visit with some pain occasional \"slight burn\"      OBJECTIVE:  -0-138r knee-no lag          Therapeutic Treatments and Modalities:     Therapeutic Treatment and Modalities Summary: Prone tke er lock-home mechanism with ART to gastroc/soleus  Wall df lunge progression --both legs  Sls and tandemand feet tocuhing on 2 pillows--hep  DN: Patient signed informed written release and verbally agreed with informed consent to procedure of dry needling   skin prep with isopropyl  Chlora prep  R lateral gastroc --prox/distal to knee joint line  -TENS w/ FDN    -No adverse reactions observed post treatment  A/p mobs fib head gd 3-4  in end range extension with AP   Bermudian gastroc x 10' 5/5 mhjp with DF mhp// no pain after walking and standing        Time-based treatments/modalities:    Physical Therapy Timed Treatment Charges  Manual therapy minutes (CPT 08595): 20 minutes  Therapeutic exercise minutes (CPT 42185): 20 minutes      Pain rating (1-10) before treatment:  1/10 knee pain   Pain rating (1-10) after treatment:  no pain  0-140  ASSESSMENT:   Improved  functional knee extension with decreased pain with walking after treatment.  Tolerated balance progression and lunge progression w/o pain    PLAN/RECOMMENDATIONS:   Mbs, cupping, IAsTM DN, progressive quad and hamstring strength with ckc/okc combined  Pt. To progress sls and tandem balance on faom w/ EC as tolerated--trial run in water as tolerated--To see ortho next Monday.    "

## 2023-08-09 ENCOUNTER — PHYSICAL THERAPY (OUTPATIENT)
Dept: PHYSICAL THERAPY | Facility: REHABILITATION | Age: 58
End: 2023-08-09
Attending: SURGERY
Payer: COMMERCIAL

## 2023-08-09 DIAGNOSIS — S83.281A OTHER TEAR OF LATERAL MENISCUS, CURRENT INJURY, RIGHT KNEE, INITIAL ENCOUNTER: ICD-10-CM

## 2023-08-09 PROCEDURE — 97140 MANUAL THERAPY 1/> REGIONS: CPT

## 2023-08-09 PROCEDURE — 97110 THERAPEUTIC EXERCISES: CPT

## 2023-08-09 PROCEDURE — 97014 ELECTRIC STIMULATION THERAPY: CPT

## 2023-08-09 NOTE — OP THERAPY DAILY TREATMENT
Outpatient Physical Therapy  DAILY TREATMENT     [facility] Physical Therapy [address]  [address]  [place] [address]  Phone:  [phone]  Fax:  [phone]    Date: 08/09/2023    Patient: Melva Sauceda  YOB: 1965  MRN: [id number]     Time Calculation    Start time: 0720  Stop time: 0820 Time Calculation (min): 60 minutes         Chief Complaint: No chief complaint on file.    Visit #: 11    SUBJECTIVE:  Doing well, saw ortho and will cont. To manage conservatively      OBJECTIVE:  -0-138 r knee-no lag          Therapeutic Treatments and Modalities:     Therapeutic Treatment and Modalities Summary: Prone tke er lock-home mechanism with ART to gastroc/soleus with MWMs  Fib mobs end range  extension gd 3  ART gastroc/bicep fem  Roller bridge marching--hep  Sls wedge balance--hep progress to EC  Sls and tandem and w/ EC/EO  DN: Patient signed informed written release and verbally agreed with informed consent to procedure of dry needling   skin prep with  Chlora prep  R lateral gastroc --prox/distal to knee joint line, biceps fem.  -TENS w/ FDN    -No adverse reactions observed post treatment    Russian gastroc x 10' 5/5 mhjp with tke and mhp         Time-based treatments/modalities:    Physical Therapy Timed Treatment Charges  Manual therapy minutes (CPT 23138): 20 minutes  Therapeutic exercise minutes (CPT 31020): 20 minutes      Pain rating (1-10) before treatment:  1/10 knee pain only at end range   Pain rating (1-10) after treatment:  no pain  0-140  ASSESSMENT:   Tolerated increased activity including running slow pace for a mile--cont. To struggle with full quad contraction at TKE--improving HS strength but noted TTP BF and lateral gastroc    PLAN/RECOMMENDATIONS:   Mbs, cupping, IAsTM DN, progressive quad and hamstring strength with ckc/okc combined  Pt. To progress sls and tandem balance on faom w/ EC as tolerated--trial run in water as tolerated--To see ortho next  Monday.

## 2023-08-16 ENCOUNTER — PHYSICAL THERAPY (OUTPATIENT)
Dept: PHYSICAL THERAPY | Facility: REHABILITATION | Age: 58
End: 2023-08-16
Attending: SURGERY
Payer: COMMERCIAL

## 2023-08-16 DIAGNOSIS — R26.2 DIFFICULTY IN WALKING: ICD-10-CM

## 2023-08-16 DIAGNOSIS — S83.281A OTHER TEAR OF LATERAL MENISCUS, CURRENT INJURY, RIGHT KNEE, INITIAL ENCOUNTER: ICD-10-CM

## 2023-08-16 PROCEDURE — 97140 MANUAL THERAPY 1/> REGIONS: CPT

## 2023-08-16 PROCEDURE — 97110 THERAPEUTIC EXERCISES: CPT

## 2023-08-16 PROCEDURE — 97014 ELECTRIC STIMULATION THERAPY: CPT

## 2023-08-16 NOTE — OP THERAPY DAILY TREATMENT
"  Outpatient Physical Therapy  DAILY TREATMENT     [facility] Physical Therapy [address].  [address]  [place] [address]  Phone:  [phone]  Fax:  [phone]    Date: 08/16/2023    Patient: Melva Sauceda  YOB: 1965  MRN: [id number]     Time Calculation    Start time: 0722  Stop time: 0812 Time Calculation (min): 50 minutes         Chief Complaint: No chief complaint on file.    Visit #: 12    Good but not as good as with steroids--slight ache with sitting too long--      OBJECTIVE:  -0-135 r knee-no lag but bent knee mid stance          Therapeutic Treatments and Modalities:     Therapeutic Treatment and Modalities Summary: Prone tke er lock-home mechanism with ART to gastroc/soleus with MWMs  Fib mobs end range  extension gd 3  Seated a/p mobs gd 3-4 at end range flexion  Trx squat--slight erp--pt. Instructed NOT to progress in gym, just yet!  Rocking  hamstring recruitment at end range--hep  Sls and tandem and w/ EC/EO  Cupping to lateral joint line with stripping  Micro current (-) polarity 500ma x 15; w. Mhp0-145--erp        Time-based treatments/modalities:    Physical Therapy Timed Treatment Charges  Manual therapy minutes (CPT 83512): 20 minutes  Therapeutic exercise minutes (CPT 63392): 10 minutes  Pain rating (1-10) before treatment:  1-2/10 \" puffy weird \"  Pain rating (1-10) after treatment:  about the same  0-145  ASSESSMENT:   Tolerated increased activity including running slow pace for 2  mile--cont. To struggle with full quad contraction at TKE-noted ttp lateral joint line   PLAN/RECOMMENDATIONS:   Mbs, cupping, IAsTM DN, progressive quad and hamstring strength with ckc/okc combined  Pt. To progress sls and tandem balance on faom w/ EC as tolerated  "

## 2023-08-18 ENCOUNTER — PHYSICAL THERAPY (OUTPATIENT)
Dept: PHYSICAL THERAPY | Facility: REHABILITATION | Age: 58
End: 2023-08-18
Attending: SURGERY
Payer: COMMERCIAL

## 2023-08-18 DIAGNOSIS — M23.306 DERANGEMENT OF MENISCUS OF RIGHT KNEE: ICD-10-CM

## 2023-08-18 PROCEDURE — 97140 MANUAL THERAPY 1/> REGIONS: CPT

## 2023-08-18 PROCEDURE — 97110 THERAPEUTIC EXERCISES: CPT

## 2023-08-18 NOTE — OP THERAPY DAILY TREATMENT
"  Outpatient Physical Therapy  DAILY TREATMENT     [facility] Physical Therapy [address].  [address]  [place] [address]  Phone:  [phone]  Fax:  [phone]    Date: 08/18/2023    Patient: Melva Sauceda  YOB: 1965  MRN: [id number]     Time Calculation    Start time: 0245  Stop time: 0335 Time Calculation (min): 50 minutes         Chief Complaint: No chief complaint on file.    Visit #: 7    Ran 2 miles and riding bike consistently w/o bad pain      OBJECTIVE:  Cont. Ttp           Therapeutic Treatments and Modalities:     Therapeutic Treatment and Modalities Summary: Prone tke er lock-home mechanism with ART to gastroc/soleus with MWMs  Seated a/p mobs mid and end range flexion  Prone tke p/a mobs gd 4// less pain   Shuttle sls 1-2 bands to fatigue  S/l shuttle 2 band 4lbs ball s/l sls squats  S/l running man #3&% band--to fatigue --hep          Time-based treatments/modalities:    Physical Therapy Timed Treatment Charges  Manual therapy minutes (CPT 01202): 20 minutes  Therapeutic exercise minutes (CPT 73276): 20 minutes  Pain rating (1-10) before treatment:  1-2/10 \" tight \" can't straightne knee  Pain rating (1-10) after treatment:no pain able to stand on R l.e. w/ knee straight  0-145  ASSESSMENT:   Increaed rom w/ less pain after treatment.  Progressing well with home treatment  PLAN/RECOMMENDATIONS:   Mobs at end range--trial of vibration for end range accessory motion, IAsTM DN, progressive quad and hamstring strength with okc combined  Pt. To progress sls and tandem balance on faom w/ EC as tolerated  "

## 2023-08-23 ENCOUNTER — PHYSICAL THERAPY (OUTPATIENT)
Dept: PHYSICAL THERAPY | Facility: REHABILITATION | Age: 58
End: 2023-08-23
Attending: SURGERY
Payer: COMMERCIAL

## 2023-08-23 DIAGNOSIS — M23.306 DERANGEMENT OF MENISCUS OF RIGHT KNEE: ICD-10-CM

## 2023-08-23 PROCEDURE — 97110 THERAPEUTIC EXERCISES: CPT

## 2023-08-23 PROCEDURE — 97140 MANUAL THERAPY 1/> REGIONS: CPT

## 2023-08-23 NOTE — OP THERAPY DAILY TREATMENT
"  Outpatient Physical Therapy  DAILY TREATMENT     [facility] Physical Therapy [address].  [address]  [place] [address]  Phone:  [phone]  Fax:  [phone]    Date: 08/23/2023    Patient: Melva Sauceda  YOB: 1965  MRN: [id number]     Time Calculation                   Chief Complaint: No chief complaint on file.    Visit #: 13  A little constant ache but averall much better just reached a plateau lately-pt. Is using massage gun at home and reports that it feels good       OBJECTIVE:  Ttp: lateral jt line  Observed limited knee control with ambulation          Therapeutic Treatments and Modalities:     Therapeutic Treatment and Modalities Summary: Prone tke er lock-home mechanism with ART to gastroc/soleus with MWMs  Seated a/p mobs mid and end range flexion  Prone tke p/a mobs gd 4// less pain   Cfm lateral tib pleateau  Cup lateral joint line  Prone mre for extension   prone tke planks x 5   Tke balloon sm  ramon agaisnt wall --hep  S/l running man #3&% band--to fatigue --hep          Time-based treatments/modalities:       Pain rating (1-10) before treatment:  1-2/10 \" tight \" can't straighten knee  Pain rating (1-10) after treatment:less pain but still hard to contract quads at end range  0-145w/ 5 deg lags due to quad inhibition  ASSESSMENT:   Less pain but noted neuromotor inhibition of quads with tke, particulary in standing--noted lateral joint ttp   PLAN/RECOMMENDATIONS:   Mobs at end range--trial of vibration for end range accessory motion, IAsTM tiral dn joint lineDN??  "

## 2023-08-30 ENCOUNTER — PHYSICAL THERAPY (OUTPATIENT)
Dept: PHYSICAL THERAPY | Facility: REHABILITATION | Age: 58
End: 2023-08-30
Attending: SURGERY
Payer: COMMERCIAL

## 2023-08-30 DIAGNOSIS — M23.306 DERANGEMENT OF MENISCUS OF RIGHT KNEE: ICD-10-CM

## 2023-08-30 PROCEDURE — 97110 THERAPEUTIC EXERCISES: CPT

## 2023-08-30 PROCEDURE — 97014 ELECTRIC STIMULATION THERAPY: CPT

## 2023-08-30 PROCEDURE — 97140 MANUAL THERAPY 1/> REGIONS: CPT

## 2023-08-30 NOTE — OP THERAPY DAILY TREATMENT
"  Outpatient Physical Therapy  DAILY TREATMENT     [facility] Physical Therapy [address]  [address]  [place] [address]  Phone:  [phone]  Fax:  [phone]    Date: 08/30/2023    Patient: Melva Sauceda  YOB: 1965  MRN: [id number]     Time Calculation    Start time: 0715  Stop time: 0820 Time Calculation (min): 65 minutes         Chief Complaint: No chief complaint on file.    Visit #: 14  SUBJECTIVE:    Pretty good walked on beach and drove 8 hours with stiffness but not worse  OBJECTIVE:  Slight terminal stance bent knee          Therapeutic Treatments and Modalities:     Therapeutic Treatment and Modalities Summary: Prone tke er lock-home mechanism with ART to gastroc/soleus/popliteus with MWMs  Standing tke straddle R tke #0 challenge resisitance  Prone tke p/a mobs gd 4// less pain     Clock sls balance progression --hep  End range distraction and rotatory mobs gd 3-4 for lock home// less pain with distraction  Comoran 5/5 lateral prox gasroc and popliteus with mhp x 15;          Time-based treatments/modalities:    Physical Therapy Timed Treatment Charges  Manual therapy minutes (CPT 35373): 25 minutes  Therapeutic exercise minutes (CPT 73086): 15 minutes  Pain rating (1-10) before treatment:  just tight \" c  Pain rating (1-10) after treatment:    ASSESSMENT:   Cont. With neuromotor inhibition of quads with tke, particulary in standing--noted ttp popliteus region and lateral gastroc  Improved tolerance of end range positions with today's treatment with added joint distraction  PLAN/RECOMMENDATIONS:   Mobs at end range--trial of vibration for end range accessory motion, IAsTM tiral dn joint lineDN??  "

## 2023-09-07 ENCOUNTER — PHYSICAL THERAPY (OUTPATIENT)
Dept: PHYSICAL THERAPY | Facility: REHABILITATION | Age: 58
End: 2023-09-07
Attending: SURGERY
Payer: COMMERCIAL

## 2023-09-07 DIAGNOSIS — R26.2 DIFFICULTY IN WALKING: ICD-10-CM

## 2023-09-07 PROCEDURE — 97014 ELECTRIC STIMULATION THERAPY: CPT

## 2023-09-07 PROCEDURE — 97140 MANUAL THERAPY 1/> REGIONS: CPT

## 2023-09-07 PROCEDURE — 97110 THERAPEUTIC EXERCISES: CPT

## 2023-09-07 NOTE — OP THERAPY DAILY TREATMENT
"  Outpatient Physical Therapy  DAILY TREATMENT     [facility] Physical Therapy [address].  [address]  [place] [address]  Phone:  [phone]  Fax:  [phone]    Date: 09/07/2023    Patient: Melva Sauceda  YOB: 1965  MRN: [id number]     Time Calculation    Start time: 0713  Stop time: 0825 Time Calculation (min): 72 minutes         Chief Complaint: No chief complaint on file.    Visit #: 10  SUBJECTIVE:  Slighty worse overall at 1/10 but able run and walk without pain if intentional for a long duration slightly better with movement but ache/burn with static.  circumstanti  OBJECTIVE:  Slight terminal stance bent knee          Therapeutic Treatments and Modalities:     Therapeutic Treatment and Modalities Summary: Prone tke er lock-home mechanism with ART to gastroc/hamstring with MWMs    Prone tke p/a mobs gd 4// less pain     Clock sls balance progression --reviewed  DN: Patient signed informed written release and verbally agreed with informed consent to procedure of dry needling   skin prep with Chlora prep  -lateral hamstring, lateral gastroc   -TENS w/ FDN  -MHP x 10'  -No adverse reactions observed post treatment     Emirati 5/5 lateral prox gasroc and BF with mhp x 20;          Time-based treatments/modalities:    Physical Therapy Timed Treatment Charges  Manual therapy minutes (CPT 21287): 30 minutes  Therapeutic exercise minutes (CPT 11558): 10 minutes  Pain rating (1-10) before treatment:  just tight \" c  Pain rating (1-10) after treatment:    ASSESSMENT:   Cont. With neuromotor inhibition of quads with guarding hamstrings at tke, particulary in standing--noted ttp BF  and lateral gastroc  n  PLAN/RECOMMENDATIONS:   Mobs at end range--trial of vibration for end range accessory motion, IAsTM tiral dn joint line DN??  "

## 2023-09-13 ENCOUNTER — PHYSICAL THERAPY (OUTPATIENT)
Dept: PHYSICAL THERAPY | Facility: REHABILITATION | Age: 58
End: 2023-09-13
Attending: SURGERY
Payer: COMMERCIAL

## 2023-09-13 DIAGNOSIS — M23.306 DERANGEMENT OF MENISCUS OF RIGHT KNEE: ICD-10-CM

## 2023-09-13 DIAGNOSIS — R26.2 DIFFICULTY IN WALKING: ICD-10-CM

## 2023-09-13 PROCEDURE — 97014 ELECTRIC STIMULATION THERAPY: CPT

## 2023-09-13 NOTE — OP THERAPY DAILY TREATMENT
Outpatient Physical Therapy  DAILY TREATMENT     [facility] Physical Therapy [address].  [address]  [place] [address]  Phone:  [phone]  Fax:  [phone]    Date: 09/13/2023    Patient: Melva Sauceda  YOB: 1965  MRN: [id number]     Time Calculation    Start time: 0716  Stop time: 0800 Time Calculation (min): 44 minutes         Chief Complaint: No chief complaint on file.    Visit #: 15  SUBJECTIVE:   A little better since last visit--went for a ride on her bike outside and felt pretty good.... still shakes at end ragne extension  circumstanti  OBJECTIVE:  Slight terminal stance bent knee          Therapeutic Treatments and Modalities:     Therapeutic Treatment and Modalities Summary: Prone tke er lock-home mechanism with ART to gastroc/hamstring with MWMs    Prone tke p/a mobs gd 4// less pain     Clock sls balance progression --reviewed  DN: Patient signed informed written release and verbally agreed with informed consent to procedure of dry needling   skin prep with Chlora prep  -FL,lateral gastroc   -TENS w/ FDN    -No adverse reactions observed post treatment  Trx squats// slight tight at end range squats  Seated end range flexion a/p mobs gd 3-4// a little looser  1/2 roller bilateral a/p wt shift with active DF/PF  Sls wobble board 3-4 bands shuttle to fatigue x 2                 Time-based treatments/modalities:    Physical Therapy Timed Treatment Charges  Manual therapy minutes (CPT 35213): 15 minutes  Therapeutic exercise minutes (CPT 41681): 15 minutes  Pain rating (1-10) before treatment:    Pain rating (1-10) after treatment:    ASSESSMENT:   Cont. With neuromotor inhibition of quads with guarding hamstrings at tke, particulary in standing--noted ttp BF  and lateral gastroc  n  PLAN/RECOMMENDATIONS:   Mobs at end range--trial of vibration for end range accessory motion, IAsTM tiral dn joint line DN??

## 2023-09-20 ENCOUNTER — PHYSICAL THERAPY (OUTPATIENT)
Dept: PHYSICAL THERAPY | Facility: REHABILITATION | Age: 58
End: 2023-09-20
Attending: SURGERY
Payer: COMMERCIAL

## 2023-09-20 DIAGNOSIS — R26.2 DIFFICULTY IN WALKING: ICD-10-CM

## 2023-09-20 PROCEDURE — 97014 ELECTRIC STIMULATION THERAPY: CPT

## 2023-09-20 PROCEDURE — 97140 MANUAL THERAPY 1/> REGIONS: CPT

## 2023-09-20 NOTE — OP THERAPY DAILY TREATMENT
Outpatient Physical Therapy  DAILY TREATMENT     [facility] Physical Therapy [address]  [address]  [place] [address]  Phone:  [phone]  Fax:  [phone]    Date: 09/20/2023    Patient: Melva Sauceda  YOB: 1965  MRN: [id number]     Time Calculation    Start time: 0716  Stop time: 0800 Time Calculation (min): 44 minutes         Chief Complaint: No chief complaint on file.    Visit #: 16  SUBJECTIVE:  Better each week with patient aboe to ride w/o limit  circumstanti  OBJECTIVE:  0-136          Therapeutic Treatments and Modalities:     Therapeutic Treatment and Modalities Summary: Prone tke er lock-home mechanism with ART to gastroc/hamstring with MWMs    Prone tke p/a fib mobs with rotational moment mobs gd 4// less pain   instructed sls slight DF with slight rotational force to remodel tissue--frequent--no increased pain  Clock sls balance progression --reviewed  Cupping/stripping lateral gastroc --a/p and walking  Russian 5/5 lateral gastroc 10' mhp  Kinesion tape--lymphatic return E l.e.                   Time-based treatments/modalities:    Physical Therapy Timed Treatment Charges  Manual therapy minutes (CPT 99449): 25 minutes  Therapeutic exercise minutes (CPT 02320): 5 minutes  Pain rating (1-10) before treatment:    Pain rating (1-10) after treatment:    ASSESSMENT:   Decreased neuromotor inhibition of quads with guarding hamstrings at TKE but cont. Slight pain with o/p at end range and slight pain with loaded lock -home mechanism isolated.  No observed guarding at end range  PLAN/RECOMMENDATIONS:   Mobs at end range, progress to pt. Increaasing remodeling at end range sls with roation/loading IAsTM tiral dn joint line DN??

## 2023-09-21 ENCOUNTER — ANCILLARY PROCEDURE (OUTPATIENT)
Dept: CARDIOLOGY | Facility: MEDICAL CENTER | Age: 58
End: 2023-09-21
Attending: INTERNAL MEDICINE
Payer: COMMERCIAL

## 2023-09-21 DIAGNOSIS — C50.512 MALIGNANT NEOPLASM OF LOWER-OUTER QUADRANT OF LEFT FEMALE BREAST, UNSPECIFIED ESTROGEN RECEPTOR STATUS (HCC): ICD-10-CM

## 2023-09-21 LAB
LV EJECT FRACT  99904: 55
LV EJECT FRACT MOD 2C 99903: 66.69
LV EJECT FRACT MOD 4C 99902: 64.26
LV EJECT FRACT MOD BP 99901: 65

## 2023-09-21 PROCEDURE — 93306 TTE W/DOPPLER COMPLETE: CPT | Mod: 26 | Performed by: INTERNAL MEDICINE

## 2023-09-21 PROCEDURE — 93306 TTE W/DOPPLER COMPLETE: CPT

## 2023-09-27 ENCOUNTER — PHYSICAL THERAPY (OUTPATIENT)
Dept: PHYSICAL THERAPY | Facility: REHABILITATION | Age: 58
End: 2023-09-27
Attending: SURGERY
Payer: COMMERCIAL

## 2023-09-27 DIAGNOSIS — R26.2 DIFFICULTY IN WALKING: ICD-10-CM

## 2023-09-27 DIAGNOSIS — M23.306 DERANGEMENT OF MENISCUS OF RIGHT KNEE: ICD-10-CM

## 2023-09-27 PROCEDURE — 97140 MANUAL THERAPY 1/> REGIONS: CPT

## 2023-09-27 PROCEDURE — 97110 THERAPEUTIC EXERCISES: CPT

## 2023-09-27 PROCEDURE — 97014 ELECTRIC STIMULATION THERAPY: CPT

## 2023-09-27 NOTE — OP THERAPY DAILY TREATMENT
Outpatient Physical Therapy  DAILY TREATMENT     [facility] Physical Therapy [address].  [address]  [place] [address]  Phone:  [phone]  Fax:  [phone]    Date: 09/27/2023    Patient: Melva Sauceda  YOB: 1965  MRN: [id number]     Time Calculation    Start time: 0716  Stop time: 0812 Time Calculation (min): 56 minutes         Chief Complaint: No chief complaint on file.    Visit #: 17  SUBJECTIVE:  Slighlty better but still limited with walking --sore afterwards if for more than a mile.  Feels great on the bike  OBJECTIVE:    Ttp mid portion lateral gatroc and hamstring          Therapeutic Treatments and Modalities:     Therapeutic Treatment and Modalities Summary: Prone tke er lock-home mechanism with ART to gastroc/hamstring with MWMs  Cupping lateral gastroc/hamstring with walking and heel lifts to fatigue  IASTM : gastroc/hamstring  Russian 5/5 lateral gastroc 15' mhp  Kinesion tape--lymphatic return E l.e.                   Time-based treatments/modalities:    Physical Therapy Timed Treatment Charges  Manual therapy minutes (CPT 16528): 30 minutes  Therapeutic exercise minutes (CPT 09374): 10 minutes  Pain rating (1-10) before treatment:    Pain rating (1-10) after treatment:    ASSESSMENT:   Improving overall tolerance to walking and increased functional rom with decreased inhibition toward TKE but cont. To present with some guarding lateral gastroc  PLAN/RECOMMENDATIONS:   Hold for 3-4 week with patient progressing strength and bike at home d/c1-2 anton

## 2023-10-20 ENCOUNTER — APPOINTMENT (OUTPATIENT)
Dept: PHYSICAL THERAPY | Facility: REHABILITATION | Age: 58
End: 2023-10-20
Attending: SURGERY
Payer: COMMERCIAL

## 2023-10-25 ENCOUNTER — OFFICE VISIT (OUTPATIENT)
Dept: CARDIOLOGY | Facility: MEDICAL CENTER | Age: 58
End: 2023-10-25
Payer: COMMERCIAL

## 2023-10-25 VITALS
HEIGHT: 68 IN | SYSTOLIC BLOOD PRESSURE: 140 MMHG | WEIGHT: 170 LBS | BODY MASS INDEX: 25.76 KG/M2 | HEART RATE: 57 BPM | DIASTOLIC BLOOD PRESSURE: 82 MMHG | RESPIRATION RATE: 16 BRPM | OXYGEN SATURATION: 98 %

## 2023-10-25 DIAGNOSIS — T45.1X5A CHEMOTHERAPY INDUCED CARDIOMYOPATHY (HCC): ICD-10-CM

## 2023-10-25 DIAGNOSIS — I42.7 CHEMOTHERAPY INDUCED CARDIOMYOPATHY (HCC): ICD-10-CM

## 2023-10-25 DIAGNOSIS — I34.0 MILD MITRAL REGURGITATION: ICD-10-CM

## 2023-10-25 PROBLEM — C50.912 PRIMARY MALIGNANT NEOPLASM OF LEFT FEMALE BREAST (HCC): Status: ACTIVE | Noted: 2021-11-16

## 2023-10-25 PROCEDURE — 99211 OFF/OP EST MAY X REQ PHY/QHP: CPT

## 2023-10-25 PROCEDURE — 3077F SYST BP >= 140 MM HG: CPT

## 2023-10-25 PROCEDURE — 99214 OFFICE O/P EST MOD 30 MIN: CPT

## 2023-10-25 PROCEDURE — 3079F DIAST BP 80-89 MM HG: CPT

## 2023-10-25 RX ORDER — ANASTROZOLE 1 MG/1
1 TABLET ORAL DAILY
COMMUNITY
End: 2024-01-24

## 2023-10-25 RX ORDER — PROCHLORPERAZINE MALEATE 5 MG/1
TABLET ORAL
COMMUNITY
End: 2023-10-25

## 2023-10-25 RX ORDER — TRAMADOL HYDROCHLORIDE 50 MG/1
1 TABLET ORAL EVERY 6 HOURS PRN
COMMUNITY
End: 2023-10-25

## 2023-10-25 RX ORDER — ONDANSETRON 8 MG/1
TABLET, ORALLY DISINTEGRATING ORAL
COMMUNITY
End: 2023-10-25

## 2023-10-25 RX ORDER — GABAPENTIN 100 MG/1
CAPSULE ORAL
COMMUNITY
End: 2023-10-25

## 2023-10-25 RX ORDER — TRIAMCINOLONE ACETONIDE 1 MG/G
CREAM TOPICAL
COMMUNITY
End: 2023-10-25

## 2023-10-25 RX ORDER — CHOLECALCIFEROL (VITAMIN D3) 1250 MCG
1 CAPSULE ORAL
COMMUNITY

## 2023-10-25 RX ORDER — OXYCODONE HYDROCHLORIDE 5 MG/1
TABLET ORAL
COMMUNITY
End: 2023-10-25

## 2023-10-25 RX ORDER — LORATADINE 10 MG/1
TABLET ORAL
COMMUNITY
End: 2023-10-25

## 2023-10-25 RX ORDER — ZOLPIDEM TARTRATE 5 MG/1
TABLET ORAL
COMMUNITY
End: 2023-10-25

## 2023-10-25 RX ORDER — NAPROXEN 500 MG/1
TABLET ORAL
COMMUNITY
End: 2023-10-25

## 2023-10-25 ASSESSMENT — ENCOUNTER SYMPTOMS
GASTROINTESTINAL NEGATIVE: 1
NEUROLOGICAL NEGATIVE: 1
PALPITATIONS: 0
NERVOUS/ANXIOUS: 0
ORTHOPNEA: 0
CONSTITUTIONAL NEGATIVE: 1
MUSCULOSKELETAL NEGATIVE: 1
DEPRESSION: 0
EYES NEGATIVE: 1
PND: 0
SHORTNESS OF BREATH: 1

## 2023-10-25 ASSESSMENT — FIBROSIS 4 INDEX: FIB4 SCORE: 0.93

## 2023-10-25 NOTE — PROGRESS NOTES
Chief Complaint   Patient presents with    Other     Malignant neoplasm of left female breast, unspecified estrogen receptor status, unspecified site of breast (HCC)       Subjective     Melva Sauceda is a 58 y.o. female who presents today for follow up. 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    Seen by Dr. Hutchinson at [facility] yesterday 10/24/2023, apparently RT CARLOS did not quite meet criteria for cardiotoxicity as it was 55 and technically needs to be less than 54%, she did not have symptoms of heart failure but did have new exercise limitation.   Ordered echocardiogram with contrast     Today 10/25/2023 she does not have any significant cardiac symptoms.  Her only symptom is dyspnea on exertion.  She is very active, she has just run her 23rd marathon this year, has also run several half marathons.  But lately she has been noticing that she is getting fatigued at the 3 mile mariam which is very unusual for her    No symptoms of chest pain, palpitations, shortness of breath, exercise intolerance, or lower extremity edema.    Past Medical History:   Diagnosis Date    Cancer (HCC) 12/2021    Breast cancer - left    Cataract     H/O OU    COVID-19 06/11/2022    Detached retina, right     H/O    PONV (postoperative nausea and vomiting)     Prediabetes      Past Surgical History:   Procedure Laterality Date    PB MASTECTOMY, PARTIAL Left 7/12/2022    Procedure: LEFT ELIAN  LOCALIZED PARTIAL MASTECTOMY, LEFT SENTINEL NODE BIOPSY, POSSIBLE AXILLARY DISSECTION, BIOABSORBABLE FIDUCIAL MARKER PLACEMENT;  Surgeon: Shane Jorge M.D.;  Location: SURGERY [facility];  Service: General    NODE BIOPSY SENTINEL Left 7/12/2022    Procedure: BIOPSY, LYMPH NODE, SENTINEL;  Surgeon: Shane Jorge M.D.;  Location: [facility];  Service: General    AXILLARY NODE  DISSECTION Left 2022    Procedure: LYMPHADENECTOMY,  AXILLARY;  Surgeon: Shane Jorge M.D.;  Location: SURGERY [facility];  Service: General    NC ULTRASONIC GUIDANCE, INTRAOPERATIVE Right 2021    Procedure: ULTRASOUND GUIDANCE;  Surgeon: Shane Jorge M.D.;  Location: SURGERY [facility];  Service: General    CATH PLACEMENT Right 2021    Procedure: INSERTION, CATHETER - PORT A;  Surgeon: Shane Jorge M.D.;  Location: SURGERY [facility];  Service: General    RETINAL DETACHMENT REPAIR Right     BUNIONECTOMY Right     CATARACT EXTRACTION WITH IOL Left     GYN SURGERY           LUMPECTOMY Bilateral     >20 years    OTHER      Cataract Extraction OD     Family History   Problem Relation Age of Onset    Cancer Mother         breast and bladder    Hypertension Mother     Arrythmia Mother      Social History     Socioeconomic History    Marital status: Single     Spouse name: Not on file    Number of children: Not on file    Years of education: Not on file    Highest education level: Not on file   Occupational History    Not on file   Tobacco Use    Smoking status: Never    Smokeless tobacco: Never   Vaping Use    Vaping Use: Never used   Substance and Sexual Activity    Alcohol use: Yes     Comment: 1 drink per day    Drug use: Never    Sexual activity: Not Currently   Other Topics Concern    Not on file   Social History Narrative    Not on file     Social Determinants of Health     Financial Resource Strain: Not on file   Food Insecurity: Not on file   Transportation Needs: Not on file   Physical Activity: Not on file   Stress: Not on file   Social Connections: Not on file   Intimate Partner Violence: Not on file   Housing Stability: Not on file     No Known Allergies  Outpatient Encounter Medications as of 10/25/2023   Medication Sig Dispense Refill    anastrozole (ARIMIDEX) 1 MG Tab Take 1 mg by mouth every day.      Cholecalciferol (VITAMIN D3) 1.25 MG (68601 UT) Cap Take 1  Capsule by mouth 1 time a day as needed.      Pertuz-Trastuz-Hyaluron-zzxf (PHESGO SC) Inject 1 Syringe under the skin every 21 days.      [DISCONTINUED] gabapentin (NEURONTIN) 100 MG Cap TAKE 1 CAPSULE BY MOUTH THREE TIMES A DAY FOR 30 DAYS (Patient not taking: Reported on 10/25/2023)      [DISCONTINUED] loratadine (CLARITIN) 10 MG Tab TAKE 1 TAB BY MOUTH EVERY DAY AS NEEDED FOR RASH (Patient not taking: Reported on 10/25/2023)      [DISCONTINUED] naproxen (NAPROSYN) 500 MG Tab TAKE 1 TABLET BY MOUTH TWICE A DAY FOR 5 DAYS TAKE WITH FOOD. (Patient not taking: Reported on 10/25/2023)      [DISCONTINUED] ondansetron (ZOFRAN ODT) 8 MG TABLET DISPERSIBLE TAKE 1 TABLET BY MOUTH TWICE A DAY AS NEEDED FOR NAUSEA (Patient not taking: Reported on 10/25/2023)      [DISCONTINUED] oxyCODONE immediate-release (ROXICODONE) 5 MG Tab Take 1 tablet every 4 hours by oral route as needed. (Patient not taking: Reported on 10/25/2023)      [DISCONTINUED] prochlorperazine (COMPAZINE) 5 MG Tab TAKE 1 TABLET BY MOUTH THREE TIMES A DAY AS NEEDED FOR NAUSEA (Patient not taking: Reported on 10/25/2023)      [DISCONTINUED] traMADol (ULTRAM) 50 MG Tab Take 1 Tablet by mouth every 6 hours as needed. (Patient not taking: Reported on 10/25/2023)      [DISCONTINUED] triamcinolone acetonide (KENALOG) 0.1 % Cream APPLY A THIN LAYER TO AFFECTED AREA TWICE A DAY AS NEEDED FOR REDNESS, RASH, ITCHING (Patient not taking: Reported on 10/25/2023)      [DISCONTINUED] zolpidem (AMBIEN) 5 MG Tab TAKE 1 TABLET BY MOUTH AT BEDTIME AS NEEDED FOR INSOMIA (Patient not taking: Reported on 10/25/2023)      [DISCONTINUED] predniSONE (DELTASONE) 20 MG Tab Tab 2 po qd x 5 days (Patient not taking: Reported on 10/25/2023) 10 Tablet 0    [DISCONTINUED] hydrOXYzine HCl (ATARAX) 25 MG Tab Take 25 mg by mouth 3 times a day as needed for Anxiety.       No facility-administered encounter medications on file as of 10/25/2023.     Review of Systems   Constitutional: Negative.   "  HENT: Negative.     Eyes: Negative.    Respiratory:  Positive for shortness of breath.    Cardiovascular:  Negative for chest pain, palpitations, orthopnea, leg swelling and PND.   Gastrointestinal: Negative.    Genitourinary: Negative.    Musculoskeletal: Negative.    Skin: Negative.    Neurological: Negative.    Endo/Heme/Allergies: Negative.    Psychiatric/Behavioral:  Negative for depression. The patient is not nervous/anxious.               Objective     BP (!) 140/82 (BP Location: Right arm, Patient Position: Sitting, BP Cuff Size: Adult)   Pulse (!) 57   Resp 16   Ht 1.727 m (5' 8\")   Wt 77.1 kg (170 lb)   SpO2 98%   BMI 25.85 kg/m²     Physical Exam  Constitutional:       Appearance: Normal appearance.   HENT:      Head: Normocephalic and atraumatic.   Neck:      Vascular: No JVD.   Cardiovascular:      Rate and Rhythm: Regular rhythm. Bradycardia present.      Pulses: Normal pulses.      Heart sounds: Normal heart sounds. No murmur heard.     No friction rub.   Pulmonary:      Effort: Pulmonary effort is normal. No respiratory distress.      Breath sounds: Normal breath sounds.   Abdominal:      General: There is no distension.      Palpations: Abdomen is soft.   Musculoskeletal:      Right lower leg: No edema.      Left lower leg: No edema.   Skin:     General: Skin is warm and dry.   Neurological:      General: No focal deficit present.      Mental Status: She is alert and oriented to person, place, and time.   Psychiatric:         Mood and Affect: Mood normal.         Behavior: Behavior normal.            Lab Results   Component Value Date/Time    CHOLSTRLTOT 222 (H) 11/19/2021 07:25 AM     (H) 11/19/2021 07:25 AM    HDL 86 11/19/2021 07:25 AM    TRIGLYCERIDE 66 11/19/2021 07:25 AM       Lab Results   Component Value Date/Time    SODIUM 141 05/22/2023 03:00 PM    POTASSIUM 4.4 05/22/2023 03:00 PM    CHLORIDE 106 05/22/2023 03:00 PM    CO2 24 05/22/2023 03:00 PM    GLUCOSE 108 (H) " 05/22/2023 03:00 PM    BUN 19 05/22/2023 03:00 PM    CREATININE 0.60 05/22/2023 03:00 PM     Lab Results   Component Value Date/Time    ALKPHOSPHAT 58 05/22/2023 03:00 PM    ASTSGOT 17 05/22/2023 03:00 PM    ALTSGPT 17 05/22/2023 03:00 PM    TBILIRUBIN 0.2 05/22/2023 03:00 PM      Echocardiogram 9/21/2023  CONCLUSIONS  Compared to the prior study on 06/23/23.      The ejection fraction is estimated to be 55%.  Normal right ventricular size and systolic function.  Mild mitral regurgitation.  Estimated right ventricular systolic pressure is 20 mmHg.    Assessment & Plan     1. Chemotherapy induced cardiomyopathy (HCC)  NM-CARDIAC STRESS TEST    Lipid Profile    HEMOGLOBIN A1C (Glycohemoglobin GHB Total/A1C with MBG Estimate)    CT-CTA HEART W/3D IMAGE    REFERRAL TO CARDIOLOGY    EC-ECHOCARDIOGRAM COMPLETE W/ CONT      2. Mild mitral regurgitation            Medical Decision Making: Today's Assessment/Status/Plan:        Chemotherapy induced cardiomyopathy  -Had been treated with trastuzumab  -High reduction in her EF down to 55%, does not exactly meet the criteria of less than 54%  -Only symptom is MONSON, see HPI  -Follows with provider at [facility]  -Wanting to get cardiac evaluation done here locally  -Order placed for echo with contrast, treadmill stress test, CTA heart, lipid panel and hemoglobin A1c  -We will send her images to [facility] after they are obtained  -Blood pressure is somewhat elevated at this appointment, her provider in [facility] had talked about starting lisinopril but wanted to wait until after imaging was done    Mild mitral regurgitation  -Monitor with echo every 3-5 years    Follow-up with heart failure clinic with Dr. Lee    This note was dictated using Dragon speech recognition software.

## 2023-10-26 ENCOUNTER — HOSPITAL ENCOUNTER (OUTPATIENT)
Dept: CARDIOLOGY | Facility: MEDICAL CENTER | Age: 58
End: 2023-10-26
Payer: COMMERCIAL

## 2023-10-26 ENCOUNTER — PHARMACY VISIT (OUTPATIENT)
Dept: PHARMACY | Facility: MEDICAL CENTER | Age: 58
End: 2023-10-26
Payer: COMMERCIAL

## 2023-10-26 DIAGNOSIS — T45.1X5A CHEMOTHERAPY INDUCED CARDIOMYOPATHY (HCC): ICD-10-CM

## 2023-10-26 DIAGNOSIS — I42.7 CHEMOTHERAPY INDUCED CARDIOMYOPATHY (HCC): ICD-10-CM

## 2023-10-26 PROCEDURE — 93306 TTE W/DOPPLER COMPLETE: CPT

## 2023-10-26 PROCEDURE — 700117 HCHG RX CONTRAST REV CODE 255

## 2023-10-26 PROCEDURE — RXMED WILLOW AMBULATORY MEDICATION CHARGE: Performed by: INTERNAL MEDICINE

## 2023-10-26 RX ORDER — ANASTROZOLE 1 MG/1
TABLET ORAL
Qty: 90 TABLET | Refills: 0 | Status: SHIPPED | OUTPATIENT
Start: 2023-10-26 | End: 2024-01-21 | Stop reason: SDUPTHER

## 2023-10-26 RX ADMIN — HUMAN ALBUMIN MICROSPHERES AND PERFLUTREN 3 ML: 10; .22 INJECTION, SOLUTION INTRAVENOUS at 17:45

## 2023-10-27 ENCOUNTER — PHYSICAL THERAPY (OUTPATIENT)
Dept: PHYSICAL THERAPY | Facility: REHABILITATION | Age: 58
End: 2023-10-27
Attending: SURGERY
Payer: COMMERCIAL

## 2023-10-27 DIAGNOSIS — I89.0 LYMPHEDEMA: ICD-10-CM

## 2023-10-27 LAB
LV EJECT FRACT  99904: 68
LV EJECT FRACT MOD 2C 99903: 70.66
LV EJECT FRACT MOD 4C 99902: 67.24
LV EJECT FRACT MOD BP 99901: 68.07

## 2023-10-27 PROCEDURE — 97110 THERAPEUTIC EXERCISES: CPT

## 2023-10-27 PROCEDURE — 93306 TTE W/DOPPLER COMPLETE: CPT | Mod: 26 | Performed by: INTERNAL MEDICINE

## 2023-10-27 NOTE — OP THERAPY DAILY TREATMENT
Outpatient Physical Therapy  DAILY TREATMENT     [facility] Physical Therapy [address]  [address]  [place] [address]  Phone:  [phone]  Fax:  [phone]    Date: 10/27/2023    Patient: Melva Sauceda  YOB: 1965  MRN: [id number]     Time Calculation    Start time: 0725  Stop time: 0750 Time Calculation (min): 25 minutes         Chief Complaint: No chief complaint on file.    Visit #: 14  SUBJECTIVE:  Slighlty better but still limited with walking --sore afterwards if for more than a mile.  Feels great on the bike  OBJECTIVE:    Ttp mid portion lateral gatroc and hamstring          Therapeutic Treatments and Modalities:     Therapeutic Treatment and Modalities Summary: Prone ART wit popliteus hold relax  Nerve glides/tension supine/sitting/stand// maybe straighter with some ant. Knee pain                   Time-based treatments/modalities:    Physical Therapy Timed Treatment Charges  Therapeutic exercise minutes (CPT 20018): 23 minutes  Pain rating (1-10) before treatment:    Pain rating (1-10) after treatment:    ASSESSMENT:   Patient reports slight improvement with motion but still reports pain with tke   Noed increased tke in standing anfter nerve tension/glides  PLAN/RECOMMENDATIONS:   Hold for 2-3 week with patient progressing strength   Progress neural tension

## 2023-10-30 ENCOUNTER — TELEPHONE (OUTPATIENT)
Dept: CARDIOLOGY | Facility: MEDICAL CENTER | Age: 58
End: 2023-10-30

## 2023-10-30 NOTE — TELEPHONE ENCOUNTER
Caller: Melva Sauceda     Topic/issue: A low radiation coronary artery sheet form was requested by Imaging ,  re faxed to [phone] on 10/30/23.    Patient also would like to request a stat order.    Patient wanted to make sure DR Loza was on her list of providers receiving her testing Cancer care specialists  [address] [place] [address] [phone]    Callback Number: [phone]      Thank you   Dana PETER

## 2023-11-01 ENCOUNTER — HOSPITAL ENCOUNTER (OUTPATIENT)
Dept: RADIOLOGY | Facility: MEDICAL CENTER | Age: 58
End: 2023-11-01
Payer: COMMERCIAL

## 2023-11-01 ENCOUNTER — APPOINTMENT (OUTPATIENT)
Dept: CARDIOLOGY | Facility: MEDICAL CENTER | Age: 58
End: 2023-11-01
Attending: INTERNAL MEDICINE
Payer: COMMERCIAL

## 2023-11-01 DIAGNOSIS — I42.7 CHEMOTHERAPY INDUCED CARDIOMYOPATHY (HCC): ICD-10-CM

## 2023-11-01 DIAGNOSIS — T45.1X5A CHEMOTHERAPY INDUCED CARDIOMYOPATHY (HCC): ICD-10-CM

## 2023-11-03 ENCOUNTER — HOSPITAL ENCOUNTER (OUTPATIENT)
Dept: RADIOLOGY | Facility: MEDICAL CENTER | Age: 58
End: 2023-11-03
Payer: COMMERCIAL

## 2023-11-03 ENCOUNTER — TELEPHONE (OUTPATIENT)
Dept: CARDIOLOGY | Facility: MEDICAL CENTER | Age: 58
End: 2023-11-03

## 2023-11-03 ENCOUNTER — HOSPITAL ENCOUNTER (OUTPATIENT)
Dept: LAB | Facility: MEDICAL CENTER | Age: 58
End: 2023-11-03
Payer: COMMERCIAL

## 2023-11-03 DIAGNOSIS — T45.1X5A CHEMOTHERAPY INDUCED CARDIOMYOPATHY (HCC): ICD-10-CM

## 2023-11-03 DIAGNOSIS — I42.7 CHEMOTHERAPY INDUCED CARDIOMYOPATHY (HCC): ICD-10-CM

## 2023-11-03 LAB
CHOLEST SERPL-MCNC: 236 MG/DL (ref 100–199)
EST. AVERAGE GLUCOSE BLD GHB EST-MCNC: 108 MG/DL
HBA1C MFR BLD: 5.4 % (ref 4–5.6)
HDLC SERPL-MCNC: 76 MG/DL
LDLC SERPL CALC-MCNC: 140 MG/DL
TRIGL SERPL-MCNC: 101 MG/DL (ref 0–149)

## 2023-11-03 PROCEDURE — 78452 HT MUSCLE IMAGE SPECT MULT: CPT | Mod: 26 | Performed by: INTERNAL MEDICINE

## 2023-11-03 PROCEDURE — 93018 CV STRESS TEST I&R ONLY: CPT | Performed by: INTERNAL MEDICINE

## 2023-11-03 PROCEDURE — 36415 COLL VENOUS BLD VENIPUNCTURE: CPT

## 2023-11-03 PROCEDURE — 80061 LIPID PANEL: CPT

## 2023-11-03 PROCEDURE — 78452 HT MUSCLE IMAGE SPECT MULT: CPT

## 2023-11-03 PROCEDURE — 83036 HEMOGLOBIN GLYCOSYLATED A1C: CPT

## 2023-11-04 ENCOUNTER — PATIENT MESSAGE (OUTPATIENT)
Dept: CARDIOLOGY | Facility: MEDICAL CENTER | Age: 58
End: 2023-11-04
Payer: COMMERCIAL

## 2023-11-06 ENCOUNTER — PHARMACY VISIT (OUTPATIENT)
Dept: PHARMACY | Facility: MEDICAL CENTER | Age: 58
End: 2023-11-06
Payer: COMMERCIAL

## 2023-11-06 PROCEDURE — RXMED WILLOW AMBULATORY MEDICATION CHARGE

## 2023-11-06 RX ORDER — GABAPENTIN 100 MG/1
CAPSULE ORAL
Qty: 60 CAPSULE | Refills: 0 | Status: SHIPPED | OUTPATIENT
Start: 2023-11-03 | End: 2024-01-24

## 2023-11-06 NOTE — PROGRESS NOTES
Request received to review order/ protocol for coronary CTA. Scan approved. Upon review of available medical records, no additional orders have been placed.    This CT study may be scheduled through [facility] Imaging Scheduling at , option 2.

## 2023-11-07 ENCOUNTER — TELEPHONE (OUTPATIENT)
Dept: CARDIOLOGY | Facility: MEDICAL CENTER | Age: 58
End: 2023-11-07
Payer: COMMERCIAL

## 2023-11-07 NOTE — TELEPHONE ENCOUNTER
----- Message from LISANDRO Corona sent at 11/7/2023 12:44 PM PST -----  Please forward her results and images to Dr. Hutchinson at [facility]

## 2023-11-13 ENCOUNTER — HOSPITAL ENCOUNTER (OUTPATIENT)
Dept: RADIOLOGY | Facility: MEDICAL CENTER | Age: 58
End: 2023-11-13
Payer: COMMERCIAL

## 2023-11-13 VITALS
DIASTOLIC BLOOD PRESSURE: 75 MMHG | WEIGHT: 165 LBS | HEART RATE: 54 BPM | SYSTOLIC BLOOD PRESSURE: 139 MMHG | BODY MASS INDEX: 25.01 KG/M2 | OXYGEN SATURATION: 95 % | TEMPERATURE: 98 F | HEIGHT: 68 IN | RESPIRATION RATE: 14 BRPM

## 2023-11-13 DIAGNOSIS — I42.7 CHEMOTHERAPY INDUCED CARDIOMYOPATHY (HCC): ICD-10-CM

## 2023-11-13 DIAGNOSIS — T45.1X5A CHEMOTHERAPY INDUCED CARDIOMYOPATHY (HCC): ICD-10-CM

## 2023-11-13 PROCEDURE — 700102 HCHG RX REV CODE 250 W/ 637 OVERRIDE(OP): Performed by: RADIOLOGY

## 2023-11-13 PROCEDURE — A9270 NON-COVERED ITEM OR SERVICE: HCPCS | Performed by: RADIOLOGY

## 2023-11-13 PROCEDURE — 700117 HCHG RX CONTRAST REV CODE 255

## 2023-11-13 PROCEDURE — 75574 CT ANGIO HRT W/3D IMAGE: CPT

## 2023-11-13 PROCEDURE — 700111 HCHG RX REV CODE 636 W/ 250 OVERRIDE (IP): Performed by: RADIOLOGY

## 2023-11-13 RX ORDER — NITROGLYCERIN 0.4 MG/1
0.4 TABLET SUBLINGUAL ONCE
Status: COMPLETED | OUTPATIENT
Start: 2023-11-13 | End: 2023-11-13

## 2023-11-13 RX ADMIN — HEPARIN 500 UNITS: 100 SYRINGE at 16:03

## 2023-11-13 RX ADMIN — NITROGLYCERIN 0.4 MG: 0.4 TABLET, ORALLY DISINTEGRATING SUBLINGUAL at 15:23

## 2023-11-13 RX ADMIN — IOHEXOL 100 ML: 350 INJECTION, SOLUTION INTRAVENOUS at 12:15

## 2023-11-13 ASSESSMENT — FIBROSIS 4 INDEX: FIB4 SCORE: 0.93

## 2023-11-14 NOTE — PROGRESS NOTES
MRI Nursing Note:  Following CTA, VSS, Port flushed with heparin, de-accessed and site covered with a band aid. Discharge instructions discussed and understanding verbalized. Regina discharged to a responsible adult .

## 2023-11-15 ENCOUNTER — TELEPHONE (OUTPATIENT)
Dept: CARDIOLOGY | Facility: MEDICAL CENTER | Age: 58
End: 2023-11-15
Payer: COMMERCIAL

## 2023-11-15 ENCOUNTER — PHYSICAL THERAPY (OUTPATIENT)
Dept: PHYSICAL THERAPY | Facility: REHABILITATION | Age: 58
End: 2023-11-15
Attending: SURGERY
Payer: COMMERCIAL

## 2023-11-15 DIAGNOSIS — R26.2 DIFFICULTY IN WALKING: ICD-10-CM

## 2023-11-15 PROCEDURE — 97110 THERAPEUTIC EXERCISES: CPT

## 2023-11-15 NOTE — OP THERAPY DAILY TREATMENT
"  Outpatient Physical Therapy  DAILY TREATMENT     [facility] Physical Therapy [address].  [address]  [place] [address]  Phone:  [phone]  Fax:  [phone]    Date: 11/15/2023    Patient: Melva Sauceda  YOB: 1965  MRN: [id number]     Time Calculation    Start time: 0716  Stop time: 0800 Time Calculation (min): 44 minutes         Chief Complaint: No chief complaint on file.    Visit #: 15  SUBJECTIVE:  oVerall little pain running 4/wk 3-5 miles--noted limits with ginette shoes  OBJECTIVE:  TIOP tier:limited squat, hip ext, rotation R  Ttp mid portion lateral gatroc and hamstring  2.5 \"          Therapeutic Treatments and Modalities:     Therapeutic Treatment and Modalities Summary: Tandem black bosu wt shift--R leg back more diff.  Bilateral stance blk at. Wt shift--tke--hep  Knee to wall stretch R knee  4x4 rotation  -supine arms out with straight leg & bent knee trunk rotation w/ focus on scap retraction  -prone rotation--bottom up rotation  Trx squats--focus on ant tib translation--hep  Prone tke er lock-home mechanism with ART to gastroc/hamstring with MWMs  Cupping lateral gastroc/hamstring with walking and heel lifts to fatigue                     Time-based treatments/modalities:    Physical Therapy Timed Treatment Charges  Therapeutic exercise minutes (CPT 90805): 38 minutes  Pain rating (1-10) before treatment:    Pain rating (1-10) after treatment:  0-136  ASSESSMENT:   Improving overall tolerance to running and increased functional rom with progressing well --noted limits with ant. Tib translation and tight soleus  recomMENDATIONS:  Hold for 3 week with patient progressing strength and bike at home d/c1-2 vistis  "

## 2023-11-15 NOTE — TELEPHONE ENCOUNTER
Call outcome:  spoke to [facility]    Message: spoke to representative who reached out to office to confirm that imaging/labs were received. Informed that response could take 1-2 business days.

## 2023-11-15 NOTE — TELEPHONE ENCOUNTER
----- Message from LISANDRO Corona sent at 11/14/2023  4:43 PM PST -----  Can you please make sure that all of her images got sent over to [facility]?

## 2023-11-29 ENCOUNTER — HOSPITAL ENCOUNTER (EMERGENCY)
Facility: MEDICAL CENTER | Age: 58
End: 2023-11-29
Attending: STUDENT IN AN ORGANIZED HEALTH CARE EDUCATION/TRAINING PROGRAM
Payer: COMMERCIAL

## 2023-11-29 VITALS
HEIGHT: 68 IN | BODY MASS INDEX: 26.3 KG/M2 | TEMPERATURE: 98 F | DIASTOLIC BLOOD PRESSURE: 82 MMHG | HEART RATE: 74 BPM | RESPIRATION RATE: 16 BRPM | WEIGHT: 173.5 LBS | SYSTOLIC BLOOD PRESSURE: 149 MMHG | OXYGEN SATURATION: 96 %

## 2023-11-29 DIAGNOSIS — L29.9 PRURITUS: ICD-10-CM

## 2023-11-29 PROCEDURE — 700102 HCHG RX REV CODE 250 W/ 637 OVERRIDE(OP): Performed by: STUDENT IN AN ORGANIZED HEALTH CARE EDUCATION/TRAINING PROGRAM

## 2023-11-29 PROCEDURE — 99283 EMERGENCY DEPT VISIT LOW MDM: CPT

## 2023-11-29 PROCEDURE — A9270 NON-COVERED ITEM OR SERVICE: HCPCS | Performed by: STUDENT IN AN ORGANIZED HEALTH CARE EDUCATION/TRAINING PROGRAM

## 2023-11-29 RX ORDER — FAMOTIDINE 20 MG/1
20 TABLET, FILM COATED ORAL ONCE
Status: COMPLETED | OUTPATIENT
Start: 2023-11-29 | End: 2023-11-29

## 2023-11-29 RX ORDER — DEXAMETHASONE 4 MG/1
16 TABLET ORAL ONCE
Status: COMPLETED | OUTPATIENT
Start: 2023-11-29 | End: 2023-11-29

## 2023-11-29 RX ORDER — CETIRIZINE HYDROCHLORIDE 10 MG/1
10 TABLET ORAL ONCE
Status: COMPLETED | OUTPATIENT
Start: 2023-11-29 | End: 2023-11-29

## 2023-11-29 RX ORDER — FAMOTIDINE 40 MG/1
20 TABLET, FILM COATED ORAL DAILY
Qty: 15 TABLET | Refills: 0 | Status: SHIPPED | OUTPATIENT
Start: 2023-11-29 | End: 2023-12-29

## 2023-11-29 RX ADMIN — FAMOTIDINE 20 MG: 20 TABLET, FILM COATED ORAL at 17:44

## 2023-11-29 RX ADMIN — DEXAMETHASONE 16 MG: 4 TABLET ORAL at 17:43

## 2023-11-29 RX ADMIN — CETIRIZINE HYDROCHLORIDE 10 MG: 10 TABLET, FILM COATED ORAL at 17:43

## 2023-11-29 ASSESSMENT — FIBROSIS 4 INDEX: FIB4 SCORE: 0.93

## 2023-11-30 NOTE — ED PROVIDER NOTES
ED Provider Note    CHIEF COMPLAINT  Chief Complaint   Patient presents with    Other     Pt reports that she for the last few months has been having burning and itching over different parts of her body  Pt states that ICE helps w/ the pain  Pt was being followed by her oncologist and started on several things with no improvement        EXTERNAL RECORDS REVIEWED  Outpatient Notes urgent care visit from 7/2023 patient was seen for itchy rash advised to initiate antihistamines    HPI/ROS  LIMITATION TO HISTORY     OUTSIDE HISTORIAN(S):      Melva Sauceda is a 58 y.o. female who presents with pruritus.  Patient says for months she has been having itchy rash across her shoulders and arms.  Patient says that she has tried Benadryl, oral steroids, topical steroids, gabapentin without relief.  Patient says she is spoken with her oncologist who recommended she follow-up with dermatologist who she has an appointment with in January.  Patient says that itching is become more severe she is having trouble sleeping.  Patient denies fever, chills, chest pain, shortness of breath, nausea, vomiting, diarrhea, abdominal pain, sore throat, oral lesions.  Patient denies drug or alcohol use.  Patient says she has been using the same moisturizers, soaps and legs present for years.    PAST MEDICAL HISTORY   has a past medical history of Cancer (HCC) (12/2021), Cataract, COVID-19 (06/11/2022), Detached retina, right, PONV (postoperative nausea and vomiting), and Prediabetes.    SURGICAL HISTORY   has a past surgical history that includes bunionectomy (Right); ultrasonic guidance, intraoperative (Right, 12/31/2021); cath placement (Right, 12/31/2021); cataract extraction with iol (Left); gyn surgery; lumpectomy (Bilateral); other; retinal detachment repair (Right, 2014); mastectomy, partial (Left, 7/12/2022); node biopsy sentinel (Left, 7/12/2022); and axillary node dissection (Left, 7/12/2022).    FAMILY HISTORY  Family History  "  Problem Relation Age of Onset    Cancer Mother         breast and bladder    Hypertension Mother     Arrythmia Mother        SOCIAL HISTORY  Social History     Tobacco Use    Smoking status: Never    Smokeless tobacco: Never   Vaping Use    Vaping Use: Never used   Substance and Sexual Activity    Alcohol use: Yes     Comment: 1 drink per day    Drug use: Never    Sexual activity: Not Currently       CURRENT MEDICATIONS  Home Medications    **Home medications have not yet been reviewed for this encounter**         ALLERGIES  No Known Allergies    PHYSICAL EXAM  VITAL SIGNS: BP (!) 199/81   Pulse 74   Temp 36.9 °C (98.5 °F) (Temporal)   Resp 16   Ht 1.727 m (5' 8\")   Wt 78.7 kg (173 lb 8 oz)   SpO2 96%   BMI 26.38 kg/m²    Constitutional: Alert in no apparent distress.  HENT: No signs of trauma, Bilateral external ears normal, Nose normal.   Eyes: Pupils are equal and reactive, Conjunctiva normal, Non-icteric.   Neck: Normal range of motion, No tenderness, Supple, No stridor.   Cardiovascular: Regular rate and rhythm, no murmurs.   Thorax & Lungs: Normal breath sounds, No respiratory distress, No wheezing, No chest tenderness.   Abdomen: Bowel sounds normal, Soft, No tenderness, No masses, No pulsatile masses.   Skin: Warm, Dry, No erythema, slight area of erythema across bilateral shoulders and area where patient has originally been itching, scattered excoriations across bilateral upper arms where patient says she has also been itching, no pustular lesions, crepitus, tenderness  Back: No bony tenderness, No CVA tenderness.   Extremities: Intact distal pulses, No edema, No tenderness, No cyanosis  Musculoskeletal: Good range of motion in all major joints. No tenderness to palpation or major deformities noted.   Neurologic: Alert , Normal motor function, Normal sensory function, No focal deficits noted.       DIAGNOSTIC STUDIES / PROCEDURES  EKG      LABS      RADIOLOGY      COURSE & MEDICAL DECISION " MAKING    ED Observation Status?     INITIAL ASSESSMENT, COURSE AND PLAN  Care Narrative: Patient noted be hypertensive otherwise no vital sign normalities.  No history exam to suggest anaphylaxis, acute infectious rash, SJS or TE N.  Patient is systemically well has been eating and drinking normally does not appear dehydrated no cardiopulmonary or abdominal symptoms.  Discussed with patient elimination of any moisturizers, soaps with fragrances, oils or dyes.  Discussed with her transitioning to hypoallergenic laundry detergent and elimination diet.  Discussed with her restarting gabapentin, double dose of Zyrtec and initiation of famotidine.  Will provide patient with single dose of Decadron.  Encourage patient to discuss with her oncologist the possibility of effects of her chemotherapy regimen.  Patient does have regular blood work without any reported abnormalities.  No signs of liver disease or renal failure.  Patient understanding of return precautions, supportive care at home and follow-up with oncologist and dermatologist for        ADDITIONAL PROBLEM LIST    DISPOSITION AND DISCUSSIONS    FINAL DIAGNOSIS  1. Pruritus           Electronically signed by: [place] D.O., 11/29/2023 5:43 PM

## 2023-11-30 NOTE — DISCHARGE INSTRUCTIONS
As we discussed you can double your dose of Zyrtec.  We have given you a prescription for famotidine.  You should restart  your gabapentin.  You should stop using any sort of creams, soaps containing fragrances, dyes or oils.  You can use Cetaphil products.  Consider an elimination diet as we discussed.  You should also transition to hypoallergenic laundry detergent.  Follow-up with your oncologist regarding possible effects of chemotherapy drugs and your current symptoms.  You develop fever, difficulty breathing, vomiting, passing out you should return to the emergency room.

## 2023-11-30 NOTE — ED NOTES
Discharge instructions given and discussed. RX for pepcid given and pt educated. Pt educated to come back to ER for new or worsening symptoms and follow up with PCP as instructed. Pt verbalized understanding. Pt  Discharged in stable condition.

## 2023-11-30 NOTE — ED TRIAGE NOTES
"Chief Complaint   Patient presents with    Other     Pt reports that she for the last few months has been having burning and itching over different parts of her body  Pt states that ICE helps w/ the pain  Pt was being followed by her oncologist and started on several things with no improvement      Pulse 74   Temp 36.9 °C (98.5 °F) (Temporal)   Resp 16   Ht 1.727 m (5' 8\")   Wt 78.7 kg (173 lb 8 oz)   SpO2 96%   BMI 26.38 kg/m²     "

## 2023-12-01 ENCOUNTER — PHYSICAL THERAPY (OUTPATIENT)
Dept: PHYSICAL THERAPY | Facility: REHABILITATION | Age: 58
End: 2023-12-01
Attending: SURGERY
Payer: COMMERCIAL

## 2023-12-01 DIAGNOSIS — R26.2 DIFFICULTY IN WALKING: ICD-10-CM

## 2023-12-01 DIAGNOSIS — M23.306 DERANGEMENT OF MENISCUS OF RIGHT KNEE: ICD-10-CM

## 2023-12-01 PROCEDURE — 97110 THERAPEUTIC EXERCISES: CPT

## 2023-12-01 NOTE — OP THERAPY DISCHARGE SUMMARY
Outpatient Physical Therapy  DISCHARGE SUMMARY NOTE      [facility] Physical Therapy [address]  [address]  [place] [address]  Phone:  [phone]  Fax:  [phone]    Date of Visit: 12/01/2023    Patient: Melva Sauceda  YOB: 1965  MRN: [id number]     Referring Provider: Alonso Adams M.D.  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 Reports that she Ran 5 k w/ 2 hrs of sleep and felt great. No real limits still pain at end range  OBJECTIVE:    0-136  ASSESSMENT:   LEFS 80/80  Ran a 5k at 9-930 pace without pain.  All goals met  recomMENDATIONS:  D.c to and independent hep    Perez Stone, PT, DPT, OCS    Date: 12/1/2023

## 2023-12-01 NOTE — OP THERAPY DAILY TREATMENT
"  Outpatient Physical Therapy  DAILY TREATMENT     [facility] Physical Therapy [address]  [address]  [place] [address]  Phone:  [phone]  Fax:  [phone]    Date: 12/01/2023    Patient: Melva Sauceda  YOB: 1965  MRN: [id number]     Time Calculation    Start time: 0200  Stop time: 0215 Time Calculation (min): 15 minutes         Chief Complaint: No chief complaint on file.    Visit #: 16  SUBJECTIVE  Ran 5 k w/ 2 hrs of sleep and felt great. No real limits still pain at end range  OBJECTIVE:  TIOP tier:limited squat, hip ext, rotation R  Ttp mid portion lateral gatroc and hamstring  2.5 \"          Therapeutic Treatments and Modalities:     Therapeutic Treatment and Modalities Summary: Reviewed hep progression  Discussed dynadisc for hep                     Time-based treatments/modalities:    Physical Therapy Timed Treatment Charges  Therapeutic exercise minutes (CPT 60388): 10 minutes  Pain rating (1-10) before treatment:    Pain rating (1-10) after treatment:  0-136  ASSESSMENT:   LEFS 80/80  Ran a 5k at 9-930 pace without pain.  All goals met  recomMENDATIONS:  D.c to and independent hep  "

## 2023-12-05 ENCOUNTER — TELEPHONE (OUTPATIENT)
Dept: CARDIOLOGY | Facility: MEDICAL CENTER | Age: 58
End: 2023-12-05
Payer: COMMERCIAL

## 2023-12-05 NOTE — TELEPHONE ENCOUNTER
Phone Number Called: [phone]    Call outcome:  left message to call RN back     Message: Left message with [facility] cardiology to have James call this RN back.     Phone Number Called: [phone]    Call outcome:  spoke to film room     Message: Images (CTA heart, stress test, and echo) sent over to [facility] at this time

## 2023-12-05 NOTE — TELEPHONE ENCOUNTER
Call outcome:  spoke to James with [facility]    Message: James informed this RN that images have not yet been received. This RN had images resent via film room. This RN also sent imaging reports via fax at this time.     James still has not received but once he has he will call this RN back to confirm.

## 2023-12-05 NOTE — TELEPHONE ENCOUNTER
Caller: James with [facility]    Topic/issue:  Jaems states the pt informed him that  was to send over some images to their office. They have not received anything for her and he is not sure what they are for.     Callback Number: James ph: [phone]  Fax: [phone]     Thank you  Elizabeth OCASIO

## 2024-01-09 ENCOUNTER — APPOINTMENT (RX ONLY)
Dept: URBAN - METROPOLITAN AREA CLINIC 4 | Facility: CLINIC | Age: 59
Setting detail: DERMATOLOGY
End: 2024-01-09

## 2024-01-09 ENCOUNTER — PHARMACY VISIT (OUTPATIENT)
Dept: PHARMACY | Facility: MEDICAL CENTER | Age: 59
End: 2024-01-09
Payer: COMMERCIAL

## 2024-01-09 DIAGNOSIS — R20.8 OTHER DISTURBANCES OF SKIN SENSATION: ICD-10-CM

## 2024-01-09 PROCEDURE — 99204 OFFICE O/P NEW MOD 45 MIN: CPT

## 2024-01-09 PROCEDURE — RXMED WILLOW AMBULATORY MEDICATION CHARGE: Performed by: PHYSICIAN ASSISTANT

## 2024-01-09 PROCEDURE — ? ADDITIONAL NOTES

## 2024-01-09 PROCEDURE — ? PRESCRIPTION

## 2024-01-09 PROCEDURE — ? ORDER TESTS

## 2024-01-09 PROCEDURE — ? COUNSELING

## 2024-01-09 RX ORDER — GABAPENTIN 300 MG/1
CAPSULE ORAL
Qty: 90 | Refills: 0 | Status: ERX | COMMUNITY
Start: 2024-01-09

## 2024-01-09 RX ORDER — LEVOCETIRIZINE DIHYDROCHLORIDE 5 MG
1 TABLET ORAL QHS
Qty: 30 | Refills: 0 | Status: ERX | COMMUNITY
Start: 2024-01-09

## 2024-01-09 RX ORDER — LEVOCETIRIZINE DIHYDROCHLORIDE 5 MG/1
TABLET, FILM COATED ORAL
Qty: 30 TABLET | Refills: 0 | OUTPATIENT
Start: 2024-01-09

## 2024-01-09 RX ORDER — GABAPENTIN 100 MG/1
1 CAPSULE ORAL QD
Qty: 90 | Refills: 0 | Status: CANCELLED

## 2024-01-09 RX ORDER — CLOBETASOL PROPIONATE 0.5 MG/G
CREAM TOPICAL BID
Qty: 30 | Refills: 0 | Status: ERX | COMMUNITY
Start: 2024-01-09

## 2024-01-09 RX ORDER — CLOBETASOL PROPIONATE 0.5 MG/G
CREAM TOPICAL
Qty: 30 G | Refills: 0 | OUTPATIENT
Start: 2024-01-09

## 2024-01-09 RX ORDER — GABAPENTIN 300 MG/1
CAPSULE ORAL
Qty: 90 CAPSULE | Refills: 0 | OUTPATIENT
Start: 2024-01-09

## 2024-01-09 RX ADMIN — CLOBETASOL PROPIONATE: 0.5 CREAM TOPICAL at 00:00

## 2024-01-09 RX ADMIN — GABAPENTIN: 300 CAPSULE ORAL at 00:00

## 2024-01-09 RX ADMIN — Medication 1: at 00:00

## 2024-01-09 ASSESSMENT — LOCATION DETAILED DESCRIPTION DERM
LOCATION DETAILED: RIGHT INFERIOR LATERAL NECK
LOCATION DETAILED: LEFT ANTERIOR SHOULDER
LOCATION DETAILED: RIGHT DORSAL WRIST

## 2024-01-09 ASSESSMENT — LOCATION ZONE DERM
LOCATION ZONE: NECK
LOCATION ZONE: ARM

## 2024-01-09 ASSESSMENT — LOCATION SIMPLE DESCRIPTION DERM
LOCATION SIMPLE: RIGHT ANTERIOR NECK
LOCATION SIMPLE: RIGHT WRIST
LOCATION SIMPLE: LEFT SHOULDER

## 2024-01-09 NOTE — PROCEDURE: ORDER TESTS
Performing Laboratory: 0
Expected Date Of Service: 01/09/2024
Billing Type: Third-Party Bill
Bill For Surgical Tray: no

## 2024-01-09 NOTE — PROCEDURE: ADDITIONAL NOTES
Render Risk Assessment In Note?: no
Detail Level: Simple
Additional Notes: Discussed that I favor a neuropathic origin of her complaint, we discussed the lack of primary skin changes and her breast cancer treatment; neuropathy is a reported side effect of this drug combination. Recommended increasing gabapentin to 300 mg bid and contact me in 1 week with how things are doing. Clobetasol and Xyzal as directed. We also discussed n-acetylcysteine as an alternative to gabapentin, OTC, though I recommended the gabapentin. To contact me in 1 week with how things are doing. Ferritin and TSH ordered for completeness.

## 2024-01-21 PROCEDURE — RXMED WILLOW AMBULATORY MEDICATION CHARGE: Performed by: INTERNAL MEDICINE

## 2024-01-23 ENCOUNTER — HOSPITAL ENCOUNTER (OUTPATIENT)
Dept: CARDIOLOGY | Facility: MEDICAL CENTER | Age: 59
End: 2024-01-23
Attending: INTERNAL MEDICINE
Payer: COMMERCIAL

## 2024-01-23 DIAGNOSIS — C50.512 MALIGNANT NEOPLASM OF LOWER-OUTER QUADRANT OF LEFT FEMALE BREAST, UNSPECIFIED ESTROGEN RECEPTOR STATUS (HCC): ICD-10-CM

## 2024-01-23 PROCEDURE — 93306 TTE W/DOPPLER COMPLETE: CPT

## 2024-01-24 ENCOUNTER — PHARMACY VISIT (OUTPATIENT)
Dept: PHARMACY | Facility: MEDICAL CENTER | Age: 59
End: 2024-01-24
Payer: COMMERCIAL

## 2024-01-24 ENCOUNTER — OFFICE VISIT (OUTPATIENT)
Dept: CARDIOLOGY | Facility: MEDICAL CENTER | Age: 59
End: 2024-01-24
Payer: COMMERCIAL

## 2024-01-24 VITALS
OXYGEN SATURATION: 95 % | DIASTOLIC BLOOD PRESSURE: 62 MMHG | HEART RATE: 58 BPM | WEIGHT: 177 LBS | SYSTOLIC BLOOD PRESSURE: 100 MMHG | BODY MASS INDEX: 26.83 KG/M2 | RESPIRATION RATE: 14 BRPM | HEIGHT: 68 IN

## 2024-01-24 DIAGNOSIS — I34.0 MILD MITRAL REGURGITATION: ICD-10-CM

## 2024-01-24 DIAGNOSIS — Q21.12 PFO (PATENT FORAMEN OVALE): ICD-10-CM

## 2024-01-24 DIAGNOSIS — C50.512 MALIGNANT NEOPLASM OF LOWER-OUTER QUADRANT OF LEFT FEMALE BREAST, UNSPECIFIED ESTROGEN RECEPTOR STATUS (HCC): ICD-10-CM

## 2024-01-24 DIAGNOSIS — Z13.9 SCREENING DUE: ICD-10-CM

## 2024-01-24 DIAGNOSIS — E78.2 MIXED HYPERLIPIDEMIA: ICD-10-CM

## 2024-01-24 LAB
LV EJECT FRACT  99904: 65
LV EJECT FRACT MOD 2C 99903: 64.16
LV EJECT FRACT MOD 4C 99902: 51.95
LV EJECT FRACT MOD BP 99901: 58.25

## 2024-01-24 PROCEDURE — 99214 OFFICE O/P EST MOD 30 MIN: CPT | Performed by: INTERNAL MEDICINE

## 2024-01-24 PROCEDURE — 3074F SYST BP LT 130 MM HG: CPT | Performed by: INTERNAL MEDICINE

## 2024-01-24 PROCEDURE — 94618 PULMONARY STRESS TESTING: CPT | Mod: 26 | Performed by: INTERNAL MEDICINE

## 2024-01-24 PROCEDURE — 99212 OFFICE O/P EST SF 10 MIN: CPT | Performed by: INTERNAL MEDICINE

## 2024-01-24 PROCEDURE — 99204 OFFICE O/P NEW MOD 45 MIN: CPT | Mod: 25 | Performed by: INTERNAL MEDICINE

## 2024-01-24 PROCEDURE — 94618 PULMONARY STRESS TESTING: CPT | Performed by: INTERNAL MEDICINE

## 2024-01-24 PROCEDURE — 93306 TTE W/DOPPLER COMPLETE: CPT | Mod: 26 | Performed by: INTERNAL MEDICINE

## 2024-01-24 PROCEDURE — 3078F DIAST BP <80 MM HG: CPT | Performed by: INTERNAL MEDICINE

## 2024-01-24 RX ORDER — ROSUVASTATIN CALCIUM 10 MG/1
10 TABLET, COATED ORAL EVERY EVENING
Qty: 30 TABLET | Refills: 11 | Status: SHIPPED | OUTPATIENT
Start: 2024-01-24

## 2024-01-24 ASSESSMENT — MINNESOTA LIVING WITH HEART FAILURE QUESTIONNAIRE (MLHF)
WALKING ABOUT OR CLIMBING STAIRS DIFFICULT: 0
HAVING TO SIT OR LIE DOWN DURING THE DAY: 0
DIFFICULTY TO CONCENTRATE OR REMEMBERING THINGS: 0
GIVING YOU SIDE EFFECTS FROM TREATMENTS: 0
DIFFICULTY WITH RECREATIONAL PASTIMES, SPORTS, HOBBIES: 0
EATING LESS FOODS YOU LIKE: 0
MAKING YOU WORRY: 0
DIFFICULTY SLEEPING WELL AT NIGHT: 0
TOTAL_SCORE: 0
MAKING YOU FEEL DEPRESSED: 0
LOSS OF SELF CONTROL IN YOUR LIFE: 0
MAKING YOU SHORT OF BREATH: 0
DIFFICULTY WITH SEXUAL ACTIVITIES: 0
DIFFICULTY SOCIALIZING WITH FAMILY OR FRIENDS: 0
WORKING AROUND THE HOUSE OR YARD DIFFICULT: 0
DIFFICULTY GOING AWAY FROM HOME: 0
COSTING YOU MONEY FOR MEDICAL CARE: 0
MAKING YOU STAY IN A HOSPITAL: 0
FEELING LIKE A BURDEN TO FAMILY AND FRIENDS: 0
TIRED, FATIGUED OR LOW ON ENERGY: 0
SWELLING IN ANKLES OR LEGS: 0
DIFFICULTY WORKING TO EARN A LIVING: 0

## 2024-01-24 ASSESSMENT — ENCOUNTER SYMPTOMS
FALLS: 0
DOUBLE VISION: 0
DEPRESSION: 0
MYALGIAS: 0
VOMITING: 0
DIZZINESS: 0
EYE PAIN: 0
BLOOD IN STOOL: 0
BLURRED VISION: 0
PND: 0
FEVER: 0
CLAUDICATION: 0
EYE DISCHARGE: 0
SPEECH CHANGE: 0
NAUSEA: 0
HALLUCINATIONS: 0
SHORTNESS OF BREATH: 0
BRUISES/BLEEDS EASILY: 0
ABDOMINAL PAIN: 0
HEADACHES: 0
CHILLS: 0
PALPITATIONS: 0
ORTHOPNEA: 0
COUGH: 0
SENSORY CHANGE: 0
WEIGHT LOSS: 0
LOSS OF CONSCIOUSNESS: 0

## 2024-01-24 ASSESSMENT — 6 MINUTE WALK TEST (6MWT)
PERCEIVED BREATHLESSNESS AT 6 MIN: 0
HEART RATE AT 6 MIN: 58
SAO2 AT 6 MIN: 95
DISTANCE WALKED (FT): 403
PERCEIVED FATIGUE AT 6 MIN: 0
TOTAL DISTANCE WALKED (METERS): 403

## 2024-01-24 NOTE — PROGRESS NOTES
Chief Complaint   Patient presents with    Follow-Up     Dx: Chemotherapy induced cardiomyopathy (HCC)       Subjective     Melva Sauceda is a 58 y.o. female who presents today for cardiac care and evaluation due to concern of chemotherapy induced cardiomyopathy as she will be on chemotherapy for her breast cancer treatment [facility].  No prior cardiac problems.  No prior cardiac procedure or surgery.  She will be needing to continue her chemotherapy for the rest of her life.    Patient was able to complete 403 m during his 6 minute walk test. her O2 saturation at baseline was 95% and at the end of the test, the O2 saturation was 95%. She reported 0 level of dyspnea on Sheba scale.    In the interim, patient has been doing well without having any symptoms. Patient denies having chest pain, dyspnea, palpitation, presyncope, syncope episodes. Able to climb up at least 2 flights of stairs.    I have independently interpreted and reviewed echocardiogram's actual images with patient which showed normal left ventricular systolic function. No wall motion abnormality. No evidence of pulmonary hypertension. No significant valvular disease. ?PFO.    I have independently interpreted and reviewed blood tests results with patient in clinic which shows LDL level of 140, triglycerides level of 101, GFR of 103, K of 4.4.      Past Medical History:   Diagnosis Date    Cancer (HCC) 12/2021    Breast cancer - left    Cataract     H/O OU    COVID-19 06/11/2022    Detached retina, right     H/O    PONV (postoperative nausea and vomiting)     Prediabetes      Past Surgical History:   Procedure Laterality Date    PB MASTECTOMY, PARTIAL Left 7/12/2022    Procedure: LEFT ELIAN  LOCALIZED PARTIAL MASTECTOMY, LEFT SENTINEL NODE BIOPSY, POSSIBLE AXILLARY DISSECTION, BIOABSORBABLE FIDUCIAL MARKER PLACEMENT;  Surgeon: Shane Jorge M.D.;  Location: SURGERY [facility];  Service: General    NODE BIOPSY SENTINEL Left 7/12/2022    Procedure:  BIOPSY, LYMPH NODE, SENTINEL;  Surgeon: Shnae Jorge M.D.;  Location: SURGERY [facility];  Service: General    AXILLARY NODE DISSECTION Left 2022    Procedure: LYMPHADENECTOMY,  AXILLARY;  Surgeon: Shane Jorge M.D.;  Location: SURGERY [facility];  Service: General    WY ULTRASONIC GUIDANCE, INTRAOPERATIVE Right 2021    Procedure: ULTRASOUND GUIDANCE;  Surgeon: Shane Jorge M.D.;  Location: SURGERY [facility];  Service: General    CATH PLACEMENT Right 2021    Procedure: INSERTION, CATHETER - PORT A;  Surgeon: Shane Jorge M.D.;  Location: SURGERY [facility];  Service: General    RETINAL DETACHMENT REPAIR Right     BUNIONECTOMY Right     CATARACT EXTRACTION WITH IOL Left     GYN SURGERY           LUMPECTOMY Bilateral     >20 years    OTHER      Cataract Extraction OD     Family History   Problem Relation Age of Onset    Cancer Mother         breast and bladder    Hypertension Mother     Arrythmia Mother      Social History     Socioeconomic History    Marital status: Single     Spouse name: Not on file    Number of children: Not on file    Years of education: Not on file    Highest education level: Not on file   Occupational History    Not on file   Tobacco Use    Smoking status: Never    Smokeless tobacco: Never   Vaping Use    Vaping Use: Never used   Substance and Sexual Activity    Alcohol use: Yes     Comment: 1 drink per day    Drug use: Never    Sexual activity: Not Currently   Other Topics Concern    Not on file   Social History Narrative    Not on file     Social Determinants of Health     Financial Resource Strain: Not on file   Food Insecurity: Not on file   Transportation Needs: Not on file   Physical Activity: Not on file   Stress: Not on file   Social Connections: Not on file   Intimate Partner Violence: Not on file   Housing Stability: Not on file     No Known Allergies  Outpatient Encounter Medications as of 2024   Medication Sig Dispense Refill     rosuvastatin (CRESTOR) 10 MG Tab Take 1 Tablet by mouth every evening. 30 Tablet 11    anastrozole (ARIMIDEX) 1 MG Tab Take 1 tablet by mouth daily 90 Tablet 0    clobetasol (TEMOVATE) 0.05 % Cream Apply twice a day to itchy areas on right arm, right neck and left shoulder. Once better stop. Repeat as needed 30 g 0    gabapentin (NEURONTIN) 300 MG Cap Take 1 capsule by mouth twice a day with food. IF no improvement in itching in 1 week you can increase to three times a day 90 Capsule 0    Levocetirizine Dihydrochloride 5 MG Tab Take 1 tablet by mouth at bedtime for itching. 30 Tablet 0    Cholecalciferol (VITAMIN D3) 1.25 MG (44937 UT) Cap Take 1 Capsule by mouth 1 time a day as needed.      [DISCONTINUED] gabapentin (NEURONTIN) 100 MG Cap Take 1 capsule by mouth daily for one week. If no improvement you may increase to 2 capsules a day (Patient not taking: Reported on 1/24/2024) 60 Capsule 0    [DISCONTINUED] gabapentin (NEURONTIN) 100 MG Cap Take 1 Capsule by mouth every day. (Patient not taking: Reported on 1/24/2024) 60 Capsule 0    [DISCONTINUED] anastrozole (ARIMIDEX) 1 MG Tab Take 1 mg by mouth every day. (Patient not taking: Reported on 1/24/2024)      [DISCONTINUED] Pertuz-Trastuz-Hyaluron-zzxf (PHESGO SC) Inject 1 Syringe under the skin every 21 days. (Patient not taking: Reported on 1/24/2024)       No facility-administered encounter medications on file as of 1/24/2024.     Review of Systems   Constitutional:  Negative for chills, fever, malaise/fatigue and weight loss.   HENT:  Negative for ear discharge, ear pain, hearing loss and nosebleeds.    Eyes:  Negative for blurred vision, double vision, pain and discharge.   Respiratory:  Negative for cough and shortness of breath.    Cardiovascular:  Negative for chest pain, palpitations, orthopnea, claudication, leg swelling and PND.   Gastrointestinal:  Negative for abdominal pain, blood in stool, melena, nausea and vomiting.   Genitourinary:  Negative for  "dysuria and hematuria.   Musculoskeletal:  Negative for falls, joint pain and myalgias.   Skin:  Negative for itching and rash.   Neurological:  Negative for dizziness, sensory change, speech change, loss of consciousness and headaches.   Endo/Heme/Allergies:  Negative for environmental allergies. Does not bruise/bleed easily.   Psychiatric/Behavioral:  Negative for depression, hallucinations and suicidal ideas.               Objective     /62 (BP Location: Right arm, Patient Position: Sitting, BP Cuff Size: Adult)   Pulse (!) 58   Resp 14   Ht 1.727 m (5' 8\")   Wt 80.3 kg (177 lb)   SpO2 95%   BMI 26.91 kg/m²     Physical Exam  Vitals and nursing note reviewed.   Constitutional:       General: She is not in acute distress.     Appearance: She is not diaphoretic.   HENT:      Head: Normocephalic and atraumatic.      Right Ear: External ear normal.      Left Ear: External ear normal.      Nose: No congestion or rhinorrhea.   Eyes:      General:         Right eye: No discharge.         Left eye: No discharge.   Neck:      Thyroid: No thyromegaly.      Vascular: No JVD.   Cardiovascular:      Rate and Rhythm: Normal rate and regular rhythm.      Pulses: Normal pulses.   Pulmonary:      Effort: No respiratory distress.   Abdominal:      General: There is no distension.      Tenderness: There is no abdominal tenderness.   Musculoskeletal:         General: No swelling or tenderness.      Right lower leg: No edema.      Left lower leg: No edema.   Skin:     General: Skin is warm and dry.   Neurological:      Mental Status: She is alert and oriented to person, place, and time.      Cranial Nerves: No cranial nerve deficit.   Psychiatric:         Behavior: Behavior normal.                Assessment & Plan     1. Malignant neoplasm of lower-outer quadrant of left female breast, unspecified estrogen receptor status (HCC)  EC-ECHOCARDIOGRAM COMPLETE W/O CONT      2. Mild mitral regurgitation  EC-ECHOCARDIOGRAM " COMPLETE W/O CONT      3. PFO (patent foramen ovale)  EC-ECHOCARDIOGRAM COMPLETE W/O CONT      4. Screening due  Basic Metabolic Panel    LIPID PANEL      5. Mixed hyperlipidemia  rosuvastatin (CRESTOR) 10 MG Tab          Medical Decision Making: Today's Assessment/Status/Plan:   At this time patient is clinically stable in terms of her cardiac standpoint.  No diagnosis of heart failure at this time.  Continue current medications at current dose as above.     I will order transthoracic echocardiogram to assess for structural abnormalities in 6 months.    Will start Rosuvastatin 10 mg daily.    Missael Lee M.D.

## 2024-01-29 ENCOUNTER — PHARMACY VISIT (OUTPATIENT)
Dept: PHARMACY | Facility: MEDICAL CENTER | Age: 59
End: 2024-01-29
Payer: COMMERCIAL

## 2024-01-29 ENCOUNTER — OFFICE VISIT (OUTPATIENT)
Dept: URGENT CARE | Facility: CLINIC | Age: 59
End: 2024-01-29
Payer: COMMERCIAL

## 2024-01-29 VITALS
WEIGHT: 165 LBS | BODY MASS INDEX: 25.01 KG/M2 | HEIGHT: 68 IN | TEMPERATURE: 97.7 F | DIASTOLIC BLOOD PRESSURE: 64 MMHG | SYSTOLIC BLOOD PRESSURE: 120 MMHG | RESPIRATION RATE: 16 BRPM | HEART RATE: 97 BPM | OXYGEN SATURATION: 97 %

## 2024-01-29 DIAGNOSIS — J01.40 ACUTE NON-RECURRENT PANSINUSITIS: ICD-10-CM

## 2024-01-29 PROCEDURE — 3078F DIAST BP <80 MM HG: CPT | Performed by: NURSE PRACTITIONER

## 2024-01-29 PROCEDURE — 3074F SYST BP LT 130 MM HG: CPT | Performed by: NURSE PRACTITIONER

## 2024-01-29 PROCEDURE — RXMED WILLOW AMBULATORY MEDICATION CHARGE: Performed by: NURSE PRACTITIONER

## 2024-01-29 PROCEDURE — 99213 OFFICE O/P EST LOW 20 MIN: CPT | Performed by: NURSE PRACTITIONER

## 2024-01-29 RX ORDER — AMOXICILLIN AND CLAVULANATE POTASSIUM 875; 125 MG/1; MG/1
1 TABLET, FILM COATED ORAL 2 TIMES DAILY
Qty: 20 TABLET | Refills: 0 | Status: SHIPPED | OUTPATIENT
Start: 2024-01-29 | End: 2024-02-08

## 2024-01-29 NOTE — PROGRESS NOTES
Date: 01/29/24        Chief Complaint   Patient presents with    Sinus Problem     Sinus infection patient states x 1 week, sinus congestions patient states, sinus pressure, patient also states is on Chemo         History of Present Illness: 58 y.o. female  presents presents to clinic with 1 week history of sinus congestion pain pressure that is worsening over the past 2 to 3 days.  She states the congestion does get worse at night she is having pressure to her maxillary sinus as well as her teeth.  She does currently get chemo every 21 days last chemo infusion was 1 week ago.  She denies any shortness of breath chest pain.  She does not have a history of antibiotic induced yeast infections.  No history of thrush.  No nausea vomiting diarrhea.        ROS:  As stated in HPI       Medical/SX/ Social History:  Reviewed per chart    Medications:    Current Outpatient Medications on File Prior to Visit   Medication Sig Dispense Refill    Trastuzumab (HERCEPTIN IV) Infuse  into a venous catheter.      Pertuzumab (PERJETA IV) Infuse  into a venous catheter.      rosuvastatin (CRESTOR) 10 MG Tab Take 1 Tablet by mouth every evening. 30 Tablet 11    anastrozole (ARIMIDEX) 1 MG Tab Take 1 tablet by mouth daily 90 Tablet 0    clobetasol (TEMOVATE) 0.05 % Cream Apply twice a day to itchy areas on right arm, right neck and left shoulder. Once better stop. Repeat as needed 30 g 0    gabapentin (NEURONTIN) 300 MG Cap Take 1 capsule by mouth twice a day with food. IF no improvement in itching in 1 week you can increase to three times a day 90 Capsule 0    Levocetirizine Dihydrochloride 5 MG Tab Take 1 tablet by mouth at bedtime for itching. 30 Tablet 0    Cholecalciferol (VITAMIN D3) 1.25 MG (51672 UT) Cap Take 1 Capsule by mouth 1 time a day as needed.       No current facility-administered medications on file prior to visit.        Allergies:    Patient has no known allergies.     Problem list, medications, and allergies reviewed  by myself today in [facility]       Physical Exam:  Vitals:    01/29/24 0812   BP: 120/64   Pulse: 97   Resp: 16   Temp: 36.5 °C (97.7 °F)   SpO2: 97%        Physical Exam  Vitals reviewed.   Constitutional:       General: She is not in acute distress.     Appearance: Normal appearance. She is well-developed. She is not toxic-appearing.   HENT:      Head: Normocephalic and atraumatic.      Right Ear: Tympanic membrane, ear canal and external ear normal.      Left Ear: Tympanic membrane, ear canal and external ear normal.      Nose: Mucosal edema, congestion and rhinorrhea present.      Right Turbinates: Swollen.      Left Turbinates: Swollen.      Right Sinus: Maxillary sinus tenderness and frontal sinus tenderness present.      Left Sinus: Maxillary sinus tenderness and frontal sinus tenderness present.      Mouth/Throat:      Lips: Pink.      Mouth: Mucous membranes are moist.      Pharynx: Posterior oropharyngeal erythema present.      Tonsils: No tonsillar exudate.   Eyes:      General: Lids are normal. Gaze aligned appropriately. No allergic shiner or scleral icterus.     Extraocular Movements: Extraocular movements intact.      Conjunctiva/sclera: Conjunctivae normal.      Pupils: Pupils are equal, round, and reactive to light.   Cardiovascular:      Rate and Rhythm: Normal rate and regular rhythm.      Pulses:           Radial pulses are 2+ on the right side and 2+ on the left side.      Heart sounds: Normal heart sounds.   Pulmonary:      Effort: Pulmonary effort is normal.      Breath sounds: Normal breath sounds. No wheezing.   Musculoskeletal:      Right lower leg: No edema.      Left lower leg: No edema.   Lymphadenopathy:      Cervical: No cervical adenopathy.   Skin:     General: Skin is warm.      Capillary Refill: Capillary refill takes less than 2 seconds.      Coloration: Skin is not cyanotic or pale.      Findings: No rash.   Neurological:      Mental Status: She is alert.      Gait: Gait is intact.    Psychiatric:         Behavior: Behavior normal. Behavior is cooperative.            Medical Decision Making / Plan :  I personally reviewed prior external notes and test results pertinent to today's visit. Pt is clinically stable at today's acute urgent care visit.  Patient appears nontoxic with no acute distress noted. Appropriate for outpatient care at this time. The patient remained stable during the urgent care visit.     Pleasant 58 y.o. female  presented clinic with HPI exam findings consistent with second sickening of viral URI to acute bacterial pansinusitis due to longevity of symptoms and worsening sinus congestion.  Discussed treatment below.  Recommended probiotics through food intake  Shared decision-making was utilized with patient for treatment plan. Differential Diagnosis, natural history, and supportive care discussed. Did advise patient on conservative measures for management of symptoms.  Patient is agreeable to pursue adequate rest, adequate hydration, saltwater gargle and Neti pot for any symptoms of upper respiratory congestion.  Over-the-counter analgesia and antipyretics on a p.r.n. basis as needed for pain and fever.  Did discuss over-the-counter cough medications.        1. Acute non-recurrent pansinusitis    - amoxicillin-clavulanate (AUGMENTIN) 875-125 MG Tab; Take 1 Tablet by mouth 2 times a day for 10 days.  Dispense: 20 Tablet; Refill: 0     Medication discussed included indication for use and the potential benefits and side effects. Education was provided regarding the aforementioned assessments.  All of the patient's questions were answered to their satisfaction at the time of discharge. Patient was encouraged to monitor symptoms closely. Those signs and symptoms which would warrant concern and mandate seeking a higher level of service through the emergency department discussed at length.  Patient stated agreement and understanding of this plan of care.        Disposition:  Patient  was discharged in stable condition.    Voice Recognition Disclaimer: Portions of this document were created using voice recognition software. The software does have a chance of producing errors of grammar and possibly content. I have made every reasonable attempt to correct obvious errors, but there may be errors of grammar and possibly content that I did not discover before finalizing the documentation.    BENEDICT Romero.

## 2024-02-13 ENCOUNTER — HOSPITAL ENCOUNTER (OUTPATIENT)
Facility: MEDICAL CENTER | Age: 59
End: 2024-02-13
Attending: INTERNAL MEDICINE
Payer: COMMERCIAL

## 2024-02-13 PROCEDURE — 87798 DETECT AGENT NOS DNA AMP: CPT

## 2024-02-16 LAB
SPECIMEN SOURCE: NORMAL
VZV DNA SPEC QL NAA+PROBE: NOT DETECTED

## 2024-03-27 ENCOUNTER — HOSPITAL ENCOUNTER (OUTPATIENT)
Dept: RADIOLOGY | Facility: MEDICAL CENTER | Age: 59
End: 2024-03-27
Attending: INTERNAL MEDICINE
Payer: COMMERCIAL

## 2024-03-27 DIAGNOSIS — Z12.31 ENCOUNTER FOR MAMMOGRAM TO ESTABLISH BASELINE MAMMOGRAM: ICD-10-CM

## 2024-03-27 PROCEDURE — 77067 SCR MAMMO BI INCL CAD: CPT

## 2024-04-25 ENCOUNTER — HOSPITAL ENCOUNTER (OUTPATIENT)
Dept: RADIOLOGY | Facility: MEDICAL CENTER | Age: 59
End: 2024-04-25

## 2024-05-01 ENCOUNTER — PHARMACY VISIT (OUTPATIENT)
Dept: PHARMACY | Facility: MEDICAL CENTER | Age: 59
End: 2024-05-01
Payer: COMMERCIAL

## 2024-05-01 PROCEDURE — RXMED WILLOW AMBULATORY MEDICATION CHARGE: Performed by: INTERNAL MEDICINE

## 2024-05-13 ENCOUNTER — APPOINTMENT (OUTPATIENT)
Dept: RADIOLOGY | Facility: MEDICAL CENTER | Age: 59
End: 2024-05-13
Attending: EMERGENCY MEDICINE
Payer: COMMERCIAL

## 2024-05-13 ENCOUNTER — HOSPITAL ENCOUNTER (EMERGENCY)
Facility: MEDICAL CENTER | Age: 59
End: 2024-05-13
Attending: EMERGENCY MEDICINE
Payer: COMMERCIAL

## 2024-05-13 VITALS
RESPIRATION RATE: 16 BRPM | DIASTOLIC BLOOD PRESSURE: 86 MMHG | WEIGHT: 166.67 LBS | HEART RATE: 63 BPM | OXYGEN SATURATION: 96 % | HEIGHT: 68 IN | BODY MASS INDEX: 25.26 KG/M2 | TEMPERATURE: 97.2 F | SYSTOLIC BLOOD PRESSURE: 146 MMHG

## 2024-05-13 DIAGNOSIS — M25.562 CHRONIC PAIN OF LEFT KNEE: ICD-10-CM

## 2024-05-13 DIAGNOSIS — G89.29 CHRONIC PAIN OF LEFT KNEE: ICD-10-CM

## 2024-05-13 PROCEDURE — 93971 EXTREMITY STUDY: CPT | Mod: 26,LT | Performed by: INTERNAL MEDICINE

## 2024-05-13 RX ORDER — IBUPROFEN 800 MG/1
800 TABLET ORAL EVERY 8 HOURS PRN
Qty: 30 TABLET | Refills: 0 | Status: ON HOLD | OUTPATIENT
Start: 2024-05-13 | End: 2024-05-24

## 2024-05-13 RX ORDER — HYDROCODONE BITARTRATE AND ACETAMINOPHEN 5; 325 MG/1; MG/1
1 TABLET ORAL EVERY 4 HOURS PRN
Qty: 12 TABLET | Refills: 0 | Status: SHIPPED | OUTPATIENT
Start: 2024-05-13 | End: 2024-05-16

## 2024-05-13 NOTE — ED PROVIDER NOTES
"ED Provider Note    CHIEF COMPLAINT  Chief Complaint   Patient presents with    Leg Pain     LLE  From Knee to LLE  Started yesterday after running a [place]K [place], unable to bear wt on LLE today r/t pain       EXTERNAL RECORDS REVIEWED  Outpatient Notes   [facility], cardiology    HPI/ROS  LIMITATION TO HISTORY   Select: : None  OUTSIDE HISTORIAN(S):  None    Melva Sauceda is a 58 y.o. female who presents here for evaluation of left knee and calf pain.  Patient states that she ran a marathon recently, and yesterday ran a 5K.  Patient states that she is wondering if she has a \"stress fracture\" since she was able to run the 5K, but then had pain after the event to the lateral part of her knee and calf.  She has no chest pain shortness breath, no vomiting, no fever or chills.  Patient has no headache, or neck pain.    PAST MEDICAL HISTORY   has a past medical history of Cancer (HCC) (12/2021), Cataract, COVID-19 (06/11/2022), Detached retina, right, PONV (postoperative nausea and vomiting), and Prediabetes.    SURGICAL HISTORY   has a past surgical history that includes bunionectomy (Right); ultrasonic guidance, intraoperative (Right, 12/31/2021); cath placement (Right, 12/31/2021); cataract extraction with iol (Left); gyn surgery; lumpectomy (Bilateral); other; retinal detachment repair (Right, 2014); mastectomy, partial (Left, 7/12/2022); node biopsy sentinel (Left, 7/12/2022); and axillary node dissection (Left, 7/12/2022).    FAMILY HISTORY  Family History   Problem Relation Age of Onset    Cancer Mother         breast and bladder    Hypertension Mother     Arrythmia Mother        SOCIAL HISTORY  Social History     Tobacco Use    Smoking status: Never    Smokeless tobacco: Never   Vaping Use    Vaping Use: Never used   Substance and Sexual Activity    Alcohol use: Yes    Drug use: Never    Sexual activity: Not Currently       CURRENT MEDICATIONS  Home Medications    **Home medications have not yet been " "reviewed for this encounter**         ALLERGIES  No Known Allergies    PHYSICAL EXAM  VITAL SIGNS: BP (!) 146/86   Pulse 63   Temp 36.2 °C (97.2 °F) (Temporal)   Resp 16   Ht 1.727 m (5' 8\")   Wt 75.6 kg (166 lb 10.7 oz)   SpO2 96%   BMI 25.34 kg/m²    Constitutional: Well developed, well nourished. No acute distress.  HEENT: Normocephalic, atraumatic. Posterior pharynx clear and moist.  Eyes:  EOMI. Normal sclera.  Neck: Supple, Full range of motion, nontender.  Back: No CVA tenderness, nontender midline, no step offs.  Musculoskeletal: No deformity, no edema, neurovascular intact. LLE:  tenderness to the medial knee and posterior calf. Dpp present. Normal temp.   Neuro: Clear speech, appropriate, cooperative, cranial nerves II-XII grossly intact.  Psych: Normal mood and affect      EKG/LABS  none    RADIOLOGY/PROCEDURES   I have independently interpreted the diagnostic imaging associated with this visit and am waiting the final reading from the radiologist.   My preliminary interpretation is as follows: see below     Radiologist interpretation:  US-EXTREMITY VENOUS LOWER UNILAT LEFT   Final Result      DX-KNEE 3 VIEWS LEFT   Final Result         1. Mild osteoarthrosis.   2. No definite fracture or dislocation.   3. Joint effusion.          COURSE & MEDICAL DECISION MAKING    ASSESSMENT, COURSE AND PLAN  Care Narrative: This is a 58-year-old female here for evaluation of left knee pain, after running a marathon at a 5K.  Patient is no acute finding on x-ray, other than a small effusion, and no clot on ultrasound.  Patient has Ortho referral that she is going to see Dr. Polanco, this week, patient already has crutches that she is using, and we will wrap the leg as well.      DISPOSITION AND DISCUSSIONS  I have discussed management of the patient with the following physicians and PO's:  none    Discussion of management with other QHP or appropriate source(s): None     Escalation of care considered, and " ultimately not performed: no iv fluids necessary.    Barriers to care at this time, including but not limited to: Patient does not have established PCP.     Decision tools and prescription drugs considered including, but not limited to: Norco, Motrin.    FINAL DIAGNOSIS  1. Chronic pain of left knee           Electronically signed by: Durga Lafleur D.O., 5/13/2024 8:50 AM

## 2024-05-13 NOTE — ED NOTES
Reviewed discharge instructions and prescriptions x 2 sent to selected pharmacy w/ pt, verbalized understanding to information provided including follow up care w/ Ortho, return precautions and medications, denied further questions/concerns.  Narcotic consent signed and placed on chart.  Pt ambulated from ED using crutches w/o difficulty.

## 2024-05-13 NOTE — ED TRIAGE NOTES
"Chief Complaint   Patient presents with    Leg Pain     LLE  From Knee to LLE  Started yesterday after running a 5K [place], unable to bear wt on LLE today r/t pain     BP (!) 146/86   Pulse 63   Temp 36.2 °C (97.2 °F) (Temporal)   Resp 16   Ht 1.727 m (5' 8\")   Wt 75.6 kg (166 lb 10.7 oz)   SpO2 96%   BMI 25.34 kg/m²     Pt ambulated to ED using crutches for c/o LLE pain s/p running a 5K yesterday.  Pt c/o pain around knee and Fibula.    "

## 2024-05-23 ENCOUNTER — APPOINTMENT (OUTPATIENT)
Dept: RADIOLOGY | Facility: MEDICAL CENTER | Age: 59
End: 2024-05-23
Attending: INTERNAL MEDICINE
Payer: COMMERCIAL

## 2024-05-23 ENCOUNTER — HOSPITAL ENCOUNTER (OUTPATIENT)
Facility: MEDICAL CENTER | Age: 59
End: 2024-05-24
Attending: EMERGENCY MEDICINE | Admitting: INTERNAL MEDICINE
Payer: COMMERCIAL

## 2024-05-23 DIAGNOSIS — M54.50 ACUTE BILATERAL LOW BACK PAIN WITHOUT SCIATICA: ICD-10-CM

## 2024-05-23 PROBLEM — E78.5 DYSLIPIDEMIA: Status: ACTIVE | Noted: 2024-05-23

## 2024-05-23 LAB
ANION GAP SERPL CALC-SCNC: 9 MMOL/L (ref 7–16)
BUN SERPL-MCNC: 22 MG/DL (ref 8–22)
CALCIUM SERPL-MCNC: 9.1 MG/DL (ref 8.4–10.2)
CHLORIDE SERPL-SCNC: 102 MMOL/L (ref 96–112)
CO2 SERPL-SCNC: 26 MMOL/L (ref 20–33)
CREAT SERPL-MCNC: 0.56 MG/DL (ref 0.5–1.4)
ERYTHROCYTE [DISTWIDTH] IN BLOOD BY AUTOMATED COUNT: 45.9 FL (ref 35.9–50)
GFR SERPLBLD CREATININE-BSD FMLA CKD-EPI: 105 ML/MIN/1.73 M 2
GLUCOSE SERPL-MCNC: 123 MG/DL (ref 65–99)
HCT VFR BLD AUTO: 39.9 % (ref 37–47)
HGB BLD-MCNC: 13 G/DL (ref 12–16)
MCH RBC QN AUTO: 31.7 PG (ref 27–33)
MCHC RBC AUTO-ENTMCNC: 32.6 G/DL (ref 32.2–35.5)
MCV RBC AUTO: 97.3 FL (ref 81.4–97.8)
PLATELET # BLD AUTO: 231 K/UL (ref 164–446)
PMV BLD AUTO: 8.7 FL (ref 9–12.9)
POTASSIUM SERPL-SCNC: 4.3 MMOL/L (ref 3.6–5.5)
RBC # BLD AUTO: 4.1 M/UL (ref 4.2–5.4)
SODIUM SERPL-SCNC: 137 MMOL/L (ref 135–145)
WBC # BLD AUTO: 6.6 K/UL (ref 4.8–10.8)

## 2024-05-23 PROCEDURE — 99232 SBSQ HOSP IP/OBS MODERATE 35: CPT | Performed by: INTERNAL MEDICINE

## 2024-05-23 PROCEDURE — 99222 1ST HOSP IP/OBS MODERATE 55: CPT | Performed by: INTERNAL MEDICINE

## 2024-05-23 RX ORDER — OXYCODONE HYDROCHLORIDE 5 MG/1
5 TABLET ORAL
Status: DISCONTINUED | OUTPATIENT
Start: 2024-05-23 | End: 2024-05-24 | Stop reason: HOSPADM

## 2024-05-23 RX ORDER — ONDANSETRON 4 MG/1
4 TABLET, ORALLY DISINTEGRATING ORAL ONCE
Status: COMPLETED | OUTPATIENT
Start: 2024-05-23 | End: 2024-05-23

## 2024-05-23 RX ORDER — PROCHLORPERAZINE EDISYLATE 5 MG/ML
5-10 INJECTION INTRAMUSCULAR; INTRAVENOUS EVERY 4 HOURS PRN
Status: DISCONTINUED | OUTPATIENT
Start: 2024-05-23 | End: 2024-05-24 | Stop reason: HOSPADM

## 2024-05-23 RX ORDER — ONDANSETRON 2 MG/ML
4 INJECTION INTRAMUSCULAR; INTRAVENOUS EVERY 4 HOURS PRN
Status: DISCONTINUED | OUTPATIENT
Start: 2024-05-23 | End: 2024-05-24 | Stop reason: HOSPADM

## 2024-05-23 RX ORDER — CYCLOBENZAPRINE HCL 10 MG
5 TABLET ORAL 3 TIMES DAILY PRN
Status: DISCONTINUED | OUTPATIENT
Start: 2024-05-23 | End: 2024-05-24 | Stop reason: HOSPADM

## 2024-05-23 RX ORDER — GABAPENTIN 300 MG/1
300 CAPSULE ORAL 3 TIMES DAILY PRN
Status: DISCONTINUED | OUTPATIENT
Start: 2024-05-23 | End: 2024-05-24 | Stop reason: HOSPADM

## 2024-05-23 RX ORDER — CYCLOBENZAPRINE HCL 10 MG
5 TABLET ORAL ONCE
Status: COMPLETED | OUTPATIENT
Start: 2024-05-23 | End: 2024-05-23

## 2024-05-23 RX ORDER — ENOXAPARIN SODIUM 100 MG/ML
40 INJECTION SUBCUTANEOUS DAILY
Status: DISCONTINUED | OUTPATIENT
Start: 2024-05-23 | End: 2024-05-24 | Stop reason: HOSPADM

## 2024-05-23 RX ORDER — ROSUVASTATIN CALCIUM 10 MG/1
10 TABLET, COATED ORAL EVERY EVENING
Status: DISCONTINUED | OUTPATIENT
Start: 2024-05-23 | End: 2024-05-24 | Stop reason: HOSPADM

## 2024-05-23 RX ORDER — ACETAMINOPHEN 325 MG/1
650 TABLET ORAL EVERY 6 HOURS PRN
Status: DISCONTINUED | OUTPATIENT
Start: 2024-05-23 | End: 2024-05-24 | Stop reason: HOSPADM

## 2024-05-23 RX ORDER — ANASTROZOLE 1 MG/1
1 TABLET ORAL DAILY
Status: DISCONTINUED | OUTPATIENT
Start: 2024-05-23 | End: 2024-05-24 | Stop reason: HOSPADM

## 2024-05-23 RX ORDER — PROMETHAZINE HYDROCHLORIDE 25 MG/1
12.5-25 TABLET ORAL EVERY 4 HOURS PRN
Status: DISCONTINUED | OUTPATIENT
Start: 2024-05-23 | End: 2024-05-24 | Stop reason: HOSPADM

## 2024-05-23 RX ORDER — HYDROMORPHONE HYDROCHLORIDE 1 MG/ML
1 INJECTION, SOLUTION INTRAMUSCULAR; INTRAVENOUS; SUBCUTANEOUS ONCE
Status: COMPLETED | OUTPATIENT
Start: 2024-05-23 | End: 2024-05-23

## 2024-05-23 RX ORDER — MORPHINE SULFATE 4 MG/ML
4 INJECTION INTRAVENOUS
Status: DISCONTINUED | OUTPATIENT
Start: 2024-05-23 | End: 2024-05-24 | Stop reason: HOSPADM

## 2024-05-23 RX ORDER — LABETALOL HYDROCHLORIDE 5 MG/ML
10 INJECTION, SOLUTION INTRAVENOUS EVERY 4 HOURS PRN
Status: DISCONTINUED | OUTPATIENT
Start: 2024-05-23 | End: 2024-05-24 | Stop reason: HOSPADM

## 2024-05-23 RX ORDER — LIDOCAINE 4 G/G
1 PATCH TOPICAL ONCE
Status: COMPLETED | OUTPATIENT
Start: 2024-05-23 | End: 2024-05-23

## 2024-05-23 RX ORDER — METHOCARBAMOL 500 MG/1
500 TABLET, FILM COATED ORAL 4 TIMES DAILY
Status: DISCONTINUED | OUTPATIENT
Start: 2024-05-23 | End: 2024-05-24 | Stop reason: HOSPADM

## 2024-05-23 RX ORDER — LIDOCAINE 4 G/G
1 PATCH TOPICAL EVERY 24 HOURS
Status: DISCONTINUED | OUTPATIENT
Start: 2024-05-24 | End: 2024-05-24 | Stop reason: HOSPADM

## 2024-05-23 RX ORDER — OXYCODONE HYDROCHLORIDE 10 MG/1
10 TABLET ORAL
Status: DISCONTINUED | OUTPATIENT
Start: 2024-05-23 | End: 2024-05-24 | Stop reason: HOSPADM

## 2024-05-23 RX ORDER — PROMETHAZINE HYDROCHLORIDE 25 MG/1
12.5-25 SUPPOSITORY RECTAL EVERY 4 HOURS PRN
Status: DISCONTINUED | OUTPATIENT
Start: 2024-05-23 | End: 2024-05-24 | Stop reason: HOSPADM

## 2024-05-23 RX ORDER — GABAPENTIN 100 MG/1
100 CAPSULE ORAL 3 TIMES DAILY PRN
COMMUNITY

## 2024-05-23 RX ORDER — DIPHENHYDRAMINE HCL 25 MG
25 TABLET ORAL NIGHTLY PRN
Status: DISCONTINUED | OUTPATIENT
Start: 2024-05-23 | End: 2024-05-24 | Stop reason: HOSPADM

## 2024-05-23 RX ORDER — ONDANSETRON 4 MG/1
4 TABLET, ORALLY DISINTEGRATING ORAL EVERY 4 HOURS PRN
Status: DISCONTINUED | OUTPATIENT
Start: 2024-05-23 | End: 2024-05-24 | Stop reason: HOSPADM

## 2024-05-23 RX ADMIN — LIDOCAINE 1 PATCH: 4 PATCH TOPICAL at 02:44

## 2024-05-23 RX ADMIN — HYDROMORPHONE HYDROCHLORIDE 1 MG: 1 INJECTION, SOLUTION INTRAMUSCULAR; INTRAVENOUS; SUBCUTANEOUS at 02:44

## 2024-05-23 RX ADMIN — ONDANSETRON 4 MG: 4 TABLET, ORALLY DISINTEGRATING ORAL at 03:44

## 2024-05-23 RX ADMIN — PREDNISONE 60 MG: 50 TABLET ORAL at 13:11

## 2024-05-23 RX ADMIN — METHOCARBAMOL 500 MG: 500 TABLET ORAL at 17:08

## 2024-05-23 RX ADMIN — CYCLOBENZAPRINE 5 MG: 10 TABLET, FILM COATED ORAL at 05:06

## 2024-05-23 RX ADMIN — METHOCARBAMOL 500 MG: 500 TABLET ORAL at 20:18

## 2024-05-23 RX ADMIN — ANASTROZOLE 1 MG: 1 TABLET, FILM COATED ORAL at 10:05

## 2024-05-23 RX ADMIN — METHOCARBAMOL 500 MG: 500 TABLET ORAL at 13:11

## 2024-05-23 SDOH — ECONOMIC STABILITY: TRANSPORTATION INSECURITY
IN THE PAST 12 MONTHS, HAS LACK OF RELIABLE TRANSPORTATION KEPT YOU FROM MEDICAL APPOINTMENTS, MEETINGS, WORK OR FROM GETTING THINGS NEEDED FOR DAILY LIVING?: NO

## 2024-05-23 SDOH — ECONOMIC STABILITY: TRANSPORTATION INSECURITY
IN THE PAST 12 MONTHS, HAS THE LACK OF TRANSPORTATION KEPT YOU FROM MEDICAL APPOINTMENTS OR FROM GETTING MEDICATIONS?: NO

## 2024-05-23 ASSESSMENT — SOCIAL DETERMINANTS OF HEALTH (SDOH)
WITHIN THE LAST YEAR, HAVE YOU BEEN HUMILIATED OR EMOTIONALLY ABUSED IN OTHER WAYS BY YOUR PARTNER OR EX-PARTNER?: NO
WITHIN THE LAST YEAR, HAVE YOU BEEN KICKED, HIT, SLAPPED, OR OTHERWISE PHYSICALLY HURT BY YOUR PARTNER OR EX-PARTNER?: NO
WITHIN THE LAST YEAR, HAVE YOU BEEN AFRAID OF YOUR PARTNER OR EX-PARTNER?: NO
IN THE PAST 12 MONTHS, HAS THE ELECTRIC, GAS, OIL, OR WATER COMPANY THREATENED TO SHUT OFF SERVICE IN YOUR HOME?: NO
WITHIN THE LAST YEAR, HAVE TO BEEN RAPED OR FORCED TO HAVE ANY KIND OF SEXUAL ACTIVITY BY YOUR PARTNER OR EX-PARTNER?: NO
WITHIN THE PAST 12 MONTHS, THE FOOD YOU BOUGHT JUST DIDN'T LAST AND YOU DIDN'T HAVE MONEY TO GET MORE: NEVER TRUE
IN THE PAST 12 MONTHS, HAS THE ELECTRIC, GAS, OIL, OR WATER COMPANY THREATENED TO SHUT OFF SERVICE IN YOUR HOME?: NO
WITHIN THE PAST 12 MONTHS, YOU WORRIED THAT YOUR FOOD WOULD RUN OUT BEFORE YOU GOT THE MONEY TO BUY MORE: NEVER TRUE
WITHIN THE PAST 12 MONTHS, THE FOOD YOU BOUGHT JUST DIDN'T LAST AND YOU DIDN'T HAVE MONEY TO GET MORE: NEVER TRUE
WITHIN THE PAST 12 MONTHS, YOU WORRIED THAT YOUR FOOD WOULD RUN OUT BEFORE YOU GOT THE MONEY TO BUY MORE: NEVER TRUE

## 2024-05-23 ASSESSMENT — ENCOUNTER SYMPTOMS
PSYCHIATRIC NEGATIVE: 1
WEAKNESS: 0
FOCAL WEAKNESS: 0
STRIDOR: 0
COUGH: 0
FEVER: 0
DIARRHEA: 0
PALPITATIONS: 0
GASTROINTESTINAL NEGATIVE: 1
LOSS OF CONSCIOUSNESS: 0
SPUTUM PRODUCTION: 0
DIZZINESS: 0
TINGLING: 0
RESPIRATORY NEGATIVE: 1
DEPRESSION: 0
VOMITING: 0
SENSORY CHANGE: 0
SHORTNESS OF BREATH: 0
HEADACHES: 0
CARDIOVASCULAR NEGATIVE: 1
EYES NEGATIVE: 1
ABDOMINAL PAIN: 0
BACK PAIN: 1
CONSTIPATION: 0
NAUSEA: 0
MYALGIAS: 0
CHILLS: 0
FALLS: 0

## 2024-05-23 ASSESSMENT — LIFESTYLE VARIABLES
HAVE PEOPLE ANNOYED YOU BY CRITICIZING YOUR DRINKING: NO
EVER FELT BAD OR GUILTY ABOUT YOUR DRINKING: NO
AVERAGE NUMBER OF DAYS PER WEEK YOU HAVE A DRINK CONTAINING ALCOHOL: 2
TOTAL SCORE: 0
DOES PATIENT WANT TO STOP DRINKING: NO
ALCOHOL_USE: YES
TOTAL SCORE: 0
HOW MANY TIMES IN THE PAST YEAR HAVE YOU HAD 5 OR MORE DRINKS IN A DAY: 0
CONSUMPTION TOTAL: NEGATIVE
TOTAL SCORE: 0
ON A TYPICAL DAY WHEN YOU DRINK ALCOHOL HOW MANY DRINKS DO YOU HAVE: 0
EVER HAD A DRINK FIRST THING IN THE MORNING TO STEADY YOUR NERVES TO GET RID OF A HANGOVER: NO
HAVE YOU EVER FELT YOU SHOULD CUT DOWN ON YOUR DRINKING: NO

## 2024-05-23 ASSESSMENT — COGNITIVE AND FUNCTIONAL STATUS - GENERAL
TURNING FROM BACK TO SIDE WHILE IN FLAT BAD: A LITTLE
MOBILITY SCORE: 24
DRESSING REGULAR LOWER BODY CLOTHING: A LITTLE
MOBILITY SCORE: 18
TOILETING: A LITTLE
DAILY ACTIVITIY SCORE: 22
STANDING UP FROM CHAIR USING ARMS: A LITTLE
MOVING TO AND FROM BED TO CHAIR: A LITTLE
WALKING IN HOSPITAL ROOM: A LITTLE
MOVING FROM LYING ON BACK TO SITTING ON SIDE OF FLAT BED: A LITTLE
SUGGESTED CMS G CODE MODIFIER MOBILITY: CK
SUGGESTED CMS G CODE MODIFIER DAILY ACTIVITY: CJ
SUGGESTED CMS G CODE MODIFIER MOBILITY: CH
CLIMB 3 TO 5 STEPS WITH RAILING: A LITTLE

## 2024-05-23 ASSESSMENT — PATIENT HEALTH QUESTIONNAIRE - PHQ9
2. FEELING DOWN, DEPRESSED, IRRITABLE, OR HOPELESS: NOT AT ALL
SUM OF ALL RESPONSES TO PHQ9 QUESTIONS 1 AND 2: 0
SUM OF ALL RESPONSES TO PHQ9 QUESTIONS 1 AND 2: 0
2. FEELING DOWN, DEPRESSED, IRRITABLE, OR HOPELESS: NOT AT ALL
1. LITTLE INTEREST OR PLEASURE IN DOING THINGS: NOT AT ALL
1. LITTLE INTEREST OR PLEASURE IN DOING THINGS: NOT AT ALL

## 2024-05-23 ASSESSMENT — PAIN DESCRIPTION - PAIN TYPE
TYPE: ACUTE PAIN

## 2024-05-23 ASSESSMENT — GAIT ASSESSMENTS
GAIT LEVEL OF ASSIST: SUPERVISED
DISTANCE (FEET): 100
DEVIATION: STEP TO

## 2024-05-23 NOTE — PROGRESS NOTES
Hospital Medicine Daily Progress Note    Date of Service  5/23/2024    Chief Complaint  Melva Sauceda is a 58 y.o. female admitted 5/23/2024 with low back pain which started a few days prior to admission.  She has a history of metastatic breast cancer with lesions in the spine.  PET scan 4/25/2024 which reportedly showed no metastatic disease in the lumbar spine or pelvic area.    Hospital Course  On admission, patient was afebrile and hemodynamically stable.  Labs were unremarkable. She was started on a lidocaine patch.  MRI back was ordered.    Interval Problem Update  MRI lumbar spine and pelvis show degenerative disease with no evidence of marrow edema or fracture.  Patient has pain with movement, prednisone and Robaxin added. Afebrile, stable blood pressure.     I have discussed this patient's plan of care and discharge plan at IDT rounds today with Case Management, Nursing, Nursing leadership, and other members of the IDT team.    Consultants/Specialty  None    Code Status  Full Code    Disposition  The patient is not medically cleared for discharge to home or a post-acute facility.  Anticipate discharge to: home with close outpatient follow-up    I have placed the appropriate orders for post-discharge needs.    Review of Systems  Review of Systems   Constitutional:  Positive for malaise/fatigue.   HENT: Negative.     Eyes: Negative.    Respiratory: Negative.     Cardiovascular: Negative.    Gastrointestinal: Negative.    Genitourinary: Negative.    Musculoskeletal:  Positive for back pain.   Skin: Negative.    Neurological:  Negative for weakness.   Endo/Heme/Allergies: Negative.    Psychiatric/Behavioral: Negative.          Physical Exam  Temp:  [36.1 °C (97 °F)-36.6 °C (97.9 °F)] 36.5 °C (97.7 °F)  Pulse:  [53-68] 68  Resp:  [16-17] 17  BP: (131-149)/() 134/102  SpO2:  [90 %-97 %] 94 %    Physical Exam  Constitutional:       General: She is not in acute distress.  HENT:      Head: Normocephalic.       Nose: Nose normal.      Mouth/Throat:      Mouth: Mucous membranes are moist.   Eyes:      Pupils: Pupils are equal, round, and reactive to light.   Cardiovascular:      Rate and Rhythm: Normal rate.   Pulmonary:      Effort: Pulmonary effort is normal.   Abdominal:      Palpations: Abdomen is soft.   Musculoskeletal:      Cervical back: Normal range of motion.      Right lower leg: No edema.      Left lower leg: No edema.   Skin:     General: Skin is warm.   Neurological:      General: No focal deficit present.      Mental Status: She is alert.   Psychiatric:         Mood and Affect: Mood normal.         Fluids  No intake or output data in the 24 hours ending 05/23/24 1335    Laboratory  Recent Labs     05/23/24  0823   WBC 6.6   RBC 4.10*   HEMOGLOBIN 13.0   HEMATOCRIT 39.9   MCV 97.3   MCH 31.7   MCHC 32.6   RDW 45.9   PLATELETCT 231   MPV 8.7*     Recent Labs     05/23/24  0823   SODIUM 137   POTASSIUM 4.3   CHLORIDE 102   CO2 26   GLUCOSE 123*   BUN 22   CREATININE 0.56   CALCIUM 9.1                   Imaging  MR-LUMBAR SPINE-W/O   Final Result      Mild degenerative disease in the lumbar spine as described above.      MR-PELVIS W/O   Final Result      1.  No evidence of marrow edema or fracture.      2.  Degenerative disc disease involving the lumbar spine.      3.  Degeneration and ossification of the acetabular labrum bilaterally.      4.  Mild tendinosis of the gluteus medius and minimus tendons bilaterally.      5.  Mild bilateral adductor muscle edema suggesting strain.           Assessment/Plan  * Lower back pain- (present on admission)  Assessment & Plan  MRI lumbar spine and pelvis show degenerative disease with no evidence of marrow edema or fracture.  Patient has pain with movement, prednisone and Robaxin added.    Primary malignant neoplasm of left female breast (HCC)- (present on admission)  Assessment & Plan  She has a history of metastatic breast cancer with lesions in the spine.  PET scan  4/25/2024 which reportedly showed no metastatic disease in the lumbar spine or pelvic area. MRI spine ordered.  Continue Arimidex    Dyslipidemia- (present on admission)  Assessment & Plan  Continue home rosuvastatin         VTE prophylaxis: Lovenox

## 2024-05-23 NOTE — PROGRESS NOTES
4 Eyes Skin Assessment Completed by CAMMY Babcock and CAMMY Calderón.    Head WDL  Ears WDL  Nose WDL  Mouth scab to lips  Neck WDL  Breast/Chest port to right chest  Shoulder Blades WDL  Spine WDL  (R) Arm/Elbow/Hand WDL  (L) Arm/Elbow/Hand WDL  Abdomen WDL  Groin WDL  Scrotum/Coccyx/Buttocks WDL  (R) Leg WDL  (L) Leg WDL  (R) Heel/Foot/Toe WDL  (L) Heel/Foot/Toe WDL          Devices In Places Pulse Ox      Interventions In Place N/A    Possible Skin Injury No    Pictures Uploaded Into Epic N/A  Wound Consult Placed N/A  RN Wound Prevention Protocol Ordered No

## 2024-05-23 NOTE — ED PROVIDER NOTES
ED Provider Note    CHIEF COMPLAINT  Chief Complaint   Patient presents with    Low Back Pain     Pt ran a 23 miles 3 weeks ago and a decided to take a break from running and started to bike. Pt comes in today with severe low back pain. Pt has no urinary, bowel, fever or n/v. Pt did try to take tramadol and gabapentin but did not work         HPI    Primary care provider: Pcp Pt States None   History obtained from: Patient  History limited by: None     Melva Sauceda is a 58 y.o. female who presents to the ED complaining of severe pain across her lower back.  Patient reports that she was riding her bike over the past few days and did have some low back pain but woke up this morning with severe pain.  No radiation/incontinence/saddle anesthesia/weakness or sensory change.  No fever/shortness of breath or difficulty breathing/nausea/vomiting/diarrhea/constipation/dysuria.  She denies pain anywhere else.  She had some leftover tramadol which she took without improvement.  She is also on gabapentin.    REVIEW OF SYSTEMS  Please see HPI for pertinent positives/negatives.  All other systems reviewed and are negative.     PAST MEDICAL HISTORY  Past Medical History:   Diagnosis Date    COVID-19 06/11/2022    Cancer (HCC) 12/2021    Breast cancer - left    Cataract     H/O OU    Detached retina, right     H/O    PONV (postoperative nausea and vomiting)     Prediabetes         SURGICAL HISTORY  Past Surgical History:   Procedure Laterality Date    PB MASTECTOMY, PARTIAL Left 7/12/2022    Procedure: LEFT ELIAN  LOCALIZED PARTIAL MASTECTOMY, LEFT SENTINEL NODE BIOPSY, POSSIBLE AXILLARY DISSECTION, BIOABSORBABLE FIDUCIAL MARKER PLACEMENT;  Surgeon: Shane Jorge M.D.;  Location: [facility];  Service: General    NODE BIOPSY SENTINEL Left 7/12/2022    Procedure: BIOPSY, LYMPH NODE, SENTINEL;  Surgeon: Shane Jorge M.D.;  Location: [facility];   "Service: General    AXILLARY NODE DISSECTION Left 2022    Procedure: LYMPHADENECTOMY,  AXILLARY;  Surgeon: Shane Jorge M.D.;  Location: [facility];  Service: General    NJ ULTRASONIC GUIDANCE, INTRAOPERATIVE Right 2021    Procedure: ULTRASOUND GUIDANCE;  Surgeon: Shane Jorge M.D.;  Location: [facility];  Service: General    CATH PLACEMENT Right 2021    Procedure: INSERTION, CATHETER - PORT A;  Surgeon: Shane Jorge M.D.;  Location: [facility];  Service: General    RETINAL DETACHMENT REPAIR Right     BUNIONECTOMY Right     CATARACT EXTRACTION WITH IOL Left     GYN SURGERY           LUMPECTOMY Bilateral     >20 years    OTHER      Cataract Extraction OD        SOCIAL HISTORY  Social History     Tobacco Use    Smoking status: Never    Smokeless tobacco: Never   Vaping Use    Vaping status: Never Used   Substance and Sexual Activity    Alcohol use: Yes    Drug use: Never    Sexual activity: Not Currently        FAMILY HISTORY  Family History   Problem Relation Age of Onset    Cancer Mother         breast and bladder    Hypertension Mother     Arrythmia Mother         CURRENT MEDICATIONS  Home Medications       Reviewed by Abdullahi Johnson R.N. (Registered Nurse) on 24 at 0249  Med List Status: Partial     Medication Last Dose Status   anastrozole (ARIMIDEX) 1 MG Tab  Active   Cholecalciferol (VITAMIN D3) 1.25 MG (65611 UT) Cap  Active   clobetasol (TEMOVATE) 0.05 % Cream  Active   gabapentin (NEURONTIN) 300 MG Cap  Active   ibuprofen (MOTRIN) 800 MG Tab  Active   Levocetirizine Dihydrochloride 5 MG Tab  Active   Pertuzumab (PERJETA IV)  Active   rosuvastatin (CRESTOR) 10 MG Tab  Active   Trastuzumab (HERCEPTIN IV)  Active                     ALLERGIES  No Known Allergies     PHYSICAL EXAM  VITAL SIGNS: BP (!) 149/77   Pulse (!) 53   Temp 36.2 °C (97.1 °F) (Temporal)   Resp 16   Ht 1.727 m (5' 8\")   Wt 74.8 kg (165 lb)   SpO2 96%   BMI " 25.09 kg/m²  @RETA[[id number]::@     Pulse ox interpretation: 97% I interpret this pulse ox as normal     Constitutional: Well developed, well nourished, alert in no apparent distress, nontoxic appearance    HENT: No external signs of trauma, normocephalic  Eyes: PERRL, conjunctiva without erythema, no discharge, no icterus    Neck: Soft and supple, trachea midline, no stridor, no tenderness, no LAD, good ROM    Cardiovascular: Regular rate and rhythm, no murmurs/rubs/gallops, strong distal pulses and good perfusion    Thorax & Lungs: No respiratory distress, CTAB    Abdomen: Soft, nontender, nondistended, no guarding, no rebound, normal BS    Back: Normal inspection, no point midline tenderness to palpation, no CVAT, tenderness across lower lumbar region, straight leg raising negative bilaterally, 5/5 strength bilateral lower extremities, sensation intact to touch throughout, DTRs 0/4 bilateral lower extremities     Extremities: No cyanosis, no edema, no gross deformity, intact distal pulses with brisk cap refill    Skin: Warm, dry, no pallor/cyanosis, no rash noted      Neuro: A/O times 3, no focal deficits noted    Psychiatric: Cooperative, normal mood and affect, normal judgement, appropriate for clinical situation        DIAGNOSTIC STUDIES / PROCEDURES        LABS  All labs reviewed by me.     Results for orders placed or performed during the hospital encounter of 02/13/24   VZV PCR   Result Value Ref Range    Specimen Source Serum     Varicella-Zoster Virus by PCR Not Detected         RADIOLOGY  I have independently interpreted the diagnostic imaging associated with this visit and am waiting the final reading from the radiologist.     No orders to display          COURSE & MEDICAL DECISION MAKING  Nursing notes, VS, PMSFHx reviewed in chart.     Review of past medical records shows the patient was last seen in this ED May 13, 2024 for left knee and calf pain and left lower extremity ultrasound without evidence for  DVT while left knee x-ray showed mild osteoarthrosis and joint effusion.  Patient was seen in the office by orthopedics on April 24, 2024 regarding benign neoplasm of soft tissues of lower limb and hypertrophy of fat pad of right knee.      Differential diagnoses considered include but are not limited to: Strain/sprain, dissection/AAA, cauda equina syndrome, DDD, herniated disc, spinal stenosis, epidural abscess/mass, osteomyelitis, spinal hematoma      ED Observation Status? Yes; I am placing the patient in to an observation status due to a diagnostic uncertainty as well as therapeutic intensity. Patient placed in observation status at 2:55 AM, 5/23/2024.     Observation plan is as follows: We will treat patient with pain medicine and monitor patient in the ED for improvement.    Upon Reevaluation, the patient's condition has: not improved; and will be escalated to hospitalization.    Patient discharged from ED Observation status at 0552 on May 23, 2024.      INITIAL ASSESSMENT AND PLAN  Care Narrative: This is a 58-year-old female patient with medical history including breast cancer, prediabetes, detached retina who presents to the ED complaining of atraumatic low back pain.  No radiation or other symptoms.  Will treat patient with pain medicine and closely monitor patient in the ED for improvement.      Discussion of management with other [facility] or appropriate source(s): Hospitalist    0552: D/W Dr. Solis, hospitalist, who will admit patient      History and physical exam as above.  After initial history/exam, I have low clinical suspicion for infectious process such as abscess/osteomyelitis or acute cord syndrome/cauda equina or dissection/AAA.  Patient was treated in the ED with Dilaudid, Lidoderm patch, Zofran and Flexeril and reports only minimal improvement.  She reports significant pain with movement and would like to be admitted.  I also discussed with patient obtaining MRI given her history of breast cancer  with previous metastasis to her thoracic spine.  I think this can be accomplished while she is admitted.  I discussed with Dr. Solis who graciously agreed to admit patient.        FINAL IMPRESSION  1. Acute bilateral low back pain without sciatica Acute          DISPOSITION  Patient will be admitted by hospitalist for further care      Electronically signed by: Milind Watters D.O., 5/23/2024 2:33 AM      Portions of this record were made with voice recognition software.  Despite my review, errors may remain.  Please interpret this chart in the appropriate context.

## 2024-05-23 NOTE — PROGRESS NOTES
Pharmacy Medication Reconciliation      ~Medication reconciliation updated and complete per patient at bedside   ~Allergies have been verified  ~No oral ABX within the last 30 days  ~Patient home pharmacy :  [facility]      ~Anticoagulants (rivaroxaban, apixaban, edoxaban, dabigatran, warfarin, enoxaparin) taken in the last 14 days? No

## 2024-05-23 NOTE — ASSESSMENT & PLAN NOTE
MRI lumbar spine and pelvis show degenerative disease with no evidence of marrow edema or fracture.  Patient has pain with movement, prednisone and Robaxin added.

## 2024-05-23 NOTE — PROGRESS NOTES
Received bedside report from NOC RN, assumed care of patient. Patient is AO4 on room air, no signs of distress. Denies pain at this time, awaiting port access. Call light is within reach, bed locked in lowest position, hourly rounding in place.

## 2024-05-23 NOTE — ED NOTES
Pt complains of lower back pain x1 days. Pt explains they rode their bike for a couple days, and notice the back pain. Yesterday they woke up with excruciating back pain.

## 2024-05-23 NOTE — THERAPY
Physical Therapy   Initial Evaluation     Patient Name: Melva Sauceda  Age:  58 y.o., Sex:  female  Medical Record #: [id number]  Today's Date: 5/23/2024          Assessment  Patient is 58 y.o. female with a diagnosis of bi lat gluteal pain.Pt lives at home alone and is very active.Pt c/o pain bi lat gluteals with lying to sitting in particular,very little pain with ambulating.Pt is safe with bd mob,transfers and ambulation.She has no equipment needs      Plan    DC Equipment Recommendations: (P) None  Discharge Recommendations: (P) Recommend outpatient physical therapy services to address higher level deficits        Objective       05/23/24 1600   Charge Group   PT Evaluation PT Evaluation Mod   Total Time Spent   PT Evaluation Time Spent (Mins) 30   Initial Contact Note    Initial Contact Note Order Received and Verified, Physical Therapy Evaluation in Progress with Full Report to Follow.   Prior Living Situation   Prior Services None   Housing / Facility 2 Story House   Steps In Home 10   Equipment Owned None   Lives with - Patient's Self Care Capacity Alone and Able to Care For Self   Prior Level of Functional Mobility   Bed Mobility Independent   Transfer Status Independent   Ambulation Independent   Ambulation Distance community amb   Assistive Devices Used None   Stairs Independent   History of Falls   History of Falls No   Cognition    Cognition / Consciousness WDL   Passive ROM Lower Body   Passive ROM Lower Body WDL   Active ROM Lower Body    Active ROM Lower Body  WDL   Strength Lower Body   Lower Body Strength  Not Tested   Coordination Lower Body    Coordination Lower Body  WDL   Balance Assessment   Sitting Balance (Static) Fair +   Sitting Balance (Dynamic) Fair +   Standing Balance (Static) Fair +   Standing Balance (Dynamic) Fair +   Weight Shift Sitting Fair   Weight Shift Standing Good   Bed Mobility    Supine to Sit Modified Independent   Sit to Supine Modified Independent   Scooting Modified  Independent   Gait Analysis   Gait Level Of Assist Supervised   Assistive Device None   Distance (Feet) 100   # of Times Distance was Traveled 2   Deviation Step To   # of Stairs Climbed 0   Weight Bearing Status full   Functional Mobility   Sit to Stand Supervised   Bed, Chair, Wheelchair Transfer Supervised   Transfer Method Stand Step   6 Clicks Assessment - How much HELP from from another person do you currently need... (If the patient hasn't done an activity recently, how much help from another person do you think he/she would need if he/she tried?)   Turning from your back to your side while in a flat bed without using bedrails? 4   Moving from lying on your back to sitting on the side of a flat bed without using bedrails? 4   Moving to and from a bed to a chair (including a wheelchair)? 4   Standing up from a chair using your arms (e.g., wheelchair, or bedside chair)? 4   Walking in hospital room? 4   Climbing 3-5 steps with a railing? 4   6 clicks Mobility Score 24   Activity Tolerance   Sitting Edge of Bed 10   Standing 8   Patient / Family Goals    Patient / Family Goal #1 Home   Anticipated Discharge Equipment and Recommendations   DC Equipment Recommendations None   Discharge Recommendations Recommend outpatient physical therapy services to address higher level deficits   Interdisciplinary Plan of Care Collaboration   IDT Collaboration with  Nursing   Session Information   Date / Session Number  5/23   Priority 0

## 2024-05-23 NOTE — ASSESSMENT & PLAN NOTE
She has a history of metastatic breast cancer with lesions in the spine.  PET scan 4/25/2024 which reportedly showed no metastatic disease in the lumbar spine or pelvic area. MRI spine ordered.  Continue Arimidex

## 2024-05-23 NOTE — CARE PLAN
The patient is Stable - Low risk of patient condition declining or worsening    Shift Goals  Clinical Goals: Free from falls, ambulate with comfort  Patient Goals: Resolve back pain    Progress made toward(s) clinical / shift goals:  Patient is free from falls, fall precautions observe. PT evaluation ongoing.Patient reports no pain while at rest.     Patient is not progressing towards the following goals:    Problem: Pain - Standard  Goal: Alleviation of pain or a reduction in pain to the patient’s comfort goal  Outcome: Progressing     Problem: Knowledge Deficit - Standard  Goal: Patient and family/care givers will demonstrate understanding of plan of care, disease process/condition, diagnostic tests and medications  Outcome: Progressing     Problem: Fall Risk  Goal: Patient will remain free from falls  Outcome: Progressing

## 2024-05-23 NOTE — H&P
Hospital Medicine History & Physical Note    Date of Service  5/23/2024    Primary Care Physician  Pcp Pt States None    Consultants  None    Specialist Names: None    Code Status  Full Code    Chief Complaint  Chief Complaint   Patient presents with    Low Back Pain     Pt ran a 23 miles 3 weeks ago and a decided to take a break from running and started to bike. Pt comes in today with severe low back pain. Pt has no urinary, bowel, fever or n/v. Pt did try to take tramadol and gabapentin but did not work        History of Presenting Illness  Melva Sauceda is a 58 y.o. female who presented 5/23/2024 with lower back pain.  Patient states she was in her usual state of health until the last couple of days when she began having lower back/pelvic pain.  She describes it as stabbing, severe frequently.  She states as long as she lays still with no movement there is no pain but as soon as she even goes to shift her pelvis there is severe pain right now.  No significant improvement with pain meds or antispasmodics.  Patient is very active, she did recently run a marathon, has recently started biking, did bike all weekend.  She is a breast cancer survivor with a history of metastatic disease to the thoracic spine.  She states she did have a PET scan 4 weeks ago, no metastatic disease to the lumbar spine or pelvic area noted.  I did discuss the case including labs and imaging with the ER physician.    I discussed the plan of care with patient.    Review of Systems  Review of Systems   Constitutional:  Negative for chills, fever and malaise/fatigue.   HENT:  Negative for congestion.    Respiratory:  Negative for cough, sputum production, shortness of breath and stridor.    Cardiovascular:  Negative for chest pain, palpitations and leg swelling.   Gastrointestinal:  Negative for abdominal pain, constipation, diarrhea, nausea and vomiting.   Genitourinary:  Negative for dysuria and urgency.   Musculoskeletal:  Positive for  back pain. Negative for falls and myalgias.   Neurological:  Negative for dizziness, tingling, sensory change, focal weakness, loss of consciousness, weakness and headaches.   Psychiatric/Behavioral:  Negative for depression and suicidal ideas.    All other systems reviewed and are negative.      Past Medical History   has a past medical history of Cancer (HCC) (12/2021), Cataract, COVID-19 (06/11/2022), Detached retina, right, PONV (postoperative nausea and vomiting), and Prediabetes.    Surgical History   has a past surgical history that includes bunionectomy (Right); pr ultrasonic guidance, intraoperative (Right, 12/31/2021); cath placement (Right, 12/31/2021); cataract extraction with iol (Left); gyn surgery; lumpectomy (Bilateral); other; retinal detachment repair (Right, 2014); pr mastectomy, partial (Left, 7/12/2022); node biopsy sentinel (Left, 7/12/2022); and axillary node dissection (Left, 7/12/2022).     Family History  family history includes Arrythmia in her mother; Cancer in her mother; Hypertension in her mother.   Family history reviewed with patient. There is no family history that is pertinent to the chief complaint.     Social History   reports that she has never smoked. She has never used smokeless tobacco. She reports current alcohol use. She reports that she does not use drugs.    Allergies  No Known Allergies    Medications  Prior to Admission Medications   Prescriptions Last Dose Informant Patient Reported? Taking?   Cholecalciferol (VITAMIN D3) 1.25 MG (30888 UT) Cap   Yes No   Sig: Take 1 Capsule by mouth 1 time a day as needed.   Levocetirizine Dihydrochloride 5 MG Tab   No No   Sig: Take 1 tablet by mouth at bedtime for itching.   Pertuzumab (PERJETA IV)   Yes No   Sig: Infuse  into a venous catheter.   Trastuzumab (HERCEPTIN IV)   Yes No   Sig: Infuse  into a venous catheter.   anastrozole (ARIMIDEX) 1 MG Tab   No No   Sig: Take 1 tablet by mouth daily   clobetasol (TEMOVATE) 0.05 %  Cream   No No   Sig: Apply twice a day to itchy areas on right arm, right neck and left shoulder. Once better stop. Repeat as needed   gabapentin (NEURONTIN) 300 MG Cap   No No   Sig: Take 1 capsule by mouth twice a day with food. IF no improvement in itching in 1 week you can increase to three times a day   ibuprofen (MOTRIN) 800 MG Tab   No No   Sig: Take 1 Tablet by mouth every 8 hours as needed for Mild Pain.   rosuvastatin (CRESTOR) 10 MG Tab   No No   Sig: Take 1 Tablet by mouth every evening.      Facility-Administered Medications: None       Physical Exam  Temp:  [36.1 °C (97 °F)-36.2 °C (97.1 °F)] 36.2 °C (97.1 °F)  Pulse:  [53-67] 53  Resp:  [16-17] 16  BP: (143-149)/(77-84) 149/77  SpO2:  [96 %-97 %] 96 %  Blood Pressure: (!) 149/77   Temperature: 36.2 °C (97.1 °F)   Pulse: (!) 53   Respiration: 16   Pulse Oximetry: 96 %       Physical Exam  Vitals and nursing note reviewed.   Constitutional:       General: She is not in acute distress.     Appearance: She is well-developed. She is not diaphoretic.   HENT:      Head: Normocephalic and atraumatic.      Right Ear: External ear normal.      Left Ear: External ear normal.      Nose: Nose normal. No congestion or rhinorrhea.      Mouth/Throat:      Mouth: Mucous membranes are moist.      Pharynx: No oropharyngeal exudate.   Eyes:      General:         Right eye: No discharge.         Left eye: No discharge.   Neck:      Trachea: No tracheal deviation.   Cardiovascular:      Rate and Rhythm: Normal rate and regular rhythm.      Heart sounds: No murmur heard.     No friction rub. No gallop.   Pulmonary:      Effort: Pulmonary effort is normal. No respiratory distress.      Breath sounds: Normal breath sounds. No stridor. No wheezing or rales.   Chest:      Chest wall: No tenderness.   Abdominal:      General: Bowel sounds are normal. There is no distension.      Palpations: Abdomen is soft.      Tenderness: There is no abdominal tenderness.   Musculoskeletal:     "  Cervical back: Neck supple.      Right lower leg: No edema.      Left lower leg: No edema.      Comments: Lower back/pelvic pain with movement of LEs   Lymphadenopathy:      Cervical: No cervical adenopathy.   Skin:     General: Skin is warm and dry.      Findings: No erythema or rash.   Neurological:      Mental Status: She is alert and oriented to person, place, and time.      Cranial Nerves: No cranial nerve deficit.   Psychiatric:         Mood and Affect: Mood normal.         Behavior: Behavior normal.         Thought Content: Thought content normal.         Judgment: Judgment normal.         Laboratory:          No results for input(s): \"ALTSGPT\", \"ASTSGOT\", \"ALKPHOSPHAT\", \"TBILIRUBIN\", \"DBILIRUBIN\", \"GAMMAGT\", \"AMYLASE\", \"LIPASE\", \"ALB\", \"PREALBUMIN\", \"GLUCOSE\" in the last 72 hours.      No results for input(s): \"NTPROBNP\" in the last 72 hours.      No results for input(s): \"TROPONINT\" in the last 72 hours.    Imaging:  MR-LUMBAR SPINE-W/O    (Results Pending)       no X-Ray or EKG requiring interpretation    Assessment/Plan:  Justification for Admission Status  I anticipate this patient is appropriate for observation status at this time because lower back pain    Patient will need a Med/Surg bed on MEDICAL service .  The need is secondary to lower back pain.    * Lower back pain- (present on admission)  Assessment & Plan  Lower back as well as bilateral pelvic pain  Severe at times especially with movement  Start as needed Tylenol as well as narcotics  Start as needed Flexeril  Start lidocaine patch  Due to her history of metastatic breast cancer, additional imaging will be obtained, will get an MRI of the lumbar spine as well as an MRI of the pelvis  Reassuringly, she has no radiation down her leg or loss of sensation    Dyslipidemia- (present on admission)  Assessment & Plan  Continue home statin    Primary malignant neoplasm of left female breast (HCC)- (present on admission)  Assessment & Plan  History " of with metastatic disease to the spine  Await MRI        VTE prophylaxis: SCDs/TEDs

## 2024-05-23 NOTE — ED TRIAGE NOTES
"Chief Complaint   Patient presents with    Low Back Pain     Pt ran a 23 miles 3 weeks ago and a decided to take a break from running and started to bike. Pt comes in today with severe low back pain. Pt has no urinary, bowel, fever or n/v. Pt did try to take tramadol and gabapentin but did not work      BP (!) 143/84   Pulse 67   Temp 36.1 °C (97 °F) (Temporal)   Resp 17   Ht 1.727 m (5' 8\")   Wt 74.8 kg (165 lb)   SpO2 97%   BMI 25.09 kg/m²       "

## 2024-05-24 ENCOUNTER — PHARMACY VISIT (OUTPATIENT)
Dept: PHARMACY | Facility: MEDICAL CENTER | Age: 59
End: 2024-05-24
Payer: COMMERCIAL

## 2024-05-24 VITALS
RESPIRATION RATE: 16 BRPM | HEART RATE: 65 BPM | SYSTOLIC BLOOD PRESSURE: 149 MMHG | BODY MASS INDEX: 25.01 KG/M2 | TEMPERATURE: 98.2 F | DIASTOLIC BLOOD PRESSURE: 69 MMHG | WEIGHT: 165 LBS | OXYGEN SATURATION: 94 % | HEIGHT: 68 IN

## 2024-05-24 LAB
ANION GAP SERPL CALC-SCNC: 13 MMOL/L (ref 7–16)
BUN SERPL-MCNC: 24 MG/DL (ref 8–22)
CALCIUM SERPL-MCNC: 9.3 MG/DL (ref 8.4–10.2)
CHLORIDE SERPL-SCNC: 105 MMOL/L (ref 96–112)
CHOLEST SERPL-MCNC: 231 MG/DL (ref 100–199)
CO2 SERPL-SCNC: 22 MMOL/L (ref 20–33)
CREAT SERPL-MCNC: 0.61 MG/DL (ref 0.5–1.4)
ERYTHROCYTE [DISTWIDTH] IN BLOOD BY AUTOMATED COUNT: 45.1 FL (ref 35.9–50)
GFR SERPLBLD CREATININE-BSD FMLA CKD-EPI: 102 ML/MIN/1.73 M 2
GLUCOSE SERPL-MCNC: 135 MG/DL (ref 65–99)
HCT VFR BLD AUTO: 38.5 % (ref 37–47)
HDLC SERPL-MCNC: 89 MG/DL
HGB BLD-MCNC: 12.9 G/DL (ref 12–16)
LDLC SERPL CALC-MCNC: 133 MG/DL
MCH RBC QN AUTO: 32 PG (ref 27–33)
MCHC RBC AUTO-ENTMCNC: 33.5 G/DL (ref 32.2–35.5)
MCV RBC AUTO: 95.5 FL (ref 81.4–97.8)
PLATELET # BLD AUTO: 266 K/UL (ref 164–446)
PMV BLD AUTO: 9 FL (ref 9–12.9)
POTASSIUM SERPL-SCNC: 4.3 MMOL/L (ref 3.6–5.5)
RBC # BLD AUTO: 4.03 M/UL (ref 4.2–5.4)
SODIUM SERPL-SCNC: 140 MMOL/L (ref 135–145)
TRIGL SERPL-MCNC: 46 MG/DL (ref 0–149)
WBC # BLD AUTO: 7.5 K/UL (ref 4.8–10.8)

## 2024-05-24 PROCEDURE — 99239 HOSP IP/OBS DSCHRG MGMT >30: CPT | Performed by: INTERNAL MEDICINE

## 2024-05-24 PROCEDURE — RXMED WILLOW AMBULATORY MEDICATION CHARGE: Performed by: INTERNAL MEDICINE

## 2024-05-24 RX ORDER — PREDNISONE 20 MG/1
TABLET ORAL
Qty: 9 TABLET | Refills: 0 | Status: SHIPPED | OUTPATIENT
Start: 2024-05-25 | End: 2024-05-30

## 2024-05-24 RX ORDER — METHOCARBAMOL 500 MG/1
500 TABLET, FILM COATED ORAL 4 TIMES DAILY PRN
Qty: 20 TABLET | Refills: 0 | Status: SHIPPED | OUTPATIENT
Start: 2024-05-24 | End: 2024-05-30

## 2024-05-24 RX ADMIN — METHOCARBAMOL 500 MG: 500 TABLET ORAL at 08:05

## 2024-05-24 RX ADMIN — ANASTROZOLE 1 MG: 1 TABLET, FILM COATED ORAL at 05:18

## 2024-05-24 RX ADMIN — HEPARIN 500 UNITS: 100 SYRINGE at 11:42

## 2024-05-24 RX ADMIN — LIDOCAINE 1 PATCH: 4 PATCH TOPICAL at 05:17

## 2024-05-24 RX ADMIN — PREDNISONE 60 MG: 50 TABLET ORAL at 05:17

## 2024-05-24 ASSESSMENT — COGNITIVE AND FUNCTIONAL STATUS - GENERAL
DAILY ACTIVITIY SCORE: 24
SUGGESTED CMS G CODE MODIFIER DAILY ACTIVITY: CH

## 2024-05-24 ASSESSMENT — PAIN DESCRIPTION - PAIN TYPE: TYPE: ACUTE PAIN

## 2024-05-24 ASSESSMENT — ACTIVITIES OF DAILY LIVING (ADL): TOILETING: INDEPENDENT

## 2024-05-24 NOTE — PROGRESS NOTES
Assumed care of pt at 1915, bedside report received from CAMMY Babcock. Pt is AAOx 4, ambulating in hallways without difficulties. No complaints at this time.    Pt using call light appropriately. Discussed plan of care for the night with patient, bed in lowest position, call light in reach, personal belongings in reach.

## 2024-05-24 NOTE — PROGRESS NOTES
Pt discharged to home via personal vehicle. Discharge paperwork reviewed and signed. Prescribed home medications reviewed with pt per discharge summary. [id number] delivery complete. R chest port de-accessed and flushed per policy. VSS. Pt escorted off unit with hospital staff escort.

## 2024-05-24 NOTE — THERAPY
Occupational Therapy   Initial Evaluation     Patient Name: Melva Sauceda  Age:  58 y.o., Sex:  female  Medical Record #: [id number]  Today's Date: 5/24/2024     Assessment  Patient is 58 y.o. female with a diagnosis of LBP. B gluteus medius strains. Pt drove self to ED; indep and active prior. Marathon runner. Reports LBP pain has improved, most difficult motions are in/out of bed and standing from low surface. Recommended to sit on higher surfaces including toilet (may obtain RTS), use her tongs (grabber) as much as needed. Pt lives alone, safe for dc.       Plan  Eval only.     Subjective  Pleasant and cooperative.     Objective   05/24/24 0820   Prior Living Situation   Prior Services Home-Independent   Housing / Facility 2 Story Apartment / Condo   Bathroom Set up Walk In Shower   Equipment Owned Long Handled Shoehorn;Reacher   Lives with - Patient's Self Care Capacity Alone and Able to Care For Self   Comments active prior; runs marathons   Prior Level of ADL Function   Self Feeding Independent   Grooming / Hygiene Independent   Bathing Independent   Dressing Independent   Toileting Independent   Prior Level of IADL Function   Medication Management Independent   Laundry Independent   Kitchen Mobility Independent   Finances Independent   Home Management Independent   Shopping Independent   Prior Level Of Mobility Independent Without Device in Home   Driving / Transportation Driving Independent   Occupation (Pre-Hospital Vocational) Employed Full Time   Leisure Interests Exercise;Hobbies   History of Falls   History of Falls No   Vitals   O2 Delivery Device None - Room Air   Pain 0 - 10 Group   Location Buttock   Therapist Pain Assessment Post Activity Pain Same as Prior to Activity;Nurse Notified   Cognition    Cognition / Consciousness WDL   Comments Ox4   Passive ROM Upper Body   Passive ROM Upper Body WDL   Active ROM Upper Body   Active ROM Upper Body  WDL   Strength Upper Body   Upper Body Strength  WDL    Upper Body Muscle Tone   Upper Body Muscle Tone  WDL   Coordination Upper Body   Coordination WDL   Balance Assessment   Sitting Balance (Static) Good   Sitting Balance (Dynamic) Fair +   Standing Balance (Static) Good   Standing Balance (Dynamic) Fair +   Weight Shift Sitting Good   Weight Shift Standing Good   Bed Mobility    Supine to Sit Modified Independent   Comments extra time   ADL Assessment   Eating Independent   Grooming Independent   Upper Body Dressing Independent   Lower Body Dressing Modified Independent   How much help from another person does the patient currently need...   Putting on and taking off regular lower body clothing? 4   Bathing (including washing, rinsing, and drying)? 4   Toileting, which includes using a toilet, bedpan, or urinal? 4   Putting on and taking off regular upper body clothing? 4   Taking care of personal grooming such as brushing teeth? 4   Eating meals? 4   6 Clicks Daily Activity Score 24   Functional Mobility   Sit to Stand Supervised   Bed, Chair, Wheelchair Transfer Supervised   Comments walks in halls, no AD   Activity Tolerance   Sitting Edge of Bed 8   Standing functional   Comments pt reports no pain in standing   Education Group   Education Provided Activities of Daily Living;Spinal Precautions;Adaptive Equipment   Role of Occupational Therapist Patient Response Patient;Acceptance;Explanation;Verbal Demonstration   Spinal Precautions Patient Response Patient;Acceptance;Explanation;Verbal Demonstration   ADL Patient Response Patient;Acceptance;Explanation;Verbal Demonstration;Action Demonstration   Adaptive Equipment Patient Response Patient;Acceptance;Explanation;Verbal Demonstration   Interdisciplinary Plan of Care Collaboration   IDT Collaboration with  Nursing   Patient Position at End of Therapy In Bed;Call Light within Reach;Tray Table within Reach;Phone within Reach   Collaboration Comments PATRICIA Miranda

## 2024-05-24 NOTE — DISCHARGE SUMMARY
Discharge Summary    CHIEF COMPLAINT ON ADMISSION  Chief Complaint   Patient presents with    Low Back Pain     Pt ran a 23 miles 3 weeks ago and a decided to take a break from running and started to bike. Pt comes in today with severe low back pain. Pt has no urinary, bowel, fever or n/v. Pt did try to take tramadol and gabapentin but did not work        Reason for Admission  lower back pain     Admission Date  5/23/2024    CODE STATUS  Full Code    HPI & HOSPITAL COURSE  Melva Sauceda is a 58 y.o. female admitted 5/23/2024 with low back pain which started a few days prior to admission.  She has a history of metastatic breast cancer with lesions in the spine.  PET scan 4/25/2024 which reportedly showed no metastatic disease in the lumbar spine or pelvic area.     On admission, patient was afebrile and hemodynamically stable.  Labs were unremarkable. She was started on a lidocaine patch.  MRI lumbar spine and pelvis showed degenerative disease with no evidence of marrow edema or fracture.  Prednisone and Robaxin were started. Pain has significantly improved.     Patient has been advised to take prednisone 60 mg every morning for 3 days if her pain persists, and follow-up with the primary care physician within the next few days.  Outpatient physical therapy consult placed per patient's request.    Therefore, she is discharged in fair and stable condition to home with close outpatient follow-up.      Discharge Date  5/24/2024    FOLLOW UP ITEMS POST DISCHARGE  PCP within 2-3 days    DISCHARGE DIAGNOSES  Principal Problem:    Lower back pain (POA: Yes)  Active Problems:    Primary malignant neoplasm of left female breast (HCC) (POA: Yes)    Dyslipidemia (POA: Yes)  Resolved Problems:    * No resolved hospital problems. *      FOLLOW UP  Future Appointments   Date Time Provider Department Center   7/24/2024  8:15 AM [facility] ECHO 1 KAREEM Castaneda     Primary Care Provider    Schedule an appointment as soon as possible  for a visit  As needed    [facility]  [address]  [place] [address]    [phone]  Follow up        MEDICATIONS ON DISCHARGE     Medication List        START taking these medications        Instructions   methocarbamol 500 MG Tabs  Commonly known as: Robaxin   Take 1 Tablet by mouth 4 times a day as needed (Severe back spasms).  Dose: 500 mg     predniSONE 20 MG Tabs  Start taking on: May 25, 2024  Commonly known as: Deltasone   Take 3 tablets (60 mg) by mouth every morning for 3 days if you are still having back pain            CONTINUE taking these medications        Instructions   anastrozole 1 MG Tabs  Commonly known as: Arimidex   Take 1 tablet by mouth daily     clobetasol 0.05 % Crea  Commonly known as: Temovate   Doctor's comments: Suggested Packagin.0 Grams tube.  Apply twice a day to itchy areas on right arm, right neck and left shoulder. Once better stop. Repeat as needed     * gabapentin 100 MG Caps  Commonly known as: Neurontin   Take 100 mg by mouth 3 times a day as needed (PAIN).  Dose: 100 mg     * gabapentin 300 MG Caps  Commonly known as: Neurontin   Take 1 capsule by mouth twice a day with food. IF no improvement in itching in 1 week you can increase to three times a day     HERCEPTIN IV   Infuse 1 Each into a venous catheter every 21 days.  Dose: 1 Each     PERJETA IV   Infuse 1 Each into a venous catheter every 21 days.  Dose: 1 Each     Vitamin D3 1.25 MG (90239 UT) Caps   Take 1 Capsule by mouth every morning.  Dose: 1 Capsule           * This list has 2 medication(s) that are the same as other medications prescribed for you. Read the directions carefully, and ask your doctor or other care provider to review them with you.                STOP taking these medications      ibuprofen 800 MG Tabs  Commonly known as: Motrin     Levocetirizine Dihydrochloride 5 MG Tabs            ASK your doctor about these medications        Instructions   rosuvastatin 10  MG Tabs  Commonly known as: Crestor   Take 1 Tablet by mouth every evening.  Dose: 10 mg              Allergies  No Known Allergies    DIET  Orders Placed This Encounter   Procedures    Diet Order Diet: Regular     Standing Status:   Standing     Number of Occurrences:   1     Order Specific Question:   Diet:     Answer:   Regular [1]       ACTIVITY  As tolerated.      CONSULTATIONS  None    PROCEDURES  None    LABORATORY  Lab Results   Component Value Date    SODIUM 140 05/24/2024    POTASSIUM 4.3 05/24/2024    CHLORIDE 105 05/24/2024    CO2 22 05/24/2024    GLUCOSE 135 (H) 05/24/2024    BUN 24 (H) 05/24/2024    CREATININE 0.61 05/24/2024        Lab Results   Component Value Date    WBC 7.5 05/24/2024    HEMOGLOBIN 12.9 05/24/2024    HEMATOCRIT 38.5 05/24/2024    PLATELETCT 266 05/24/2024        Total time of the discharge process exceeds 35 minutes.

## 2024-05-24 NOTE — CARE PLAN
The patient is Stable - Low risk of patient condition declining or worsening    Shift Goals  Clinical Goals: ambulatory with no pain  Patient Goals: go home tomorrow, no pain    Progress made toward(s) clinical / shift goals:  patient having no complaints of pain during the night, ambulates multiple times without difficulties    Patient is not progressing towards the following goals:

## 2024-05-24 NOTE — PROGRESS NOTES
Bedside report received from night RN. Assumed care of patient. Daily plan of care discussed. Pt awake and alert, currently denies complaints or concerns. Pt able to ambulate without significant difficulty or intolerable pain. 12 hour chart check complete. Hourly rounding in place.     PULMONARY CLINIC NOTE      Robert Keene   : [id number]  MRN: [id number]     Date of Service: 2023    PCP: BASSAM Lema NP    Referring provider: No ref. provider found      Chief Complaint   Patient presents with    Follow-Up from [address] [place] [address] and 6 minute walk           ASSESSMENT & PLAN       67 y.o. pleasant  female patient with:    1. Snoring    2. Easy fatigability    3. Excessive daytime sleepiness         #Hospital follow-up visit  #Recent admission to the hospital for  #Cough, wheezing and shortness of breath  #Acute hypoxic respiratory failure, mild  # COPD exacerbation  #Mild pulmonary vascular congestion  #Mild basilar atelectasis  #COPD with recurrent bronchitis, mild  PFTs showed mild obstruction with air trapping and positive bronchodilator response. Normal gas diffusion. No oxygen needs in 2023  Use albuterol as needed  We will give a sample of Spiriva Respimat to try and see if that makes a difference  Discontinue Dulera due to slight increased risk for pneumonia  Call pharmacy for RSV vaccine  Bronchial hygiene measures  Aspiration precautions    #Metabolic syndrome: Diabetes, hypertension, hyperlipidemia, body habitus. S/p bypass surgery. #Sleep disordered breathing with daytime fatigue  Targeting healthy weight is encouraged. Healthy weight can help treat sleep apnea, decrease breathing difficulties and respiratory illness exacerbations. Home sleep apnea test  Sleep apnea evaluation     #HFpEF with exacerbation  #Elevated proBNP  #Mild to moderate aortic stenosis  #A-fib  #Nonobstructive coronary artery disease  #Right bundle branch block  Continue with diuresis and optimizing heart failure treatment as per cardiology and primary      #15 pack-year smoking history. Quit in . Does not qualify for lung cancer screening.          Other medical Hx: has a past medical history of CHF (congestive heart failure), NYHA class I, acute on chronic,

## 2024-05-30 ENCOUNTER — OFFICE VISIT (OUTPATIENT)
Dept: URGENT CARE | Facility: CLINIC | Age: 59
End: 2024-05-30
Payer: COMMERCIAL

## 2024-05-30 VITALS
OXYGEN SATURATION: 97 % | DIASTOLIC BLOOD PRESSURE: 70 MMHG | TEMPERATURE: 97.4 F | BODY MASS INDEX: 25.01 KG/M2 | SYSTOLIC BLOOD PRESSURE: 124 MMHG | HEART RATE: 62 BPM | WEIGHT: 165 LBS | HEIGHT: 68 IN | RESPIRATION RATE: 14 BRPM

## 2024-05-30 DIAGNOSIS — S39.013A STRAIN OF PELVIS, INITIAL ENCOUNTER: ICD-10-CM

## 2024-05-30 DIAGNOSIS — M54.50 ACUTE LOW BACK PAIN WITHOUT SCIATICA, UNSPECIFIED BACK PAIN LATERALITY: ICD-10-CM

## 2024-05-30 RX ORDER — METHOCARBAMOL 500 MG/1
1000 TABLET, FILM COATED ORAL 4 TIMES DAILY
Qty: 60 TABLET | Refills: 0 | Status: SHIPPED | OUTPATIENT
Start: 2024-05-30

## 2024-05-30 ASSESSMENT — FIBROSIS 4 INDEX: FIB4 SCORE: 0.91

## 2024-05-31 NOTE — PROGRESS NOTES
Melva Sauceda is a 58 y.o. female who presents for Pelvic Pain (Was seen in the ED for strain of Pelvic muscles, still not feeling better.)      HPI  This is a new problem. Melva Sauceda is a 58 y.o. patient who presents to urgent care with c/o: Patient having pelvic pain.  She was seen in the ER for same her pelvic muscles.  She was taking prednisone for 3 days and Robaxin.  She has run out of the Robaxin.  The Robaxin has been very helpful in reducing her discomfort.  She has her first physical therapy appointment scheduled for June 6.  She has just gotten a new primary care provider but she reports that she cannot remember her new provider's name.  She has an appointment to establish care in a few weeks.    ROS See HPI    Allergies:     No Known Allergies    PMSFS Hx:  Past Medical History:   Diagnosis Date    Cancer (HCC) 12/2021    Breast cancer - left    Cataract     H/O OU    COVID-19 06/11/2022    Detached retina, right     H/O    PONV (postoperative nausea and vomiting)     Prediabetes      Past Surgical History:   Procedure Laterality Date    PB MASTECTOMY, PARTIAL Left 7/12/2022    Procedure: LEFT ELIAN  LOCALIZED PARTIAL MASTECTOMY, LEFT SENTINEL NODE BIOPSY, POSSIBLE AXILLARY DISSECTION, BIOABSORBABLE FIDUCIAL MARKER PLACEMENT;  Surgeon: Shane Jorge M.D.;  Location: [facility];  Service: General    NODE BIOPSY SENTINEL Left 7/12/2022    Procedure: BIOPSY, LYMPH NODE, SENTINEL;  Surgeon: Shane Jorge M.D.;  Location: [facility];  Service: General    AXILLARY NODE DISSECTION Left 7/12/2022    Procedure: LYMPHADENECTOMY,  AXILLARY;  Surgeon: Shane Jorge M.D.;  Location: [facility];  Service: General    [place] ULTRASONIC GUIDANCE, INTRAOPERATIVE Right 12/31/2021    Procedure: ULTRASOUND GUIDANCE;  Surgeon: Shane Jorge M.D.;  Location: [facility];  Service: General    CATH PLACEMENT Right 12/31/2021    Procedure: INSERTION, CATHETER - PORT A;  Surgeon: Shane  MICHAEL Jorge M.D.;  Location: SUR[facility]ERY [facility];  Service: General    RETINAL DETACHMENT REPAIR Right 2014    BUNIONECTOMY Right     CATARACT EXTRACTION WITH IOL Left     GYN SURGERY           LUMPECTOMY Bilateral     >20 years    OTHER      Cataract Extraction OD     Family History   Problem Relation Age of Onset    Cancer Mother         breast and bladder    Hypertension Mother     Arrythmia Mother      Social History     Tobacco Use    Smoking status: Never    Smokeless tobacco: Never   Substance Use Topics    Alcohol use: Yes       Problems:   Patient Active Problem List   Diagnosis    Family history of breast cancer in mother    History of retinal detachment    History of cataract extraction    History of lumpectomy    History of right breast biopsy    Primary malignant neoplasm of left female breast (HCC)    Adjustment disorder with mixed anxiety and depressed mood    Carcinoma of overlapping sites of left breast in female, estrogen receptor positive (HCC)    Ankylosis of foot joint    Secondary carcinoma of bone (HCC)    Chemotherapy induced cardiomyopathy (HCC)    Mild mitral regurgitation    Lower back pain    Dyslipidemia       Medications:   Current Outpatient Medications on File Prior to Visit   Medication Sig Dispense Refill    gabapentin (NEURONTIN) 100 MG Cap Take 100 mg by mouth 3 times a day as needed (PAIN).      anastrozole (ARIMIDEX) 1 MG Tab Take 1 tablet by mouth daily 90 Tablet 0    Trastuzumab (HERCEPTIN IV) Infuse 1 Each into a venous catheter every 21 days.      Pertuzumab (PERJETA IV) Infuse 1 Each into a venous catheter every 21 days.      clobetasol (TEMOVATE) 0.05 % Cream Apply twice a day to itchy areas on right arm, right neck and left shoulder. Once better stop. Repeat as needed 30 g 0    Cholecalciferol (VITAMIN D3) 1.25 MG (94526 UT) Cap Take 1 Capsule by mouth every morning.       No current facility-administered medications on file prior to visit.        Objective:  "    /70   Pulse 62   Temp 36.3 °C (97.4 °F) (Temporal)   Resp 14   Ht 1.727 m (5' 8\")   Wt 74.8 kg (165 lb)   SpO2 97%   BMI 25.09 kg/m²     Physical Exam  Vitals and nursing note reviewed.   Constitutional:       Appearance: Normal appearance. She is normal weight.   Cardiovascular:      Rate and Rhythm: Normal rate.      Pulses: Normal pulses.   Pulmonary:      Effort: Pulmonary effort is normal.   Musculoskeletal:      Comments: Moves slowly.  Gait steady   Skin:     General: Skin is warm.      Capillary Refill: Capillary refill takes less than 2 seconds.   Neurological:      Mental Status: She is alert and oriented to person, place, and time.   Psychiatric:         Mood and Affect: Mood normal.         Behavior: Behavior normal.     MRI : Pelvis 05/23/24  IMPRESSION:     1.  No evidence of marrow edema or fracture.     2.  Degenerative disc disease involving the lumbar spine.     3.  Degeneration and ossification of the acetabular labrum bilaterally.     4.  Mild tendinosis of the gluteus medius and minimus tendons bilaterally.     5.  Mild bilateral adductor muscle edema suggesting strain.           Exam Ended: 05/23/24 10:10 AM Last Resulted: 05/23/24 11:08 AM          MRI Lumbar spine: 05/23/24  IMPRESSION:     Mild degenerative disease in the lumbar spine as described above.           Exam Ended: 05/23/24 10:10 AM Last Resulted: 05/23/24 11:13 AM          Assessment /Associated Orders:      1. Strain of pelvis, initial encounter  methocarbamol (ROBAXIN) 500 MG Tab      2. Acute low back pain without sciatica, unspecified back pain laterality  methocarbamol (ROBAXIN) 500 MG Tab          Medical Decision Making:    Melva is a very pleasant 58 y.o. female who is clinically stable at today's acute urgent care visit.  No acute distress noted.  VSS. Appropriate for outpatient care at this time.   Acute problem today with uncertain prognosis.   Keep PT appt as scheduled  Keep appt with PCP as " scheduled  Educated in proper administration of  prescription medication(s) ordered today including safety, possible SE, risks, benefits, rationale and alternatives to therapy.   Educated not to drive, drink alcohol, operate machinery, or do anything that requires mental alertness while taking RX medication that has sedation/ drowsiness as a side effect.  Allow an 8-hour wear off time before participating in any of these activities.  Ice/ heat packs prn pain     Discussed Dx, management options (risks,benefits, and alternatives to planned treatment), natural progression and supportive care.  Expressed understanding and the treatment plan was agreed upon.   Questions were encouraged and answered   Return to urgent care prn if new or worsening sx or if there is no improvement in condition prn.    Educated in Red flags and indications to immediately call 911 or present to the Emergency Department.             Please note that this dictation was created using voice recognition software. I have worked with consultants from the vendor as well as technical experts from [employer] [facility] to optimize the interface. I have made every reasonable attempt to correct obvious errors, but I expect that there are errors of grammar and possibly content that I did not discover before finalizing the note.  This note was electronically signed by provider

## 2024-06-06 ENCOUNTER — PHYSICAL THERAPY (OUTPATIENT)
Dept: PHYSICAL THERAPY | Facility: REHABILITATION | Age: 59
End: 2024-06-06
Attending: SURGERY
Payer: COMMERCIAL

## 2024-06-06 DIAGNOSIS — M54.50 ACUTE BILATERAL LOW BACK PAIN WITHOUT SCIATICA: ICD-10-CM

## 2024-06-06 PROCEDURE — 97161 PT EVAL LOW COMPLEX 20 MIN: CPT

## 2024-06-06 PROCEDURE — 97110 THERAPEUTIC EXERCISES: CPT

## 2024-06-06 PROCEDURE — 97140 MANUAL THERAPY 1/> REGIONS: CPT

## 2024-06-06 ASSESSMENT — ENCOUNTER SYMPTOMS
PAIN SCALE AT LOWEST: 0
PAIN SCALE AT HIGHEST: 10
PAIN SCALE: 0

## 2024-06-06 NOTE — OP THERAPY EVALUATION
Outpatient Physical Therapy  INITIAL EVALUATION    [facility] Physical Therapy [address]  [address]  [place] [address]  Phone:  [phone]  Fax:  [phone]    Date of Evaluation: 2024    Patient: Melva Sauceda  YOB: 1965  MRN: [id number]     Referring Provider: Alonso Adams M.D.  [address]  [address]  [place] [address]   Referring Diagnosis Malignant neoplasm of nipple and areola, left female breast [C50.012]     Time Calculation                 Chief Complaint: No chief complaint on file.    Visit Diagnoses     ICD-10-CM   1. Acute bilateral low back pain without sciatica  M54.50       Date of onset of impairment: No data found    Subjective   History of Present Illness:     History of chief complaint:  Started progressive TM training run with up to an hour on the TM w/o pain.   L knee started hurtiung and then L knee started excruciating pain.  Patient ran a marathon in May and ended up on crutches for 3 days w/ non-wt. Bearing--ran 5k week after  marathon and 2 days after she was walking with crutches--NWB for 2 days--imaging confirmed no structural damage by ortho and andat ER.  Patient started cycling thinking it waould not flare up her knee.  Patient was able to ride for 1 hour.  She woke  one morning in excruciating pain and went to ER with severe pelvic girdle pain w/o n/t or burn.after visit to ED and was admitted  and  placed on robaxin and prednisone PO with resolution  after 48 hours in hospital with max assist for toileting--  Patient cont. To 500 mg robaxin and 1 naproxen TID  daily    Prior level of function:  Nurse    Pain:     Current pain ratin    At best pain ratin    At worst pain rating:  10    Location:  Bilateral posterior lateral gluteal, denies lbp, L knee anterior to fib head--denies n/t or burn    Aggravating factors:  Sit for more than 5' w/o L knee pain  Cross legs w/o pain in L knee  Sit-stand w/o  pain  Full knee ext.  W/o pain  Down more than 1 step w/o pain  Sleep through night  Denies post gluteal pain since rest and robaxin  Walk for more than 20' w/o pain  AUBREE:16          Past Medical History:   Diagnosis Date    Cancer (HCC) 2021    Breast cancer - left    Cataract     H/O OU    COVID-19 2022    Detached retina, right     H/O    PONV (postoperative nausea and vomiting)     Prediabetes      Past Surgical History:   Procedure Laterality Date    PB MASTECTOMY, PARTIAL Left 2022    Procedure: LEFT ELIAN  LOCALIZED PARTIAL MASTECTOMY, LEFT SENTINEL NODE BIOPSY, POSSIBLE AXILLARY DISSECTION, BIOABSORBABLE FIDUCIAL MARKER PLACEMENT;  Surgeon: Shane Jorge M.D.;  Location: SURGERY [facility];  Service: General    NODE BIOPSY SENTINEL Left 2022    Procedure: BIOPSY, LYMPH NODE, SENTINEL;  Surgeon: Shane Jorge M.D.;  Location: [facility];  Service: General    AXILLARY NODE DISSECTION Left 2022    Procedure: LYMPHADENECTOMY,  AXILLARY;  Surgeon: Shane Jorge M.D.;  Location: SURGERY [facility];  Service: General    [place] ULTRASONIC GUIDANCE, INTRAOPERATIVE Right 2021    Procedure: ULTRASOUND GUIDANCE;  Surgeon: Shane Jorge M.D.;  Location: [facility];  Service: General    CATH PLACEMENT Right 2021    Procedure: INSERTION, CATHETER - PORT A;  Surgeon: Shane Jorge M.D.;  Location: [facility];  Service: General    RETINAL DETACHMENT REPAIR Right     BUNIONECTOMY Right     CATARACT EXTRACTION WITH IOL Left     GYN SURGERY           LUMPECTOMY Bilateral     >20 years    OTHER      Cataract Extraction OD       Precautions:       Objective   Observation and functional movement:  Bent knee mid-stance    Range of motion and strength:    75 squat pain// MWMS lateral glide // abolished pain    -10 125    Palpation and joint mobility:     Allodynia lateral quad and proximal ant tib, fibular head          Therapeutic Treatments and  "Modalities:     Therapeutic Treatment and Modalities Summary: Mwms with medial tibia glide squat x 10// less , not gone  Balloon smash with p/a assist into end range with mwms a/p fib glides  Desensitization with towel throughout lateral l.e.--hep  Cupping VL  and ant tib.  Kinesio tape VL and ant tib.    Full extension w/o pain or lag \" tension\"    Time-based treatments/modalities:           Assessment, Response and Plan:   Assessment details:  Patient present with patellar tendonopathy, with VL, ant. Tib and biceps femoris strain a with poor vmo recruitment and limited medial /lateral knee control with functional squatting activities( less poain with mwms w/ lateral directed tibia force).  Patient present with decreased arm swing, decrease transverse plane pelvic motion  and L bent knee midstance.  Patient demonstrates weak posterior chain  and posterio-lateral chain strength.  Patient reported severe TTP of VL, VI, fibular head and anterior tibialis.  Patient should do well if compliant with poc.     Prognosis: good    Goals:   Short Term Goals:   Sit for more than 5' w/o L knee pain  Cross legs w/o pain in L knee  Sit-stand w/o pain  Full knee ext.  W/o pain  Down more than 1 step w/o pain  Sleep through night  Walk for more than 20' w/o pain  AUBREE:<10    Short term goal time span:  2-4 weeks      Long Term Goals:    Sit unlimited  w/o L knee pain    Down more than 10 steps w/o pain    Walk for more than 60' w/o pain or jog > 15'  AUBREE:<5    Long term goal time span:  6-8 weeks    Plan:   Therapy options:  Physical therapy treatment to continue  Planned therapy interventions:  E Stim Unattended (CPT 11398), Manual Therapy (CPT 62117), Therapeutic Exercise (CPT 46996), Gait Training (CPT 33607) and Neuromuscular Re-education (CPT 97232)  Frequency:  2x week  Duration in weeks:  8  Duration in visits:  16  Plan details:  Desensitization, IASTM, cupping DN, progressive isometrics, joint mobs, MWMS, es-darius "       Functional Assessment Used        Referring provider co-signature:  I have reviewed this plan of care and my co-signature certifies the need for services.    Certification Period: 06/06/2024 to  08/08/24    Physician Signature: ________________________________ Date: ______________

## 2024-06-07 ENCOUNTER — PHYSICAL THERAPY (OUTPATIENT)
Dept: PHYSICAL THERAPY | Facility: REHABILITATION | Age: 59
End: 2024-06-07
Attending: SURGERY
Payer: COMMERCIAL

## 2024-06-07 DIAGNOSIS — R26.2 DIFFICULTY IN WALKING: ICD-10-CM

## 2024-06-07 PROCEDURE — 97110 THERAPEUTIC EXERCISES: CPT

## 2024-06-07 PROCEDURE — 97140 MANUAL THERAPY 1/> REGIONS: CPT

## 2024-06-07 PROCEDURE — 97014 ELECTRIC STIMULATION THERAPY: CPT

## 2024-06-08 NOTE — OP THERAPY DAILY TREATMENT
Outpatient Physical Therapy  DAILY TREATMENT     [facility] Physical [address].  [address]  [place] [address]  Phone:  [phone]  Fax:  [phone]    Date: 06/07/2024    Patient: Melva Sauceda  YOB: 1965  MRN: [id number]     Time Calculation    Start time: 0800  Stop time: 0910 Time Calculation (min): 70 minutes         Chief Complaint: No chief complaint on file.    Visit #: 2    SUBJECTIVE:  About the same    OBJECTIVE:  Ttp G min/med and pain with resisted abd er          Therapeutic Treatments and Modalities:     Therapeutic Treatment and Modalities Summary: Mwms supine into ext with lateral guided tibial force  MWMS L hip with caudal force in supine--unable totolerate quadruped  End range a/p knee mobs with er/it  Isometric abd clams with ir/er--hep as tolerated  Seated IR #1--to fatigue --hep    ### all hep focus on isometric to fatigue as tolerate  DN: Patient signed informed written release and verbally agreed with informed consent to procedure of dry needling   skin prep with iChlora prep  R g min/med/mad, VL, lay gastroc, FL  -TENS w/ FDN    -No adverse reactions observed post treatment   Russian 5/5 mhp to gluts, quads    Time-based treatments/modalities:    Physical Therapy Timed Treatment Charges  Manual therapy minutes (CPT 55570): 20 minutes  Therapeutic exercise minutes (CPT 76256): 20 minutes      Pain rating (1-10) before treatment:  1  Pain rating (1-10) after treatment:  1 about the same, not worse with walking    ASSESSMENT:   Noted decreased PPT L gluts with pain with resisted abd.  Patient cont. To present with glut and quad strain/ overuse injury    PLAN/RECOMMENDATIONS:   Progress isometric strength as tolerated, assess gluts DN??

## 2024-06-11 ENCOUNTER — PHYSICAL THERAPY (OUTPATIENT)
Dept: PHYSICAL THERAPY | Facility: REHABILITATION | Age: 59
End: 2024-06-11
Attending: SURGERY
Payer: COMMERCIAL

## 2024-06-11 DIAGNOSIS — R26.2 DIFFICULTY IN WALKING: ICD-10-CM

## 2024-06-11 PROCEDURE — 97014 ELECTRIC STIMULATION THERAPY: CPT

## 2024-06-12 NOTE — OP THERAPY DAILY TREATMENT
Outpatient Physical Therapy  DAILY TREATMENT     [facility] Physical Therapy [address].  [address]  [place] [address]  Phone:  [phone]  Fax:  [phone]    Date: 06/11/2024    Patient: Melva Sauceda  YOB: 1965  MRN: [id number]     Time Calculation    Start time: 0805  Stop time: 0910 Time Calculation (min): 65 minutes         Chief Complaint: No chief complaint on file.    Visit #: 3    SUBJECTIVE:  Different, less fibular head pain and more patellofemoral pain...., overall less pain    OBJECTIVE:  Slight pain with walking abolished pain with strap taping of VL          Therapeutic Treatments and Modalities:     Therapeutic Treatment and Modalities Summary: Mwms supine into ext with lateral guided tibial force  MWMS L hip with caudal force in supine--unable totolerate quadruped  End range a/p knee mobs with er/it  Bridge marching--hep  To7y soldier #1- hep  Modified seated toy soldier with LAQs--hep    ### all hep focus on isometric to fatigue as tolerate  DN: Patient signed informed written release and verbally agreed with informed consent to procedure of dry needling   skin prep with iChlora prep  R g min/med/max,   Cupping to R VL during glut med DN  -TENS w/ FDN    -No adverse reactions observed post treatment   Russian 10/10 mhp to gluts, quads with isometric abd holds x 15'    Time-based treatments/modalities:    Physical Therapy Timed Treatment Charges  Manual therapy minutes (CPT 57366): 20 minutes  Therapeutic exercise minutes (CPT 03373): 30 minutes      Pain rating (1-10) before treatment:  1  Pain rating (1-10) after treatment:  1 about the same, not worse with walking    ASSESSMENT:     Patient cont. To present with glut and quad strain/ overuse injury with significant lateral hip weakness with modified side planks painful. Significant ttp L g med and VL--unable to maintain level pelvis with bridge maching ex. . Patient reported a singificant decreased fibular  head region pain that was primary pain upon eval with diffuse PF pain.  Improved rom w/o pain    PLAN/RECOMMENDATIONS:   Progressisometric abd and planks as tolerated, DN, IASTM

## 2024-06-13 ENCOUNTER — OFFICE VISIT (OUTPATIENT)
Dept: DERMATOLOGY | Facility: IMAGING CENTER | Age: 59
End: 2024-06-13
Payer: COMMERCIAL

## 2024-06-13 DIAGNOSIS — L29.9 ITCHING: ICD-10-CM

## 2024-06-13 DIAGNOSIS — D49.2 NEOPLASM OF SKIN: ICD-10-CM

## 2024-06-13 PROBLEM — M17.9 OSTEOARTHRITIS OF KNEE: Status: ACTIVE | Noted: 2024-03-28

## 2024-06-13 PROBLEM — S83.209A TEAR OF MENISCUS OF KNEE: Status: ACTIVE | Noted: 2024-03-25

## 2024-06-13 PROCEDURE — 11102 TANGNTL BX SKIN SINGLE LES: CPT | Performed by: DERMATOLOGY

## 2024-06-13 PROCEDURE — 99213 OFFICE O/P EST LOW 20 MIN: CPT | Mod: 25 | Performed by: DERMATOLOGY

## 2024-06-13 NOTE — PROGRESS NOTES
CC: Establish Care and Skin Lesion     Subjective: New patient here for lesion on lip, x 2 mths , has tried aquaphor, chapstick. No itch but is tender.    Also has itchy skin from ears to back of shoulders, states Oncologist suspected it was her Chemotherapy changed chemo, but no help. No present rash. Eval for shingles without rash and found to be negative. Used clobetasol in past, gabapentin with limited/if any relief. Focal itch - down wrist, upper back/neck    ROS: no fevers/chills. ++ itch.  No cough  Relevant PMH:Stage 4 breast cancer X 2+ years, chronic chemo-Her2  Social: NS    PE: Gen:WDWN female in NAD. Skin: focal exam: erosion, mid lower lip - approx 0.5cm indurated edges      A/P:Neoplasm NOS: lip - ro scc  -consent for bx, including R/B/A. Cleaned with EtOH, anesthesia with lidocaine 1% + epinephrine, shave bx, hyfrecator for hemostasis  -vaseline/bandage and wound care reviewed    Itching: origin unknown: could be effect of medications/cancer? Suspect nerve etiology by focal description given:  -trial Sarna with menthol  -f/u PRN      I have reviewed medications relevant to my specialty.

## 2024-06-14 ENCOUNTER — PHYSICAL THERAPY (OUTPATIENT)
Dept: PHYSICAL THERAPY | Facility: REHABILITATION | Age: 59
End: 2024-06-14
Attending: SURGERY
Payer: COMMERCIAL

## 2024-06-14 DIAGNOSIS — R26.2 DIFFICULTY IN WALKING: ICD-10-CM

## 2024-06-14 PROCEDURE — 97140 MANUAL THERAPY 1/> REGIONS: CPT

## 2024-06-14 PROCEDURE — 97014 ELECTRIC STIMULATION THERAPY: CPT

## 2024-06-14 PROCEDURE — 97110 THERAPEUTIC EXERCISES: CPT

## 2024-06-14 NOTE — OP THERAPY DAILY TREATMENT
"  Outpatient Physical Therapy  DAILY TREATMENT     [facility] Physical Therapy [address].  [address]  [place] [address]  Phone:  [phone]  Fax:  [phone]    Date: 06/14/2024    Patient: Melva Sauceda  YOB: 1965  MRN: [id number]     Time Calculation    Start time: 0245  Stop time: 0345 Time Calculation (min): 60 minutes         Chief Complaint: No chief complaint on file.    Visit #: 4    SUBJECTIVE:  Progressively worse past 2 days--started with compression sleeve( put   OBJECTIVE:            Therapeutic Treatments and Modalities:     Therapeutic Treatment and Modalities Summary: Medial glide, medial rotaotion with medial tilt patellar tape// abolished patients pain with ambulation   Insturcted medial self taping  Supine marching x  x 30\" x 2  Standing wall abd hep       Peruvian 10/10  vmo and VL alternating with LAQs isometrics x 10' then 5' mhp    Time-based treatments/modalities:    Physical Therapy Timed Treatment Charges  Manual therapy minutes (CPT 23922): 15 minutes  Therapeutic exercise minutes (CPT 64658): 20 minutes      Pain rating (1-10) before treatment:  1-2--worse with any movement  Pain rating (1-10) after treatment:  1 about the same, not worse with walking    ASSESSMENT:   Abolished pain with medial patellar taping and bale to tolerate strengthening w/o pain--noted weak g medius and quads    PLAN/RECOMMENDATIONS:   Progressisometric abd and planks as tolerated, DN, IASTM           "

## 2024-06-19 ENCOUNTER — APPOINTMENT (OUTPATIENT)
Dept: PHYSICAL THERAPY | Facility: REHABILITATION | Age: 59
End: 2024-06-19
Attending: SURGERY
Payer: COMMERCIAL

## 2024-06-21 ENCOUNTER — PATIENT MESSAGE (OUTPATIENT)
Dept: DERMATOLOGY | Facility: IMAGING CENTER | Age: 59
End: 2024-06-21
Payer: COMMERCIAL

## 2024-06-21 RX ORDER — AMOXICILLIN 500 MG/1
500 CAPSULE ORAL 3 TIMES DAILY
Qty: 30 CAPSULE | Refills: 0 | Status: SHIPPED | OUTPATIENT
Start: 2024-06-21

## 2024-06-21 RX ORDER — AMOXICILLIN 500 MG/1
500 CAPSULE ORAL 3 TIMES DAILY
Qty: 30 CAPSULE | Refills: 0 | Status: SHIPPED | OUTPATIENT
Start: 2024-06-21 | End: 2024-06-21 | Stop reason: SDUPTHER

## 2024-06-21 NOTE — PROGRESS NOTES
Rx for amoxicillin sent to pharmacy for patient.  Advised [facility] for care out of state during travels.

## 2024-06-26 ENCOUNTER — OFFICE VISIT (OUTPATIENT)
Dept: DERMATOLOGY | Facility: IMAGING CENTER | Age: 59
End: 2024-06-26
Payer: COMMERCIAL

## 2024-06-26 DIAGNOSIS — Z51.89 VISIT FOR WOUND CHECK: ICD-10-CM

## 2024-06-26 NOTE — PROGRESS NOTES
DERMATOLOGY NOTE  FOLLOW UP VISIT       Chief complaint: Follow-Up   Pt here today following up on bx done on lip on 06/13/2024 by Dr. Polanco. Pt called when out of state that she felt like her lip was getting infected. Dr. Polanco sent an antibiotic to a local [facility] and pt transferred it to the state she was in at the moment of call. Pt today is on day 5 of abx out of 10 days, does report improvement      No Known Allergies     MEDICATIONS:  Medications relevant to specialty reviewed.     REVIEW OF SYSTEMS:   Positive for skin (see HPI)  Negative for fevers and chills       EXAM:  There were no vitals taken for this visit.  Constitutional: Well-developed, well-nourished, and in no distress.     A focused skin exam was performed including the affected areas of the lower lip/vermilion border. Notable findings on exam today listed below and/or in assessment/plan.     Evidence of healing shave bx site with granular tissue present, no evidence of surrounding erythema, bleeding or purulent drainage    IMPRESSION / PLAN:    1. Visit for wound check  Pt here to have bx site evaluated  Was Rx'd ABOs about 5 days ago, states site looks much better  Advised to continue with ABOs until completed  Follow up for no continued improvement or worsening of S/Sx's       Patient verbalized understanding and agrees with plan regarding the above          Please note that this dictation was created using voice recognition software. I have made every reasonable attempt to correct obvious errors, but I expect that there are errors of grammar and possibly content that I did not discover before finalizing the note.      Return to clinic in: Return for PRN. and as needed for any new or changing skin lesions.

## 2024-06-28 ENCOUNTER — PHYSICAL THERAPY (OUTPATIENT)
Dept: PHYSICAL THERAPY | Facility: REHABILITATION | Age: 59
End: 2024-06-28
Attending: SURGERY
Payer: COMMERCIAL

## 2024-06-28 DIAGNOSIS — R26.2 DIFFICULTY IN WALKING: ICD-10-CM

## 2024-06-28 PROCEDURE — 97140 MANUAL THERAPY 1/> REGIONS: CPT

## 2024-06-28 PROCEDURE — 97014 ELECTRIC STIMULATION THERAPY: CPT

## 2024-06-28 NOTE — OP THERAPY DAILY TREATMENT
Outpatient Physical Therapy  DAILY TREATMENT     [facility] Physical Therapy [address]  [address]  [place] [address]  Phone:  [phone]  Fax:  [phone]    Date: 06/28/2024    Patient: Melva Sauceda  YOB: 1965  MRN: [id number]     Time Calculation    Start time: 0800  Stop time: 0900 Time Calculation (min): 60 minutes         Chief Complaint: No chief complaint on file.    Visit #: 5    SUBJECTIVE:  Ran 1/2 marathon and did pretty well--felt a pop in the fibular head region with good relief for a few days  OBJECTIVE:  Asis level  Ttp L ant tib >R noted hypermobile L prox fib head            Therapeutic Treatments and Modalities:     Therapeutic Treatment and Modalities Summary: A/p mobs prox fib head gd 2-4  IASTM ant tib  Cupping and stripping  L ant tib and fibularis with ankle pumps    Djiboutian 10/10  bilateral ant tib 5/5 mhp x 15  Stability tape for anterior translation L fib head  Reviewed HEP and focu son prox stab    Time-based treatments/modalities:    Physical Therapy Timed Treatment Charges  Manual therapy minutes (CPT 16184): 40 minutes      Pain rating (1-10) before treatment:  1-2--worse with any movement  Pain rating (1-10) after treatment:  no pain    ASSESSMENT:   Noted hypermobile L prox fib joint and trg pt tenderness to L ant tibialis     PLAN/RECOMMENDATIONS:   Assess asis, assess fib jt and ant tib Progressive isometric abd and planks as tolerated, DN, IASTM--sls eccentrics

## 2024-07-01 ENCOUNTER — APPOINTMENT (OUTPATIENT)
Dept: MEDICAL GROUP | Facility: MEDICAL CENTER | Age: 59
End: 2024-07-01
Payer: COMMERCIAL

## 2024-07-01 VITALS
WEIGHT: 168.4 LBS | HEART RATE: 60 BPM | RESPIRATION RATE: 16 BRPM | SYSTOLIC BLOOD PRESSURE: 114 MMHG | DIASTOLIC BLOOD PRESSURE: 60 MMHG | TEMPERATURE: 98.4 F | HEIGHT: 68 IN | BODY MASS INDEX: 25.52 KG/M2

## 2024-07-01 DIAGNOSIS — E55.9 VITAMIN D DEFICIENCY: ICD-10-CM

## 2024-07-01 DIAGNOSIS — Z12.12 SCREENING FOR COLORECTAL CANCER: ICD-10-CM

## 2024-07-01 DIAGNOSIS — Z17.0 CARCINOMA OF OVERLAPPING SITES OF LEFT BREAST IN FEMALE, ESTROGEN RECEPTOR POSITIVE (HCC): ICD-10-CM

## 2024-07-01 DIAGNOSIS — Z12.11 SCREENING FOR COLORECTAL CANCER: ICD-10-CM

## 2024-07-01 DIAGNOSIS — C50.812 CARCINOMA OF OVERLAPPING SITES OF LEFT BREAST IN FEMALE, ESTROGEN RECEPTOR POSITIVE (HCC): ICD-10-CM

## 2024-07-01 DIAGNOSIS — E78.5 DYSLIPIDEMIA: ICD-10-CM

## 2024-07-01 DIAGNOSIS — L29.9 PRURITUS: ICD-10-CM

## 2024-07-01 PROBLEM — T45.1X5A CHEMOTHERAPY INDUCED CARDIOMYOPATHY (HCC): Status: RESOLVED | Noted: 2023-10-25 | Resolved: 2024-07-01

## 2024-07-01 PROBLEM — S83.209A TEAR OF MENISCUS OF KNEE: Status: RESOLVED | Noted: 2024-03-25 | Resolved: 2024-07-01

## 2024-07-01 PROBLEM — C50.912 PRIMARY MALIGNANT NEOPLASM OF LEFT FEMALE BREAST (HCC): Status: RESOLVED | Noted: 2021-11-16 | Resolved: 2024-07-01

## 2024-07-01 PROBLEM — R93.1 AGATSTON CORONARY ARTERY CALCIUM SCORE LESS THAN 100: Status: ACTIVE | Noted: 2024-07-01

## 2024-07-01 PROBLEM — M54.50 LOWER BACK PAIN: Status: RESOLVED | Noted: 2024-05-23 | Resolved: 2024-07-01

## 2024-07-01 PROBLEM — I42.7 CHEMOTHERAPY INDUCED CARDIOMYOPATHY (HCC): Status: RESOLVED | Noted: 2023-10-25 | Resolved: 2024-07-01

## 2024-07-01 PROCEDURE — 99214 OFFICE O/P EST MOD 30 MIN: CPT | Performed by: PHYSICIAN ASSISTANT

## 2024-07-01 PROCEDURE — 3078F DIAST BP <80 MM HG: CPT | Performed by: PHYSICIAN ASSISTANT

## 2024-07-01 PROCEDURE — 3074F SYST BP LT 130 MM HG: CPT | Performed by: PHYSICIAN ASSISTANT

## 2024-07-01 ASSESSMENT — FIBROSIS 4 INDEX: FIB4 SCORE: 0.91

## 2024-07-03 ENCOUNTER — PHYSICAL THERAPY (OUTPATIENT)
Dept: PHYSICAL THERAPY | Facility: REHABILITATION | Age: 59
End: 2024-07-03
Attending: SURGERY
Payer: COMMERCIAL

## 2024-07-03 DIAGNOSIS — R26.2 DIFFICULTY IN WALKING: ICD-10-CM

## 2024-07-03 PROCEDURE — 97140 MANUAL THERAPY 1/> REGIONS: CPT

## 2024-07-03 PROCEDURE — 97110 THERAPEUTIC EXERCISES: CPT

## 2024-07-08 ENCOUNTER — PHYSICAL THERAPY (OUTPATIENT)
Dept: PHYSICAL THERAPY | Facility: REHABILITATION | Age: 59
End: 2024-07-08
Attending: SURGERY
Payer: COMMERCIAL

## 2024-07-08 DIAGNOSIS — R26.2 DIFFICULTY IN WALKING: ICD-10-CM

## 2024-07-08 PROCEDURE — 97140 MANUAL THERAPY 1/> REGIONS: CPT

## 2024-07-08 PROCEDURE — 97110 THERAPEUTIC EXERCISES: CPT

## 2024-07-09 ENCOUNTER — HOSPITAL ENCOUNTER (OUTPATIENT)
Facility: MEDICAL CENTER | Age: 59
End: 2024-07-09
Attending: PHYSICIAN ASSISTANT
Payer: COMMERCIAL

## 2024-07-09 DIAGNOSIS — E55.9 VITAMIN D DEFICIENCY: ICD-10-CM

## 2024-07-09 LAB — 25(OH)D3 SERPL-MCNC: 101 NG/ML (ref 30–100)

## 2024-07-09 PROCEDURE — 82306 VITAMIN D 25 HYDROXY: CPT

## 2024-07-19 ENCOUNTER — PHYSICAL THERAPY (OUTPATIENT)
Dept: PHYSICAL THERAPY | Facility: REHABILITATION | Age: 59
End: 2024-07-19
Attending: SURGERY
Payer: COMMERCIAL

## 2024-07-19 DIAGNOSIS — R26.2 DIFFICULTY IN WALKING: ICD-10-CM

## 2024-07-19 PROCEDURE — 97110 THERAPEUTIC EXERCISES: CPT

## 2024-07-24 ENCOUNTER — HOSPITAL ENCOUNTER (OUTPATIENT)
Dept: CARDIOLOGY | Facility: MEDICAL CENTER | Age: 59
End: 2024-07-24
Attending: INTERNAL MEDICINE
Payer: COMMERCIAL

## 2024-07-24 DIAGNOSIS — Q21.12 PFO (PATENT FORAMEN OVALE): ICD-10-CM

## 2024-07-24 DIAGNOSIS — I34.0 MILD MITRAL REGURGITATION: ICD-10-CM

## 2024-07-24 DIAGNOSIS — C50.512 MALIGNANT NEOPLASM OF LOWER-OUTER QUADRANT OF LEFT FEMALE BREAST, UNSPECIFIED ESTROGEN RECEPTOR STATUS (HCC): ICD-10-CM

## 2024-07-24 PROCEDURE — 93306 TTE W/DOPPLER COMPLETE: CPT

## 2024-07-26 ENCOUNTER — PHYSICAL THERAPY (OUTPATIENT)
Dept: PHYSICAL THERAPY | Facility: REHABILITATION | Age: 59
End: 2024-07-26
Attending: SURGERY
Payer: COMMERCIAL

## 2024-07-26 DIAGNOSIS — R26.2 DIFFICULTY IN WALKING: ICD-10-CM

## 2024-07-26 LAB
LV EJECT FRACT  99904: 69
LV EJECT FRACT MOD 2C 99903: 68.39
LV EJECT FRACT MOD 4C 99902: 69.69
LV EJECT FRACT MOD BP 99901: 69.78

## 2024-07-26 PROCEDURE — 97110 THERAPEUTIC EXERCISES: CPT

## 2024-07-26 PROCEDURE — 93306 TTE W/DOPPLER COMPLETE: CPT | Mod: 26 | Performed by: INTERNAL MEDICINE

## 2024-07-26 PROCEDURE — 97140 MANUAL THERAPY 1/> REGIONS: CPT

## 2024-07-31 ENCOUNTER — APPOINTMENT (OUTPATIENT)
Dept: PHYSICAL THERAPY | Facility: REHABILITATION | Age: 59
End: 2024-07-31
Attending: SURGERY
Payer: COMMERCIAL

## 2024-08-02 ENCOUNTER — APPOINTMENT (OUTPATIENT)
Dept: PHYSICAL THERAPY | Facility: REHABILITATION | Age: 59
End: 2024-08-02
Payer: COMMERCIAL

## 2024-08-12 DIAGNOSIS — R19.5 POSITIVE COLORECTAL CANCER SCREENING USING COLOGUARD TEST: ICD-10-CM

## 2024-08-15 ENCOUNTER — APPOINTMENT (OUTPATIENT)
Dept: PHYSICAL THERAPY | Facility: REHABILITATION | Age: 59
End: 2024-08-15
Attending: INTERNAL MEDICINE
Payer: COMMERCIAL

## 2024-08-20 ENCOUNTER — HOSPITAL ENCOUNTER (OUTPATIENT)
Facility: MEDICAL CENTER | Age: 59
End: 2024-08-20
Attending: NURSE PRACTITIONER
Payer: COMMERCIAL

## 2024-08-20 PROCEDURE — 82671 ASSAY OF ESTROGENS: CPT

## 2024-08-27 ENCOUNTER — APPOINTMENT (OUTPATIENT)
Dept: PHYSICAL THERAPY | Facility: REHABILITATION | Age: 59
End: 2024-08-27
Attending: INTERNAL MEDICINE
Payer: COMMERCIAL

## 2024-08-29 ENCOUNTER — APPOINTMENT (OUTPATIENT)
Dept: PHYSICAL THERAPY | Facility: REHABILITATION | Age: 59
End: 2024-08-29
Attending: INTERNAL MEDICINE
Payer: COMMERCIAL

## 2024-08-30 LAB
ESTRADIOL SERPL HS-MCNC: <2 PG/ML
ESTROGEN SERPL CALC-MCNC: NORMAL PG/ML
ESTRONE SERPL-MCNC: <2 PG/ML

## 2024-09-03 ENCOUNTER — APPOINTMENT (OUTPATIENT)
Dept: PHYSICAL THERAPY | Facility: REHABILITATION | Age: 59
End: 2024-09-03
Attending: INTERNAL MEDICINE
Payer: COMMERCIAL

## 2024-09-05 ENCOUNTER — APPOINTMENT (OUTPATIENT)
Dept: PHYSICAL THERAPY | Facility: REHABILITATION | Age: 59
End: 2024-09-05
Attending: INTERNAL MEDICINE
Payer: COMMERCIAL

## 2024-09-10 ENCOUNTER — APPOINTMENT (OUTPATIENT)
Dept: PHYSICAL THERAPY | Facility: REHABILITATION | Age: 59
End: 2024-09-10
Attending: INTERNAL MEDICINE
Payer: COMMERCIAL

## 2024-09-12 ENCOUNTER — APPOINTMENT (OUTPATIENT)
Dept: PHYSICAL THERAPY | Facility: REHABILITATION | Age: 59
End: 2024-09-12
Attending: INTERNAL MEDICINE
Payer: COMMERCIAL

## 2024-09-16 NOTE — TELEPHONE ENCOUNTER
Caller: Melva Sauceda      Topic/issue: Patient was calling about an order for a CT of the Coronary artery and she stated that there was a second form that has to be submitted with it that wasn't submitted and she was asking for a call back    Callback Number: [phone]      Thank you    -Mark KNOX    
Phone Number Called: [phone]    Call outcome: Spoke to patient regarding message below.    Message: Informed patient that there is another form to be filled out and that this RN would complete it for signature from  once she is in office. All questions answered at this time. Advised to call back with any further questions or concerns.     
no

## 2024-09-17 ENCOUNTER — HOSPITAL ENCOUNTER (OUTPATIENT)
Facility: MEDICAL CENTER | Age: 59
End: 2024-09-17
Attending: PHYSICIAN ASSISTANT
Payer: COMMERCIAL

## 2024-09-17 ENCOUNTER — OFFICE VISIT (OUTPATIENT)
Dept: MEDICAL GROUP | Facility: MEDICAL CENTER | Age: 59
End: 2024-09-17
Payer: COMMERCIAL

## 2024-09-17 VITALS — HEIGHT: 68 IN | BODY MASS INDEX: 26.52 KG/M2 | WEIGHT: 175 LBS

## 2024-09-17 DIAGNOSIS — R19.5 POSITIVE COLORECTAL CANCER SCREENING USING COLOGUARD TEST: ICD-10-CM

## 2024-09-17 DIAGNOSIS — C50.812 CARCINOMA OF OVERLAPPING SITES OF LEFT BREAST IN FEMALE, ESTROGEN RECEPTOR POSITIVE (HCC): ICD-10-CM

## 2024-09-17 DIAGNOSIS — Z01.419 WOMEN'S ANNUAL ROUTINE GYNECOLOGICAL EXAMINATION: ICD-10-CM

## 2024-09-17 DIAGNOSIS — Z17.0 CARCINOMA OF OVERLAPPING SITES OF LEFT BREAST IN FEMALE, ESTROGEN RECEPTOR POSITIVE (HCC): ICD-10-CM

## 2024-09-17 PROCEDURE — 99396 PREV VISIT EST AGE 40-64: CPT | Performed by: PHYSICIAN ASSISTANT

## 2024-09-17 PROCEDURE — 87624 HPV HI-RISK TYP POOLED RSLT: CPT

## 2024-09-17 PROCEDURE — 88175 CYTOPATH C/V AUTO FLUID REDO: CPT

## 2024-09-17 PROCEDURE — 99000 SPECIMEN HANDLING OFFICE-LAB: CPT | Performed by: PHYSICIAN ASSISTANT

## 2024-09-17 SDOH — ECONOMIC STABILITY: INCOME INSECURITY: IN THE LAST 12 MONTHS, WAS THERE A TIME WHEN YOU WERE NOT ABLE TO PAY THE MORTGAGE OR RENT ON TIME?: NO

## 2024-09-17 SDOH — HEALTH STABILITY: MENTAL HEALTH
STRESS IS WHEN SOMEONE FEELS TENSE, NERVOUS, ANXIOUS, OR CAN'T SLEEP AT NIGHT BECAUSE THEIR MIND IS TROUBLED. HOW STRESSED ARE YOU?: NOT AT ALL

## 2024-09-17 SDOH — ECONOMIC STABILITY: TRANSPORTATION INSECURITY
IN THE PAST 12 MONTHS, HAS LACK OF TRANSPORTATION KEPT YOU FROM MEETINGS, WORK, OR FROM GETTING THINGS NEEDED FOR DAILY LIVING?: NO

## 2024-09-17 SDOH — HEALTH STABILITY: PHYSICAL HEALTH: ON AVERAGE, HOW MANY MINUTES DO YOU ENGAGE IN EXERCISE AT THIS LEVEL?: 60 MIN

## 2024-09-17 SDOH — ECONOMIC STABILITY: HOUSING INSECURITY
IN THE LAST 12 MONTHS, WAS THERE A TIME WHEN YOU DID NOT HAVE A STEADY PLACE TO SLEEP OR SLEPT IN A SHELTER (INCLUDING NOW)?: NO

## 2024-09-17 SDOH — ECONOMIC STABILITY: FOOD INSECURITY: WITHIN THE PAST 12 MONTHS, YOU WORRIED THAT YOUR FOOD WOULD RUN OUT BEFORE YOU GOT MONEY TO BUY MORE.: NEVER TRUE

## 2024-09-17 SDOH — HEALTH STABILITY: PHYSICAL HEALTH: ON AVERAGE, HOW MANY DAYS PER WEEK DO YOU ENGAGE IN MODERATE TO STRENUOUS EXERCISE (LIKE A BRISK WALK)?: 5 DAYS

## 2024-09-17 SDOH — ECONOMIC STABILITY: INCOME INSECURITY: HOW HARD IS IT FOR YOU TO PAY FOR THE VERY BASICS LIKE FOOD, HOUSING, MEDICAL CARE, AND HEATING?: NOT VERY HARD

## 2024-09-17 SDOH — ECONOMIC STABILITY: FOOD INSECURITY: WITHIN THE PAST 12 MONTHS, THE FOOD YOU BOUGHT JUST DIDN'T LAST AND YOU DIDN'T HAVE MONEY TO GET MORE.: NEVER TRUE

## 2024-09-17 ASSESSMENT — LIFESTYLE VARIABLES
HOW MANY STANDARD DRINKS CONTAINING ALCOHOL DO YOU HAVE ON A TYPICAL DAY: 1 OR 2
SKIP TO QUESTIONS 9-10: 1
AUDIT-C TOTAL SCORE: 4
HOW OFTEN DO YOU HAVE SIX OR MORE DRINKS ON ONE OCCASION: NEVER
HOW OFTEN DO YOU HAVE A DRINK CONTAINING ALCOHOL: 4 OR MORE TIMES A WEEK

## 2024-09-17 ASSESSMENT — SOCIAL DETERMINANTS OF HEALTH (SDOH)
IN THE PAST 12 MONTHS, HAS THE ELECTRIC, GAS, OIL, OR WATER COMPANY THREATENED TO SHUT OFF SERVICE IN YOUR HOME?: NO
HOW OFTEN DO YOU ATTENT MEETINGS OF THE CLUB OR ORGANIZATION YOU BELONG TO?: 1 TO 4 TIMES PER YEAR
HOW OFTEN DO YOU GET TOGETHER WITH FRIENDS OR RELATIVES?: ONCE A WEEK
HOW OFTEN DO YOU ATTEND CHURCH OR RELIGIOUS SERVICES?: 1 TO 4 TIMES PER YEAR
HOW HARD IS IT FOR YOU TO PAY FOR THE VERY BASICS LIKE FOOD, HOUSING, MEDICAL CARE, AND HEATING?: NOT VERY HARD
HOW OFTEN DO YOU HAVE A DRINK CONTAINING ALCOHOL: 4 OR MORE TIMES A WEEK
HOW OFTEN DO YOU ATTENT MEETINGS OF THE CLUB OR ORGANIZATION YOU BELONG TO?: 1 TO 4 TIMES PER YEAR
DO YOU BELONG TO ANY CLUBS OR ORGANIZATIONS SUCH AS CHURCH GROUPS UNIONS, FRATERNAL OR ATHLETIC GROUPS, OR SCHOOL GROUPS?: NO
HOW OFTEN DO YOU HAVE SIX OR MORE DRINKS ON ONE OCCASION: NEVER
IN A TYPICAL WEEK, HOW MANY TIMES DO YOU TALK ON THE PHONE WITH FAMILY, FRIENDS, OR NEIGHBORS?: MORE THAN THREE TIMES A WEEK
HOW OFTEN DO YOU ATTEND CHURCH OR RELIGIOUS SERVICES?: 1 TO 4 TIMES PER YEAR
HOW MANY DRINKS CONTAINING ALCOHOL DO YOU HAVE ON A TYPICAL DAY WHEN YOU ARE DRINKING: 1 OR 2
DO YOU BELONG TO ANY CLUBS OR ORGANIZATIONS SUCH AS CHURCH GROUPS UNIONS, FRATERNAL OR ATHLETIC GROUPS, OR SCHOOL GROUPS?: NO
IN A TYPICAL WEEK, HOW MANY TIMES DO YOU TALK ON THE PHONE WITH FAMILY, FRIENDS, OR NEIGHBORS?: MORE THAN THREE TIMES A WEEK
HOW OFTEN DO YOU GET TOGETHER WITH FRIENDS OR RELATIVES?: ONCE A WEEK
WITHIN THE PAST 12 MONTHS, YOU WORRIED THAT YOUR FOOD WOULD RUN OUT BEFORE YOU GOT THE MONEY TO BUY MORE: NEVER TRUE

## 2024-09-17 ASSESSMENT — FIBROSIS 4 INDEX: FIB4 SCORE: 0.93

## 2024-09-17 NOTE — PROGRESS NOTES
Subjective:   Melva Sauceda is a 59 y.o. female here today for pap    Pleasant 59-year-old female comes in for routine annual gynecological exam with Pap smear.  Last Pap has been approximately over 5 years ago.  It was abnormal but she is not sure what the abnormality was and did not have follow-up therefore she is here for Pap in the interim she was diagnosed with stage IV breast cancer and had left breast partial mastectomy with axillary lymph node dissection.  She is on lifetime Perjeta and Herceptin she follows with Dr. Loza oncology and has also followed with breast surgeon.  Her mom has history of breast cancer but no other known history of rib or reproductive cancers with herself or her family including ovarian or uterine she did have a positive Cologuard and is being set up for colonoscopy.  She is  and has 3 pregnancies with 3 living children first was born vaginal second with  and third was vaginal.  Denies any current vaginal complaints.  Not currently sexually active    Current medicines (including changes today)  Current Outpatient Medications   Medication Sig Dispense Refill    Cholecalciferol (VITAMIN D3) 125 MCG (5000 UT) Cap Take 1 Capsule by mouth every day.      gabapentin (NEURONTIN) 100 MG Cap Take 100 mg by mouth 3 times a day as needed (PAIN).      anastrozole (ARIMIDEX) 1 MG Tab Take 1 tablet by mouth daily 90 Tablet 0    Trastuzumab (HERCEPTIN IV) Infuse 1 Each into a venous catheter every 21 days.      Pertuzumab (PERJETA IV) Infuse 1 Each into a venous catheter every 21 days.      clobetasol (TEMOVATE) 0.05 % Cream Apply twice a day to itchy areas on right arm, right neck and left shoulder. Once better stop. Repeat as needed 30 g 0     No current facility-administered medications for this visit.     She  has a past medical history of Cancer (HCC) (2021), Cataract, COVID-19 (2022), Detached retina, right, PONV (postoperative nausea and vomiting), and  "Prediabetes.  Patient has no known allergies.     Social History and Family History were reviewed and updated.    ROS   No headaches, chest pain, no shortness of breath, abdominal pain, nausea, or vomiting.  All other systems were reviewed and are negative or noted as positive in the HPI.       Objective:     Ht 1.727 m (5' 8\")   Wt 79.4 kg (175 lb)  Body mass index is 26.61 kg/m².     Physical Exam:  Constitutional: ANO x3, no acute distress, well-nourished, well-hydrated   Skin: Warm, dry, good turgor, no rashes in visible areas.    Breast examination: Normal breasts bilaterally.  There is no asymmetry, there are no palpable masses, redness or swelling noted.  There is no nipple inversion.  There is no axillary lymphadenopathy palpable.    Genitalia: Normal external genitalia without masses or lesions noted.  Normal vaginal vault.  There is no abnormal vaginal discharge noted.  Cervix is multiparous.  Cervix is nonfriable.  On manual examination.  There is no cervical motion tenderness noted.  Uterus and adnexa are nonpalpable.    Clinical Course/Lab Analysis:        Assessment and Plan:   The following treatment plan was discussed.  Signs and symptoms for which to return were discussed with patient at length.  Patient verbalized understanding.    1. Women's annual routine gynecological examination  - THINPREP PAP WITH HPV; Future    2. Carcinoma of overlapping sites of left breast in female, estrogen receptor positive (HCC)  Continue Perjeta and Herceptin and care with oncology.  Today's breast exam is normal.    3. Positive colorectal cancer screening using Cologuard test  Patient will be getting a colonoscopy soon secondary to positive Cologuard testing       Followup: With PCP as directed    Please note that this dictation was created using voice recognition software. I have made every reasonable attempt to correct obvious errors, but I expect that there are errors of grammar and possibly content that I did " not discover before finalizing the note.

## 2024-09-22 LAB
CYTOLOGIST CVX/VAG CYTO: NORMAL
CYTOLOGY CVX/VAG DOC CYTO: NORMAL
CYTOLOGY CVX/VAG DOC THIN PREP: NORMAL
HPV I/H RISK 4 DNA CVX QL PROBE+SIG AMP: NEGATIVE
NOTE NL11727A: NORMAL
OTHER STN SPEC: NORMAL
STAT OF ADQ CVX/VAG CYTO-IMP: NORMAL

## 2024-10-22 ENCOUNTER — HOSPITAL ENCOUNTER (OUTPATIENT)
Facility: MEDICAL CENTER | Age: 59
End: 2024-10-22
Attending: NURSE PRACTITIONER
Payer: COMMERCIAL

## 2024-10-22 PROCEDURE — 82671 ASSAY OF ESTROGENS: CPT

## 2024-10-24 ENCOUNTER — OFFICE VISIT (OUTPATIENT)
Dept: DERMATOLOGY | Facility: IMAGING CENTER | Age: 59
End: 2024-10-24
Payer: COMMERCIAL

## 2024-10-24 DIAGNOSIS — L29.9 ITCHING: ICD-10-CM

## 2024-10-24 PROCEDURE — 99214 OFFICE O/P EST MOD 30 MIN: CPT | Performed by: DERMATOLOGY

## 2024-10-24 RX ORDER — PREDNISONE 10 MG/1
TABLET ORAL
Qty: 32 TABLET | Refills: 0 | Status: SHIPPED | OUTPATIENT
Start: 2024-10-24

## 2024-10-26 LAB
ESTRADIOL SERPL HS-MCNC: 3.7 PG/ML
ESTROGEN SERPL CALC-MCNC: 18.4 PG/ML
ESTRONE SERPL-MCNC: 14.7 PG/ML

## 2024-10-29 ENCOUNTER — TELEPHONE (OUTPATIENT)
Dept: DERMATOLOGY | Facility: IMAGING CENTER | Age: 59
End: 2024-10-29
Payer: COMMERCIAL

## 2024-10-30 PROCEDURE — RXMED WILLOW AMBULATORY MEDICATION CHARGE

## 2024-10-31 ENCOUNTER — HOSPITAL ENCOUNTER (OUTPATIENT)
Dept: RADIOLOGY | Facility: MEDICAL CENTER | Age: 59
End: 2024-10-31
Payer: COMMERCIAL

## 2024-11-06 ENCOUNTER — TELEPHONE (OUTPATIENT)
Dept: CARDIOLOGY | Facility: MEDICAL CENTER | Age: 59
End: 2024-11-06
Payer: COMMERCIAL

## 2024-11-06 NOTE — TELEPHONE ENCOUNTER
Last OV: 01/24/2024  Proposed Surgery: Colonoscopy  Surgery Date: TBD  Requesting Office Name: [facility]   Fax Number: [phone]  Preference of Location (default is surgery center unless specified by Cardiologist or PO)  Prior Clearance Addressed: No    Is this a general clearance? YES   Anticoags/Antiplatelets: Other none   Anticoags/Antiplatelet managed by Cardiology? NA   Outstanding Cardiac Imaging : No  Ablation, Cardioversion, Stent, Cardiac Devices, Catheterization, Watchman: No  TAVR/Valve, Mitral Clip, Watchman (including open heart),: N/A   Recent Cardiac Hospitalization: No            When: N/A  History (cardiac history):   Past Medical History:   Diagnosis Date    Cancer (HCC) 12/2021    Breast cancer - left    Cataract     H/O OU    COVID-19 06/11/2022    Detached retina, right     H/O    PONV (postoperative nausea and vomiting)     Prediabetes            Is this a dental clearance? NO  Ablation, Cardioversion, Watchman, Stents, Cath, Devices within the last 3 months? No   If yes- Send dental wait letter, do not forward to provider for review.     TAVR / Valve, Mitral clip within the last 6 months? No  If yes- Send dental wait letter, do not forward to provider for review.     If completing a general clearance, continue per protocol.           Surgical Clearance Letter Sent: YES   **Scan clearance request letter into [facility].**

## 2024-11-06 NOTE — LETTER
PROCEDURE/SURGERY CLEARANCE FORM      Encounter Date: 11/6/2024    Patient: Melva Sauceda  YOB: 1965    CARDIOLOGIST:  Missael Lee M.D.    REFERRING DOCTOR:  No ref. provider found    The following procedure/surgery: Colonoscopy                                            PROCEDURE/SURGERY CLEARANCE FORM    Date: 11/6/2024   Patient Name: Melva Sauceda    Dear Surgeon or Proceduralist,      Thank you for your request for cardiac stratification of our mutual patient Melva Sauceda 1965. We have reviewed their [facility] records; and to the best of our understanding this patient has not had stenting, ablation, watchman, cardiothoracic surgery or hospitalization for cardiovascular reasons in the past 6 months.  Melva Sauceda has been seen within the past 15 months and is considered to have non-modifiable cardiac risk for this low-risk procedure/surgery. They may proceed from a cardiovascular standpoint and may hold their antiplatelet/anticoagulation as briefly as possible. Please have patient resume this medication when hemodynamically stable to do so.     Aspirin or Prasugrel   - hold 7 days prior to procedure/surgery, resume when hemodynamically stable      Clopidrogrel or Ticagrelor  - hold 7 days for all neurological procedures, hold 5 days prior to all other procedure/surgery,  resume when hemodynamically stable     Warfarin - hold 7 days for all neurological procedures, hold 5 days prior to all other procedure/surgery and coordinate with [facility] ([phone]) INR testing and dose management.      Pradaxa/Xarelto/Eliquis/Savesya - hold 1 day prior to procedure for low bleeding risk procedure, 2 days for high bleeding risk procedure, or consider holding 3 days or longer for patients with reduced kidney function (CrCl <30mL/min) or spinal/cranial surgeries/procedures.      If they have a mechanical heart valve, please coordinate with [facility]  ([phone]) the proper management of their anticoagulant in the periprocedural or perioperative period.      Some patients have higher risk for cardiovascular complications or holding medication. If our patient has had prior complications of holding antiplatelet or anticoagulants in the past and we have seen them after these events, we have addressed these concerns with the patient. They are at an unknown degree of increased risk for recurrent complication.  You may hold anticoagulation/antiplatelets for the procedure or surgery if the benefits of the procedure or surgery outweigh this nonmodifiable risk.      If Melva Sauceda 1965 has new symptoms of heart failure decompensation, unstable arrythmia, or angina please reach out and we will assess the patient.      If you have other patient-specific concerns, please feel free to reach out to the patient's cardiologist directly at [phone].     Thank you,       [facility]

## 2024-11-07 ENCOUNTER — PHARMACY VISIT (OUTPATIENT)
Dept: PHARMACY | Facility: MEDICAL CENTER | Age: 59
End: 2024-11-07
Payer: COMMERCIAL

## 2024-11-25 ENCOUNTER — OFFICE VISIT (OUTPATIENT)
Dept: URGENT CARE | Facility: CLINIC | Age: 59
End: 2024-11-25
Payer: COMMERCIAL

## 2024-11-25 VITALS
OXYGEN SATURATION: 99 % | DIASTOLIC BLOOD PRESSURE: 86 MMHG | HEIGHT: 68 IN | SYSTOLIC BLOOD PRESSURE: 132 MMHG | BODY MASS INDEX: 26.55 KG/M2 | TEMPERATURE: 98.1 F | WEIGHT: 175.2 LBS | HEART RATE: 64 BPM | RESPIRATION RATE: 14 BRPM

## 2024-11-25 DIAGNOSIS — J02.9 PHARYNGITIS, UNSPECIFIED ETIOLOGY: ICD-10-CM

## 2024-11-25 DIAGNOSIS — J02.0 STREP THROAT: ICD-10-CM

## 2024-11-25 DIAGNOSIS — J06.9 ACUTE URI: ICD-10-CM

## 2024-11-25 LAB
FLUAV RNA SPEC QL NAA+PROBE: NEGATIVE
FLUBV RNA SPEC QL NAA+PROBE: NEGATIVE
RSV RNA SPEC QL NAA+PROBE: NEGATIVE
S PYO DNA SPEC NAA+PROBE: DETECTED
SARS-COV-2 RNA RESP QL NAA+PROBE: NEGATIVE

## 2024-11-25 PROCEDURE — 3075F SYST BP GE 130 - 139MM HG: CPT | Performed by: PHYSICIAN ASSISTANT

## 2024-11-25 PROCEDURE — 87651 STREP A DNA AMP PROBE: CPT | Performed by: PHYSICIAN ASSISTANT

## 2024-11-25 PROCEDURE — 3079F DIAST BP 80-89 MM HG: CPT | Performed by: PHYSICIAN ASSISTANT

## 2024-11-25 PROCEDURE — 99213 OFFICE O/P EST LOW 20 MIN: CPT | Performed by: PHYSICIAN ASSISTANT

## 2024-11-25 PROCEDURE — 0241U POCT CEPHEID COV-2, FLU A/B, RSV - PCR: CPT | Performed by: PHYSICIAN ASSISTANT

## 2024-11-25 RX ORDER — AMOXICILLIN 500 MG/1
500 CAPSULE ORAL 2 TIMES DAILY
Qty: 20 CAPSULE | Refills: 0 | Status: SHIPPED | OUTPATIENT
Start: 2024-11-25 | End: 2024-12-05

## 2024-11-25 ASSESSMENT — ENCOUNTER SYMPTOMS
SINUS PAIN: 1
SHORTNESS OF BREATH: 0
SORE THROAT: 1
FEVER: 0
CHILLS: 0
HEADACHES: 1
VOMITING: 0
RHINORRHEA: 1

## 2024-11-25 ASSESSMENT — FIBROSIS 4 INDEX: FIB4 SCORE: 0.93

## 2024-11-26 NOTE — PROGRESS NOTES
Subjective     Melva Sauceda is a 59 y.o. female who presents with Nasal Congestion (Sore throat x 2 days )      URI   This is a new problem. The current episode started in the past 7 days (started ~ 2 days ago). The problem has been gradually worsening. There has been no fever. Associated symptoms include congestion, headaches, a plugged ear sensation, rhinorrhea, sinus pain and a sore throat. Pertinent negatives include no chest pain or vomiting. Ear pain: left ear fullness/popping.      Review of Systems   Constitutional:  Positive for malaise/fatigue. Negative for chills and fever.   HENT:  Positive for congestion, rhinorrhea, sinus pain and sore throat. Ear pain: left ear fullness/popping.   Respiratory:  Negative for shortness of breath.    Cardiovascular:  Negative for chest pain.   Gastrointestinal:  Negative for vomiting.   Neurological:  Positive for headaches.         PMH:  has a past medical history of Cancer (HCC) (12/2021), Cataract, COVID-19 (06/11/2022), Detached retina, right, PONV (postoperative nausea and vomiting), and Prediabetes.  MEDS:   Current Outpatient Medications:     amoxicillin (AMOXIL) 500 MG Cap, Take 1 Capsule by mouth 2 times a day for 10 days., Disp: 20 Capsule, Rfl: 0    Cholecalciferol (VITAMIN D3) 125 MCG (5000 UT) Cap, Take 1 Capsule by mouth every day., Disp: , Rfl:     anastrozole (ARIMIDEX) 1 MG Tab, Take 1 tablet by mouth daily, Disp: 90 Tablet, Rfl: 0    Trastuzumab (HERCEPTIN IV), Infuse 1 Each into a venous catheter every 21 days., Disp: , Rfl:     Pertuzumab (PERJETA IV), Infuse 1 Each into a venous catheter every 21 days., Disp: , Rfl:     dupilumab (DUPIXENT) 300 MG/2ML injection, Start 600mg Subcut X 1 on day 1 and then 300mg Subcut every other week thereafter (Patient not taking: Reported on 11/25/2024), Disp: 2 Each, Rfl: 6  ALLERGIES: No Known Allergies  SURGHX:   Past Surgical History:   Procedure Laterality Date    PB MASTECTOMY, PARTIAL Left 7/12/2022     "Procedure: LEFT ELIAN  LOCALIZED PARTIAL MASTECTOMY, LEFT SENTINEL NODE BIOPSY, POSSIBLE AXILLARY DISSECTION, BIOABSORBABLE FIDUCIAL MARKER PLACEMENT;  Surgeon: Shane Jorge M.D.;  Location: SURGERY [facility];  Service: General    NODE BIOPSY SENTINEL Left 2022    Procedure: BIOPSY, LYMPH NODE, SENTINEL;  Surgeon: Shane Jorge M.D.;  Location: SURGERY [facility];  Service: General    AXILLARY NODE DISSECTION Left 2022    Procedure: LYMPHADENECTOMY,  AXILLARY;  Surgeon: Shane Jorge M.D.;  Location: SURGERY [facility];  Service: General    IN ULTRASONIC GUIDANCE, INTRAOPERATIVE Right 2021    Procedure: ULTRASOUND GUIDANCE;  Surgeon: Shane Jorge M.D.;  Location: SURGERY [facility];  Service: General    CATH PLACEMENT Right 2021    Procedure: INSERTION, CATHETER - PORT A;  Surgeon: Shane Jorge M.D.;  Location: SURGERY [facility];  Service: General    RETINAL DETACHMENT REPAIR Right     BUNIONECTOMY Right     CATARACT EXTRACTION WITH IOL Left     GYN SURGERY           LUMPECTOMY Bilateral     >20 years    OTHER      Cataract Extraction OD     SOCHX:  reports that she has never smoked. She has never used smokeless tobacco. She reports current alcohol use of about 3.6 - 4.2 oz of alcohol per week. She reports that she does not use drugs.  FH: Family history was reviewed, no pertinent findings to report      Objective     /86   Pulse 64   Temp 36.7 °C (98.1 °F) (Temporal)   Resp 14   Ht 1.727 m (5' 8\")   Wt 79.5 kg (175 lb 3.2 oz)   SpO2 99%   BMI 26.64 kg/m²      Physical Exam  Constitutional:       Appearance: She is well-developed.   HENT:      Head: Normocephalic and atraumatic.      Right Ear: Tympanic membrane, ear canal and external ear normal.      Left Ear: Tympanic membrane, ear canal and external ear normal.      Nose: Mucosal edema and congestion present. No rhinorrhea.      Right Sinus: Maxillary sinus tenderness and frontal sinus " tenderness present.      Left Sinus: Maxillary sinus tenderness and frontal sinus tenderness present.      Mouth/Throat:      Lips: Pink.      Mouth: Mucous membranes are moist.      Pharynx: Uvula midline. Posterior oropharyngeal erythema present. No oropharyngeal exudate.   Eyes:      Conjunctiva/sclera: Conjunctivae normal.      Pupils: Pupils are equal, round, and reactive to light.   Cardiovascular:      Rate and Rhythm: Normal rate and regular rhythm.      Heart sounds: Normal heart sounds. No murmur heard.  Pulmonary:      Effort: Pulmonary effort is normal.      Breath sounds: Normal breath sounds. No wheezing.   Musculoskeletal:      Cervical back: Normal range of motion.   Lymphadenopathy:      Head:      Right side of head: Tonsillar adenopathy present.      Left side of head: Submandibular and tonsillar adenopathy present.      Cervical: Cervical adenopathy present.   Skin:     General: Skin is warm and dry.      Capillary Refill: Capillary refill takes less than 2 seconds.   Neurological:      Mental Status: She is alert and oriented to person, place, and time.   Psychiatric:         Behavior: Behavior normal.         Judgment: Judgment normal.       POCT GROUP A STREP, PCR - POSITIVE    POCT CoV-2, Flu A/B, RSV by PCR - Negative    Assessment & Plan     1. Pharyngitis, unspecified etiology  - POCT GROUP A STREP, PCR  - POCT CoV-2, Flu A/B, RSV by PCR    2. Acute URI  - POCT CoV-2, Flu A/B, RSV by PCR    3. Strep throat  - amoxicillin (AMOXIL) 500 MG Cap; Take 1 Capsule by mouth 2 times a day for 10 days.  Dispense: 20 Capsule; Refill: 0  -Supportive care discussed to include salt water gargles, throat lozenges, and increased fluid intake  - Tylenol or ibuprofen as needed for fever > 100.4 F  Discussed with patient that they are contagious until they been on the antibiotics for at least 24 hours.  Also recommend that they switch their toothbrush out after being on the antibiotics for 2 to 3  days.              Differential Diagnosis, natural history, and supportive care discussed. Return to the Urgent Care or follow up with your PCP if symptoms fail to resolve, or for any new or worsening symptoms. Emergency room precautions discussed. Patient and/or family appears understanding of information.

## 2024-12-03 ENCOUNTER — OFFICE VISIT (OUTPATIENT)
Dept: DERMATOLOGY | Facility: IMAGING CENTER | Age: 59
End: 2024-12-03
Payer: COMMERCIAL

## 2024-12-03 DIAGNOSIS — L57.0 ACTINIC KERATOSIS: ICD-10-CM

## 2024-12-03 DIAGNOSIS — L28.1 PRURIGO NODULARIS: ICD-10-CM

## 2024-12-03 PROCEDURE — 99213 OFFICE O/P EST LOW 20 MIN: CPT | Mod: 25 | Performed by: NURSE PRACTITIONER

## 2024-12-03 PROCEDURE — 17000 DESTRUCT PREMALG LESION: CPT | Performed by: NURSE PRACTITIONER

## 2024-12-03 NOTE — PROGRESS NOTES
DERMATOLOGY NOTE  FOLLOW UP VISIT       Chief complaint: Follow-Up  Pt Following up on her lip also wants to verify that dupitxent gets sent to the right pharmacy.   Bx done in June 2024, showed excoriated AK on central lower lip  Pt did develop infection post bx (tx'd with ABOs), but did not return for recommended additional LN2      No Known Allergies     MEDICATIONS:  Medications relevant to specialty reviewed.     REVIEW OF SYSTEMS:   Positive for skin (see HPI)  Negative for fevers and chills       EXAM:  There were no vitals taken for this visit.  Constitutional: Well-developed, well-nourished, and in no distress.     A focused skin exam was performed including the affected areas of the lower lip/vermilion border. Notable findings on exam today listed below and/or in assessment/plan.     Ill-defined erythematous gritty/scaly papule over the center lower lip/vermilion border      IMPRESSION / PLAN:    1. Actinic keratosis  Here for additional LN2, AK found on shave  bx 6/2024  CRYOTHERAPY:  Risks (including, but not limited to: skin discoloration, redness, blister, blood blister, recurrence, need for further treatment, infection, scar) and benefits of cryotherapy discussed. Patient verbally agreed to proceed with treatment. 1 cryotherapy freeze thaw cycles of 10 seconds were applied to 1 lesion on lower lip with cryac. Patient tolerated procedure well. Aftercare instructions given--no specific care needed unless irritated during healing process, can apply Vaseline with small band-aid if needed.      2. Prurigo nodularis  Was started on Rx below, but needs it called to different pharmacy  Advised follow up with Dr. Polanco as recommended  - dupilumab (DUPIXENT) 300 MG/2ML injection; Start 600mg Subcut X 1 on day 1 and then 300mg Subcut every other week thereafter  Dispense: 2 Each; Refill: 6        Discussed risks, benefits, alternative treatments associated with LN2,   Patient verbalized understanding and agrees  with plan regarding the above          Please note that this dictation was created using voice recognition software. I have made every reasonable attempt to correct obvious errors, but I expect that there are errors of grammar and possibly content that I did not discover before finalizing the note.      Return to clinic in: Return for PRN. and as needed for any new or changing skin lesions.

## 2024-12-05 ENCOUNTER — HOSPITAL ENCOUNTER (OUTPATIENT)
Dept: RADIOLOGY | Facility: MEDICAL CENTER | Age: 59
End: 2024-12-05
Attending: INTERNAL MEDICINE
Payer: COMMERCIAL

## 2024-12-05 DIAGNOSIS — R42 DIZZINESS AND GIDDINESS: ICD-10-CM

## 2024-12-05 DIAGNOSIS — Z51.11 ENCOUNTER FOR ANTINEOPLASTIC CHEMOTHERAPY: ICD-10-CM

## 2024-12-05 DIAGNOSIS — I89.0 LYMPHEDEMA: ICD-10-CM

## 2024-12-05 DIAGNOSIS — Z51.12 ENCOUNTER FOR ANTINEOPLASTIC IMMUNOTHERAPY: ICD-10-CM

## 2024-12-05 DIAGNOSIS — Z79.811 LONG TERM CURRENT USE OF AROMATASE INHIBITOR: ICD-10-CM

## 2024-12-05 DIAGNOSIS — T45.1X5A AGRANULOCYTOSIS SECONDARY TO CANCER CHEMOTHERAPY (HCC): ICD-10-CM

## 2024-12-05 DIAGNOSIS — R93.1 ABNORMAL FINDINGS ON DIAGNOSTIC IMAGING OF HEART/CORONARY CIRCULATION: ICD-10-CM

## 2024-12-05 DIAGNOSIS — C79.51 SECONDARY MALIGNANT NEOPLASM OF BONE AND BONE MARROW (HCC): ICD-10-CM

## 2024-12-05 DIAGNOSIS — Z86.16 PERSONAL HISTORY OF COVID-19: ICD-10-CM

## 2024-12-05 DIAGNOSIS — D70.1 AGRANULOCYTOSIS SECONDARY TO CANCER CHEMOTHERAPY (HCC): ICD-10-CM

## 2024-12-05 DIAGNOSIS — C79.52 SECONDARY MALIGNANT NEOPLASM OF BONE AND BONE MARROW (HCC): ICD-10-CM

## 2024-12-05 DIAGNOSIS — G89.3 NEOPLASM RELATED PAIN (ACUTE) (CHRONIC): ICD-10-CM

## 2024-12-05 DIAGNOSIS — R23.9 SKIN CHANGE: ICD-10-CM

## 2024-12-05 DIAGNOSIS — C50.512: ICD-10-CM

## 2024-12-05 PROCEDURE — 77080 DXA BONE DENSITY AXIAL: CPT

## 2024-12-10 PROCEDURE — RXMED WILLOW AMBULATORY MEDICATION CHARGE: Performed by: INTERNAL MEDICINE

## 2024-12-12 ENCOUNTER — PHARMACY VISIT (OUTPATIENT)
Dept: PHARMACY | Facility: MEDICAL CENTER | Age: 59
End: 2024-12-12
Payer: COMMERCIAL

## 2025-01-08 ENCOUNTER — OFFICE VISIT (OUTPATIENT)
Dept: MEDICAL GROUP | Facility: MEDICAL CENTER | Age: 60
End: 2025-01-08
Payer: COMMERCIAL

## 2025-01-08 VITALS
BODY MASS INDEX: 26.1 KG/M2 | SYSTOLIC BLOOD PRESSURE: 120 MMHG | DIASTOLIC BLOOD PRESSURE: 66 MMHG | TEMPERATURE: 97.4 F | HEART RATE: 70 BPM | HEIGHT: 68 IN | OXYGEN SATURATION: 96 % | WEIGHT: 172.2 LBS

## 2025-01-08 DIAGNOSIS — R93.1 AGATSTON CORONARY ARTERY CALCIUM SCORE LESS THAN 100: ICD-10-CM

## 2025-01-08 DIAGNOSIS — E78.00 PURE HYPERCHOLESTEROLEMIA: ICD-10-CM

## 2025-01-08 DIAGNOSIS — L29.9 PRURITUS: ICD-10-CM

## 2025-01-08 PROBLEM — F43.23 ADJUSTMENT DISORDER WITH MIXED ANXIETY AND DEPRESSED MOOD: Status: RESOLVED | Noted: 2022-02-16 | Resolved: 2025-01-08

## 2025-01-08 PROBLEM — R19.5 POSITIVE COLORECTAL CANCER SCREENING USING COLOGUARD TEST: Status: RESOLVED | Noted: 2024-08-12 | Resolved: 2025-01-08

## 2025-01-08 PROCEDURE — 3074F SYST BP LT 130 MM HG: CPT | Performed by: PHYSICIAN ASSISTANT

## 2025-01-08 PROCEDURE — 3078F DIAST BP <80 MM HG: CPT | Performed by: PHYSICIAN ASSISTANT

## 2025-01-08 PROCEDURE — 99214 OFFICE O/P EST MOD 30 MIN: CPT | Performed by: PHYSICIAN ASSISTANT

## 2025-01-08 ASSESSMENT — PATIENT HEALTH QUESTIONNAIRE - PHQ9: CLINICAL INTERPRETATION OF PHQ2 SCORE: 0

## 2025-01-08 ASSESSMENT — FIBROSIS 4 INDEX: FIB4 SCORE: 0.93

## 2025-01-08 NOTE — LETTER
[employer] [facility]  Uri Dumont P.A.-C.  [address]  [place] [address]  Fax: [phone]   Authorization for Release/Disclosure of   Protected Health Information   Name: MARA ALDRICH : 1965 SSN: [id number]   Address: [address]  [address]  [place] [address] Phone:    [phone] (home)    I authorize the entity listed below to release/disclose the PHI below to:   [facility]/Uri Dumont P.A.-C. and Uri Dumont P.A.-C.   Provider or Entity Name:  [facility]   Address   City, State, Zip   Phone:      Fax:     Reason for request: continuity of care   Information to be released: last note please. And future notes please.   [  ] LAST COLONOSCOPY,  including any PATH REPORT and follow-up  [  ] LAST FIT/COLOGUARD RESULT [  ] LAST DEXA  [  ] LAST MAMMOGRAM  [  ] LAST PAP  [  ] LAST LABS [  ] RETINA EXAM REPORT  [  ] IMMUNIZATION RECORDS  [X  ] Release all info      [  ] Check here and initial the line next to each item to release ALL health information INCLUDING  _____ Care and treatment for drug and / or alcohol abuse  _____ HIV testing, infection status, or AIDS  _____ Genetic Testing    DATES OF SERVICE OR TIME PERIOD TO BE DISCLOSED: _____________  I understand and acknowledge that:  * This Authorization may be revoked at any time by you in writing, except if your health information has already been used or disclosed.  * Your health information that will be used or disclosed as a result of you signing this authorization could be re-disclosed by the recipient. If this occurs, your re-disclosed health information may no longer be protected by State or Federal laws.  * You may refuse to sign this Authorization. Your refusal will not affect your ability to obtain treatment.  * This Authorization becomes effective upon signing and will  on (date) __________.      If no date is indicated, this Authorization will  one (1) year from the signature date.     Name: Melva Sonya Sauceda  Signature: Date:   1/8/2025     PLEASE FAX REQUESTED RECORDS BACK TO: [phone]

## 2025-01-08 NOTE — LETTER
[facility]  Uri Dumont P.A.-C.  [address]  [place] [address]  Fax: [phone]   Authorization for Release/Disclosure of   Protected Health Information   Name: MARA ALDRICH : 1965 SSN: [id number]   Address: [address]  [address]  [place] [address] Phone:    [phone] (home)    I authorize the entity listed below to release/disclose the PHI below to:   [facility]/Uri Dumont P.A.-C. and Uri Dumont P.A.-C.   Provider or Entity Name:  [facility]   Address   City, State, Zip   Phone:      Fax:     Reason for request: continuity of care   Information to be released: Need report please.   [ X ] LAST COLONOSCOPY,  including any PATH REPORT and follow-up  [  ] LAST FIT/COLOGUARD RESULT [  ] LAST DEXA  [  ] LAST MAMMOGRAM  [  ] LAST PAP  [  ] LAST LABS [  ] RETINA EXAM REPORT  [  ] IMMUNIZATION RECORDS  [  ] Release all info      [  ] Check here and initial the line next to each item to release ALL health information INCLUDING  _____ Care and treatment for drug and / or alcohol abuse  _____ HIV testing, infection status, or AIDS  _____ Genetic Testing    DATES OF SERVICE OR TIME PERIOD TO BE DISCLOSED: _____________  I understand and acknowledge that:  * This Authorization may be revoked at any time by you in writing, except if your health information has already been used or disclosed.  * Your health information that will be used or disclosed as a result of you signing this authorization could be re-disclosed by the recipient. If this occurs, your re-disclosed health information may no longer be protected by State or Federal laws.  * You may refuse to sign this Authorization. Your refusal will not affect your ability to obtain treatment.  * This Authorization becomes effective upon signing and will  on (date) __________.      If no date is indicated, this Authorization will  one (1) year from the signature date.    Name: Mara Hassan Sohail  Signature: Date:    1/8/2025     PLEASE FAX REQUESTED RECORDS BACK TO: [phone]

## 2025-01-08 NOTE — PROGRESS NOTES
Subjective:     History of Present Illness  The patient presents for evaluation of pruritus, cancer, depression, elevated cholesterol levels, and health maintenance.    She has been experiencing persistent pruritus, which has not yet responded to Dupixent treatment. She has completed 3 injections, including the initial loading dose, with the most recent injection administered on Sunday. The severity of the itching necessitated the use of 3 ice packs to facilitate sleep last night. [facility]    Her current medication regimen includes Herceptin and Perjeta for cancer management, as well as daily anastrozole. She is under the care of Dr. Loza at [facility], with her last consultation occurring on Monday of the previous week for chemotherapy. Routine laboratory tests are conducted during each visit. She had a mild anxiety episode following her initial cancer diagnosis in January 2021.    She reports no current symptoms of depression or anxiety. She does not recall any previous episodes of these conditions.    She has discontinued rosuvastatin due to adverse effects, including bone and muscle aches. She believes her lipid profile is within normal limits. She has undergone a CT cardiac scoring test, which did not reveal any plaque. She reports no other risk factors, including insulin resistance or diabetes.    She has a history of chickenpox in childhood. She was initially advised against receiving the shingles vaccine due to its attenuated nature, but upon reevaluation, Dr. Loza has approved her for vaccination. She recently experienced a health issue, which she believes has resolved on its own. She was prescribed an antibiotic for strep throat. She underwent colon cancer screening, which yielded normal results. During the procedure, 3 polyps were excised, one from the proximal and two from the distal colon. She is scheduled for a follow-up in 5 years. She had a DEXA scan in  "December, which showed a 6% decrease in the pelvis, but it is still a +1. She attributes this to knee injuries sustained during her running activities. Her next echocardiogram is scheduled for the end of this month.    ALLERGIES  The patient has an allergy to ROSUVASTATIN.      Current medicines (including changes today)  Current Outpatient Medications   Medication Sig Dispense Refill    anastrozole (ARIMIDEX) 1 MG Tab Take 1 tablet by mouth every day 90 Tablet 0    dupilumab (DUPIXENT) 300 MG/2ML injection Start 600mg Subcut X 1 on day 1 and then 300mg Subcut every other week thereafter 2 Each 6    Cholecalciferol (VITAMIN D3) 125 MCG (5000 UT) Cap Take 1 Capsule by mouth every day.      Trastuzumab (HERCEPTIN IV) Infuse 1 Each into a venous catheter every 21 days.      Pertuzumab (PERJETA IV) Infuse 1 Each into a venous catheter every 21 days.       No current facility-administered medications for this visit.     She  has a past medical history of Cancer (HCC) (12/2021), Cataract, COVID-19 (06/11/2022), Detached retina, right, PONV (postoperative nausea and vomiting), and Prediabetes.    ROS   No chest pain, no shortness of breath, no abdominal pain  Positive ROS as per HPI.  All other systems reviewed and are negative.     Objective:     /66 (BP Location: Right arm, Patient Position: Sitting, BP Cuff Size: Adult)   Pulse 70   Temp 36.3 °C (97.4 °F) (Temporal)   Ht 1.727 m (5' 8\")   Wt 78.1 kg (172 lb 3.2 oz)   SpO2 96%  Body mass index is 26.18 kg/m².   Physical Exam    Constitutional: Alert, no distress.  Skin: Warm, dry, good turgor, no rashes in visible areas.  Eye: Equal, round and reactive, conjunctiva clear, lids normal.  ENMT: Lips without lesions, good dentition, oropharynx clear.  Neck: Trachea midline, no masses, no thyromegaly.   Psych: Alert and oriented x3, normal affect and mood.      Results  Laboratory Studies  LDL cholesterol is above 130.    Imaging  CT cardiac scoring test showed no " plaque. DEXA scan showed a 6% decrease in the pelvis, but it is still a +1.    Testing  Colonoscopy results were normal and negative, with 3 polyps removed (one in the proximal, two in the distal).        Assessment and Plan:   The following treatment plan was discussed    Assessment & Plan  1. Pruritus.  Chronic condition. She has been on Dupixent for intractable itching, with 3 injections completed so far. Despite this, she reports no improvement in her symptoms. She will continue with the current treatment plan and follow up with her dermatologist.    2. Cancer.  History of breast with metastasis to the bone.  She is currently undergoing treatment with Herceptin, Perjeta, and anastrozole (Arimidex) for her cancer. She follows up with Dr. Loza at [facility] and had her last chemotherapy session on 06/30/2024. She will continue her current treatment regimen and follow up as scheduled.  I will try to obtain medical records from her oncology office.    3. Depression.  Deleted from diagnosis list.  She reports no current issues with depression or anxiety, and it appears to be in remission. No further action is required at this time.    4. Elevated cholesterol levels.  Chronic condition. Her LDL cholesterol level is elevated, exceeding 130. She has discontinued rosuvastatin due to side effects, including bone and muscle aches.  History of no plaque detected on CT cardiac scoring.  She will not be placed on cholesterol medication at this time but will monitor her cholesterol levels and repeat the CT test in 5 years.    5. Health maintenance.  She is advised to receive the Shingrix vaccine at [facility].  It is not attenuated.  Potential side effects, including body aches and chills, were discussed. She is advised to take ibuprofen proactively 2 hours after the vaccine, especially if she plans to go to bed soon. She should avoid strenuous activities and travel for 24 hours post-vaccination. A medical records  release will [facility]     Follow-up  The patient is scheduled for a follow-up visit in 6 months.      ORDERS:  1. Pruritus      2. Pure hypercholesterolemia      3. Agatston coronary artery calcium score less than 100          Please note that this dictation was created using voice recognition software. I have made every reasonable attempt to correct obvious errors, but I expect that there are errors of grammar and possibly content that I did not discover before finalizing the note.      Attestation      Verbal consent was acquired by the patient to use [employer] ambient listening note generation during this visit Yes

## 2025-01-22 ENCOUNTER — HOSPITAL ENCOUNTER (OUTPATIENT)
Dept: CARDIOLOGY | Facility: MEDICAL CENTER | Age: 60
End: 2025-01-22
Attending: INTERNAL MEDICINE
Payer: COMMERCIAL

## 2025-01-22 DIAGNOSIS — R23.9 SKIN CHANGE: ICD-10-CM

## 2025-01-22 DIAGNOSIS — G89.3 NEOPLASM RELATED PAIN (ACUTE) (CHRONIC): ICD-10-CM

## 2025-01-22 DIAGNOSIS — Z51.11 ENCOUNTER FOR ANTINEOPLASTIC CHEMOTHERAPY: ICD-10-CM

## 2025-01-22 DIAGNOSIS — R93.1 ABNORMAL FINDINGS ON DIAGNOSTIC IMAGING OF HEART/CORONARY CIRCULATION: ICD-10-CM

## 2025-01-22 DIAGNOSIS — D70.1 AGRANULOCYTOSIS SECONDARY TO CANCER CHEMOTHERAPY (HCC): ICD-10-CM

## 2025-01-22 DIAGNOSIS — C79.52 SECONDARY MALIGNANT NEOPLASM OF BONE AND BONE MARROW (HCC): ICD-10-CM

## 2025-01-22 DIAGNOSIS — T45.1X5A AGRANULOCYTOSIS SECONDARY TO CANCER CHEMOTHERAPY (HCC): ICD-10-CM

## 2025-01-22 DIAGNOSIS — Z86.16 PERSONAL HISTORY OF COVID-19: ICD-10-CM

## 2025-01-22 DIAGNOSIS — R42 DIZZINESS AND GIDDINESS: ICD-10-CM

## 2025-01-22 DIAGNOSIS — Z79.811 LONG TERM CURRENT USE OF AROMATASE INHIBITOR: ICD-10-CM

## 2025-01-22 DIAGNOSIS — Z51.12 ENCOUNTER FOR ANTINEOPLASTIC IMMUNOTHERAPY: ICD-10-CM

## 2025-01-22 DIAGNOSIS — I89.0 LYMPHEDEMA: ICD-10-CM

## 2025-01-22 DIAGNOSIS — C79.51 SECONDARY MALIGNANT NEOPLASM OF BONE AND BONE MARROW (HCC): ICD-10-CM

## 2025-01-22 DIAGNOSIS — C50.512: ICD-10-CM

## 2025-01-22 LAB
LV EJECT FRACT  99904: 68
LV EJECT FRACT MOD 2C 99903: 66.28
LV EJECT FRACT MOD 4C 99902: 69.68
LV EJECT FRACT MOD BP 99901: 66.63

## 2025-01-22 PROCEDURE — 93306 TTE W/DOPPLER COMPLETE: CPT

## 2025-01-22 PROCEDURE — 93306 TTE W/DOPPLER COMPLETE: CPT | Mod: 26 | Performed by: INTERNAL MEDICINE

## 2025-02-27 ENCOUNTER — OFFICE VISIT (OUTPATIENT)
Dept: DERMATOLOGY | Facility: IMAGING CENTER | Age: 60
End: 2025-02-27
Payer: COMMERCIAL

## 2025-02-27 DIAGNOSIS — L57.0 ACTINIC KERATOSIS: ICD-10-CM

## 2025-02-27 DIAGNOSIS — L28.1 PRURIGO NODULARIS: ICD-10-CM

## 2025-02-27 PROCEDURE — RXMED WILLOW AMBULATORY MEDICATION CHARGE: Performed by: NURSE PRACTITIONER

## 2025-02-27 RX ORDER — GABAPENTIN 100 MG/1
100 CAPSULE ORAL 3 TIMES DAILY
Qty: 90 CAPSULE | Refills: 1 | Status: SHIPPED | OUTPATIENT
Start: 2025-02-27

## 2025-02-27 NOTE — PROGRESS NOTES
DERMATOLOGY NOTE  FOLLOW UP VISIT       Chief complaint: Skin Lesion (Lesion on right ear ) and Follow-Up (Dupixent )     HPI: lesion   Location: Right upper ear   Time present: x 6 weeks  Painful lesion: No  Itching lesion: Yes  Enlarging lesion: No  Anything make it better or worse? Aquaphor      HX; of Prurigo nodularis pt. Says stopped Dupixent on Feb 4th  due to side effects .  ( insomnia, muscle cramps, joint aches, fatigue.)      History of skin cancer: No  History of precancers/actinic keratoses: Yes, Details: yes lip early December was treated with LN  History of biopsies:Yes, Details: lip was biopsied in June   History of blistering/severe sunburns:Yes, Details: years ago   Family history of skin cancer:Yes, Details: daughter Basal cell on right side of face   Family history of atypical moles:No      Allergies   Allergen Reactions    Crestor [Rosuvastatin] Myalgia        MEDICATIONS:  Medications relevant to specialty reviewed.     REVIEW OF SYSTEMS:   Positive for skin (see HPI)  Negative for fevers and chills       EXAM:  There were no vitals taken for this visit.  Constitutional: Well-developed, well-nourished, and in no distress.     A focused skin exam was performed including the affected areas of the R ear and LUE. Notable findings on exam today listed below and/or in assessment/plan.     Ill-defined erythematous gritty/scaly papule over the R ear helix  Few scattered erythematous papules in varying stages of healing to LUE      IMPRESSION / PLAN:    1. Prurigo nodularis, suboptimally controlled  Stopped Dupixent due to side effects  Has tried several other first line topicals with minimal improvement  Pt has good understanding of course/nature of disease  Discussed several other tx options--UV therapy, MTX, cyclosporin, humira, ILK  Has tried Rx below in the past, does give relief  Close follow up 6 weeks  - gabapentin (NEURONTIN) 100 MG Cap; Take 1 Capsule by mouth 3 times a day.  Dispense: 90  Capsule; Refill: 1    2. Actinic keratosis  CRYOTHERAPY:  Risks (including, but not limited to: skin discoloration, redness, blister, blood blister, recurrence, need for further treatment, infection, scar) and benefits of cryotherapy discussed. Patient verbally agreed to proceed with treatment. 1 cryotherapy freeze thaw cycles of 10 seconds were applied to 1 lesion on R helix with cryac. Patient tolerated procedure well. Aftercare instructions given--no specific care needed unless irritated during healing process, can apply Vaseline with small band-aid if needed.        Discussed risks, benefits, alternative treatments as well as common side effects associated with prescribed treatment, Patient verbalized understanding and agrees with plan regarding the above          Please note that this dictation was created using voice recognition software. I have made every reasonable attempt to correct obvious errors, but I expect that there are errors of grammar and possibly content that I did not discover before finalizing the note.      Return to clinic in: Return in about 6 weeks (around 4/10/2025). and as needed for any new or changing skin lesions.

## 2025-03-04 ENCOUNTER — PHARMACY VISIT (OUTPATIENT)
Dept: PHARMACY | Facility: MEDICAL CENTER | Age: 60
End: 2025-03-04
Payer: COMMERCIAL

## 2025-03-24 ENCOUNTER — HOSPITAL ENCOUNTER (OUTPATIENT)
Facility: MEDICAL CENTER | Age: 60
End: 2025-03-24
Attending: NURSE PRACTITIONER
Payer: COMMERCIAL

## 2025-03-24 LAB — AMBIGUOUS DTTM AMBI4: NORMAL

## 2025-03-24 PROCEDURE — 80053 COMPREHEN METABOLIC PANEL: CPT

## 2025-03-25 LAB
ALBUMIN SERPL BCP-MCNC: 4.2 G/DL (ref 3.2–4.9)
ALBUMIN/GLOB SERPL: 1.7 G/DL
ALP SERPL-CCNC: 70 U/L (ref 30–99)
ALT SERPL-CCNC: 18 U/L (ref 2–50)
ANION GAP SERPL CALC-SCNC: 13 MMOL/L (ref 7–16)
AST SERPL-CCNC: 27 U/L (ref 12–45)
BILIRUB SERPL-MCNC: 0.2 MG/DL (ref 0.1–1.5)
BUN SERPL-MCNC: 26 MG/DL (ref 8–22)
CALCIUM ALBUM COR SERPL-MCNC: 9.3 MG/DL (ref 8.5–10.5)
CALCIUM SERPL-MCNC: 9.5 MG/DL (ref 8.5–10.5)
CHLORIDE SERPL-SCNC: 107 MMOL/L (ref 96–112)
CO2 SERPL-SCNC: 22 MMOL/L (ref 20–33)
CREAT SERPL-MCNC: 0.92 MG/DL (ref 0.5–1.4)
GFR SERPLBLD CREATININE-BSD FMLA CKD-EPI: 71 ML/MIN/1.73 M 2
GLOBULIN SER CALC-MCNC: 2.5 G/DL (ref 1.9–3.5)
GLUCOSE SERPL-MCNC: 149 MG/DL (ref 65–99)
POTASSIUM SERPL-SCNC: 4 MMOL/L (ref 3.6–5.5)
PROT SERPL-MCNC: 6.7 G/DL (ref 6–8.2)
SODIUM SERPL-SCNC: 142 MMOL/L (ref 135–145)

## 2025-04-04 ENCOUNTER — OFFICE VISIT (OUTPATIENT)
Dept: URGENT CARE | Facility: CLINIC | Age: 60
End: 2025-04-04
Payer: COMMERCIAL

## 2025-04-04 VITALS
HEART RATE: 69 BPM | TEMPERATURE: 97.4 F | BODY MASS INDEX: 26.07 KG/M2 | WEIGHT: 172 LBS | OXYGEN SATURATION: 98 % | RESPIRATION RATE: 16 BRPM | HEIGHT: 68 IN

## 2025-04-04 DIAGNOSIS — J02.9 PHARYNGITIS, UNSPECIFIED ETIOLOGY: ICD-10-CM

## 2025-04-04 DIAGNOSIS — J06.9 VIRAL URI: ICD-10-CM

## 2025-04-04 DIAGNOSIS — R05.1 ACUTE COUGH: ICD-10-CM

## 2025-04-04 LAB
FLUAV RNA SPEC QL NAA+PROBE: NEGATIVE
FLUBV RNA SPEC QL NAA+PROBE: NEGATIVE
RSV RNA SPEC QL NAA+PROBE: NEGATIVE
S PYO DNA SPEC NAA+PROBE: NOT DETECTED
SARS-COV-2 RNA RESP QL NAA+PROBE: NEGATIVE

## 2025-04-04 PROCEDURE — 0241U POCT CEPHEID COV-2, FLU A/B, RSV - PCR: CPT | Performed by: NURSE PRACTITIONER

## 2025-04-04 PROCEDURE — 87651 STREP A DNA AMP PROBE: CPT | Performed by: NURSE PRACTITIONER

## 2025-04-04 PROCEDURE — 99213 OFFICE O/P EST LOW 20 MIN: CPT | Performed by: NURSE PRACTITIONER

## 2025-04-04 ASSESSMENT — FIBROSIS 4 INDEX: FIB4 SCORE: 1.44

## 2025-04-04 NOTE — PROGRESS NOTES
Subjective:     Melva Sauceda is a 59 y.o. female who presents for URI (X 2 days/ sore throat/ cough/ eye discharge )       URI   This is a new problem.     Ambient listening software ([employer]) used during this encounter with the consent of the patient. The following text is AI-assisted:    History of Present Illness  The patient presents for evaluation of a cold.    Cold Symptoms  She reports sudden onset of cold symptoms yesterday, including minimal sore throat, significant postnasal drainage, voice change, minor fevers, body aches, chills, excessive tearing, and clear nasal discharge when bending over. No itching.  - Onset: Sudden onset of symptoms yesterday.  - Location: Throat, nasal passages, eyes.  - Character: Minimal sore throat, significant postnasal drainage, voice change, minor fevers, body aches, chills, excessive tearing, clear nasal discharge when bending over.  - Alleviating/Aggravating Factors: Took Mucinex before bed last night.  - Timing: Symptoms started yesterday.  - Severity: Significant postnasal drainage, minor fevers, body aches, chills, excessive tearing.    She has a history of environmental allergies; visited [place] last weekend. Took Mucinex before bed last night. No ear pain or sinus tenderness. Woke up with crusty eyes and some discharge. Concerned about upcoming PET scan on Monday and wants to ensure she is healthy enough for the procedure.    ALLERGIES  - Environmental allergies    MEDICATIONS  - Mucinex    Review of Systems   All other systems reviewed and are negative.  Refer to HPI for additional details.    During this visit, appropriate PPE was worn, and hand hygiene was performed.    PMH:  has a past medical history of Cancer (HCC) (12/2021), Cataract, COVID-19 (06/11/2022), Detached retina, right, PONV (postoperative nausea and vomiting), and Prediabetes.    MEDS:   Current Outpatient Medications:     anastrozole (ARIMIDEX) 1 MG Tab, Take 1 tablet by mouth every day,  Disp: 90 Tablet, Rfl: 1    gabapentin (NEURONTIN) 100 MG Cap, Take 1 Capsule by mouth 3 times a day., Disp: 90 Capsule, Rfl: 1    dupilumab (DUPIXENT) 300 MG/2ML injection, Start 600mg Subcut X 1 on day 1 and then 300mg Subcut every other week thereafter, Disp: 2 Each, Rfl: 6    Cholecalciferol (VITAMIN D3) 125 MCG (5000 UT) Cap, Take 1 Capsule by mouth every day., Disp: , Rfl:     anastrozole (ARIMIDEX) 1 MG Tab, Take 1 tablet by mouth every day, Disp: 90 Tablet, Rfl: 0    Trastuzumab (HERCEPTIN IV), Infuse 1 Each into a venous catheter every 21 days., Disp: , Rfl:     Pertuzumab (PERJETA IV), Infuse 1 Each into a venous catheter every 21 days., Disp: , Rfl:     ALLERGIES:   Allergies   Allergen Reactions    Crestor [Rosuvastatin] Myalgia     SURGHX:   Past Surgical History:   Procedure Laterality Date    PB MASTECTOMY, PARTIAL Left 2022    Procedure: LEFT ELIAN  LOCALIZED PARTIAL MASTECTOMY, LEFT SENTINEL NODE BIOPSY, POSSIBLE AXILLARY DISSECTION, BIOABSORBABLE FIDUCIAL MARKER PLACEMENT;  Surgeon: Shane Jorge M.D.;  Location: [facility];  Service: General    NODE BIOPSY SENTINEL Left 2022    Procedure: BIOPSY, LYMPH NODE, SENTINEL;  Surgeon: Shane Jorge M.D.;  Location: [facility];  Service: General    AXILLARY NODE DISSECTION Left 2022    Procedure: LYMPHADENECTOMY,  AXILLARY;  Surgeon: Shane Jorge M.D.;  Location: [facility];  Service: General    NJ ULTRASONIC GUIDANCE, INTRAOPERATIVE Right 2021    Procedure: ULTRASOUND GUIDANCE;  Surgeon: Shane Jorge M.D.;  Location: [facility];  Service: General    CATH PLACEMENT Right 2021    Procedure: INSERTION, CATHETER - PORT A;  Surgeon: Shane Jorge M.D.;  Location: [facility];  Service: General    RETINAL DETACHMENT REPAIR Right 2014    BUNIONECTOMY Right     CATARACT EXTRACTION WITH IOL Left     GYN SURGERY       1998    LUMPECTOMY Bilateral     >20 years    OTHER       "Cataract Extraction OD     SOCHX:  reports that she has never smoked. She has never used smokeless tobacco. She reports current alcohol use of about 3.6 - 4.2 oz of alcohol per week. She reports that she does not use drugs.    FH: Per HPI as applicable/pertinent.      Objective:     Pulse 69   Temp 36.3 °C (97.4 °F) (Temporal)   Resp 16   Ht 1.727 m (5' 8\")   Wt 78 kg (172 lb)   SpO2 98%   BMI 26.15 kg/m²     Physical Exam  Nursing note reviewed.   Constitutional:       General: She is not in acute distress.     Appearance: She is well-developed. She is not ill-appearing or toxic-appearing.   HENT:      Mouth/Throat:      Pharynx: Posterior oropharyngeal erythema present.   Eyes:      General: Vision grossly intact.         Right eye: No discharge.         Left eye: No discharge.      Extraocular Movements: Extraocular movements intact.      Conjunctiva/sclera: Conjunctivae normal.      Right eye: Right conjunctiva is not injected.      Left eye: Left conjunctiva is not injected.   Cardiovascular:      Rate and Rhythm: Normal rate.      Heart sounds: Normal heart sounds.   Pulmonary:      Effort: Pulmonary effort is normal. No respiratory distress.      Breath sounds: Normal breath sounds. No stridor. No wheezing, rhonchi or rales.   Musculoskeletal:         General: No deformity. Normal range of motion.   Skin:     Coloration: Skin is not pale.   Neurological:      Mental Status: She is alert and oriented to person, place, and time.      Motor: No weakness.   Psychiatric:         Behavior: Behavior normal. Behavior is cooperative.     POCT Cepheid CoV-2, Flu A/B, RSV by PCR: negative    POCT Cepheid Group A Strep by PCR: negative       Assessment/Plan:     1. Pharyngitis, unspecified etiology  - POCT CEPHEID GROUP A STREP - PCR    2. Acute cough  - POCT CEPHEID COV-2, FLU A/B, RSV - PCR    3. Viral URI     AI-assisted A&P:   Assessment & Plan  Upper respiratory infection  - Symptoms suggest URI, potentially " common cold, influenza, or COVID-19  - Standard course typically 7 days, managed with supportive care, rest, and hydration  - Nasal swab to rule out influenza and COVID-19  - Recommend OTC NyQuil for symptom management  - Prescription syrup if necessary  - Monitor condition and return if symptoms worsen or do not improve    Differential diagnosis, natural history, supportive care, over-the-counter symptom management per 's instructions, close monitoring, and indications for immediate follow-up discussed.     All questions answered. Patient agrees with the plan of care.    Discharge summary provided via [employer].

## 2025-04-07 ASSESSMENT — VISUAL ACUITY: OU: 1

## 2025-04-11 ENCOUNTER — PHARMACY VISIT (OUTPATIENT)
Dept: PHARMACY | Facility: MEDICAL CENTER | Age: 60
End: 2025-04-11
Payer: COMMERCIAL

## 2025-04-11 PROCEDURE — RXMED WILLOW AMBULATORY MEDICATION CHARGE: Performed by: INTERNAL MEDICINE

## 2025-04-21 ENCOUNTER — HOSPITAL ENCOUNTER (OUTPATIENT)
Dept: CARDIOLOGY | Facility: MEDICAL CENTER | Age: 60
End: 2025-04-21
Attending: INTERNAL MEDICINE
Payer: COMMERCIAL

## 2025-04-21 DIAGNOSIS — Z79.811 USE OF AROMATASE INHIBITORS: ICD-10-CM

## 2025-04-21 DIAGNOSIS — C79.52 SECONDARY MALIGNANT NEOPLASM OF BONE AND BONE MARROW (HCC): ICD-10-CM

## 2025-04-21 DIAGNOSIS — G89.3 NEOPLASM RELATED PAIN: ICD-10-CM

## 2025-04-21 DIAGNOSIS — R23.9 POEMS SYNDROME: ICD-10-CM

## 2025-04-21 DIAGNOSIS — R42 DIZZINESS AND GIDDINESS: ICD-10-CM

## 2025-04-21 DIAGNOSIS — C79.51 SECONDARY MALIGNANT NEOPLASM OF BONE AND BONE MARROW (HCC): ICD-10-CM

## 2025-04-21 DIAGNOSIS — D47.2 POEMS SYNDROME: ICD-10-CM

## 2025-04-21 DIAGNOSIS — C50.512 CARCINOMA OF LOWER-OUTER QUADRANT OF FEMALE BREAST, LEFT (HCC): ICD-10-CM

## 2025-04-21 DIAGNOSIS — I89.0 OBLITERATION OF LYMPHATIC VESSEL: ICD-10-CM

## 2025-04-21 DIAGNOSIS — D70.1 NEUTROPENIA ASSOCIATED WITH MUCOSITIS DUE TO ANTINEOPLASTIC THERAPY (HCC): ICD-10-CM

## 2025-04-21 DIAGNOSIS — Z51.12 ENCOUNTER FOR ANTINEOPLASTIC IMMUNOTHERAPY: ICD-10-CM

## 2025-04-21 DIAGNOSIS — E34.9 POEMS SYNDROME: ICD-10-CM

## 2025-04-21 DIAGNOSIS — G62.9 POEMS SYNDROME: ICD-10-CM

## 2025-04-21 DIAGNOSIS — R93.1 ABNORMAL ECHOCARDIOGRAM: ICD-10-CM

## 2025-04-21 DIAGNOSIS — K12.31 NEUTROPENIA ASSOCIATED WITH MUCOSITIS DUE TO ANTINEOPLASTIC THERAPY (HCC): ICD-10-CM

## 2025-04-21 DIAGNOSIS — T45.1X5S NEUTROPENIA ASSOCIATED WITH MUCOSITIS DUE TO ANTINEOPLASTIC THERAPY (HCC): ICD-10-CM

## 2025-04-21 DIAGNOSIS — Z86.16 HISTORY OF 2019 NOVEL CORONAVIRUS DISEASE (COVID-19): ICD-10-CM

## 2025-04-21 DIAGNOSIS — Z51.11 ENCOUNTER FOR ANTINEOPLASTIC CHEMOTHERAPY: ICD-10-CM

## 2025-04-21 LAB
LV EJECT FRACT MOD 2C 99903: 73.14
LV EJECT FRACT MOD 4C 99902: 75.21
LV EJECT FRACT MOD BP 99901: 74.78

## 2025-04-21 PROCEDURE — 93306 TTE W/DOPPLER COMPLETE: CPT | Mod: 26 | Performed by: INTERNAL MEDICINE

## 2025-04-21 PROCEDURE — 93356 MYOCRD STRAIN IMG SPCKL TRCK: CPT

## 2025-04-21 NOTE — Clinical Note
Member Name: Melva Sacueda   Member Number: [id number]   Reference Number: [id number]   Approved Services: Echos and EKG   Approved Service Dates: 04/20/2025 - 08/19/2025   Requesting Provider: Carlos Loza   Requested Provider: [facility]     Dear Melva Sauceda:     The following medical service(s) requested by Carlos Loza have been approved:    Procedure Code Procedure Code Name Requested Quantity Approved Quantity Status   41217 (CPT®) [place] ECHO HEART XTHORACIC,COMPLETE W DOPPLER 1 1 Authorized       Approved Quantity means the number of visits approved for medication treatments and/or medical services.    The services should be provided by [facility] no later than 08/19/2025. Please contact the provider listed below with any questions.     Provider Information:  [facility]  [phone]    Your plan benefit may require a deductible, co-payment or coinsurance for these services. This authorization does not guarantee [facility] will pay the claim for services that you receive. Payment by [facility] for these services is subject to the terms of your Evidence of Coverage or Summary Plan Description, your eligibility at the time of service, and confirmation of benefit coverage.    For any questions or additional information, please contact Customer Service:    [facility]  Customer Service: [phone] or toll free [phone]  TTY users dial: [phone]   Call Center Hours: Mon - Fri 7 AM to 8 PM PST   Office Hours: Mon - Fri 8 AM to 5 PM [facility]   E-mail: [email or web address]   Website: [email or web address]       This information is available for free in other languages. Please contact Customer Service at the phone number above for more information. [facility] complies with applicable Federal civil rights laws and does not discriminate on the basis of race, color, national origin, age, disability or sex.      Sincerely,      Healthcare Utilization Management Department     Cc: [facility]   Carlos Loza

## 2025-04-29 PROBLEM — S83.232A COMPLEX TEAR OF MEDIAL MENISCUS OF LEFT KNEE AS CURRENT INJURY: Status: ACTIVE | Noted: 2025-04-29

## 2025-05-05 ENCOUNTER — HOSPITAL ENCOUNTER (OUTPATIENT)
Facility: MEDICAL CENTER | Age: 60
End: 2025-05-05
Attending: NURSE PRACTITIONER
Payer: COMMERCIAL

## 2025-05-05 PROCEDURE — 82306 VITAMIN D 25 HYDROXY: CPT

## 2025-05-06 LAB — 25(OH)D3 SERPL-MCNC: 91 NG/ML (ref 30–100)

## 2025-05-15 ENCOUNTER — TELEPHONE (OUTPATIENT)
Dept: CARDIOLOGY | Facility: MEDICAL CENTER | Age: 60
End: 2025-05-15
Payer: COMMERCIAL

## 2025-05-15 NOTE — TELEPHONE ENCOUNTER
TT        Caller: natalya with [employer]    Topic/issue: Their office was calling about the patient clearance that they faxed over(see in media dated 5/12/25 and 4/30/25) and they were calling to follow up on the clearance so that they can schedule the patient's surgery. Please advise        Callback Number: [phone]        Thank you      -Mark KNOX

## 2025-05-16 NOTE — TELEPHONE ENCOUNTER
Surgical clearance has been sent back to [facility] due to patient not being seen in a year. Fax: [phone]

## 2025-05-18 ENCOUNTER — OFFICE VISIT (OUTPATIENT)
Dept: URGENT CARE | Facility: CLINIC | Age: 60
End: 2025-05-18
Payer: COMMERCIAL

## 2025-05-18 VITALS
SYSTOLIC BLOOD PRESSURE: 124 MMHG | RESPIRATION RATE: 12 BRPM | OXYGEN SATURATION: 96 % | DIASTOLIC BLOOD PRESSURE: 84 MMHG | HEART RATE: 63 BPM | BODY MASS INDEX: 25.91 KG/M2 | WEIGHT: 171 LBS | TEMPERATURE: 97.1 F | HEIGHT: 68 IN

## 2025-05-18 DIAGNOSIS — H60.11 CELLULITIS OF AURICLE OF RIGHT EAR: Primary | ICD-10-CM

## 2025-05-18 PROCEDURE — 3074F SYST BP LT 130 MM HG: CPT | Performed by: STUDENT IN AN ORGANIZED HEALTH CARE EDUCATION/TRAINING PROGRAM

## 2025-05-18 PROCEDURE — 99213 OFFICE O/P EST LOW 20 MIN: CPT | Performed by: STUDENT IN AN ORGANIZED HEALTH CARE EDUCATION/TRAINING PROGRAM

## 2025-05-18 PROCEDURE — 3079F DIAST BP 80-89 MM HG: CPT | Performed by: STUDENT IN AN ORGANIZED HEALTH CARE EDUCATION/TRAINING PROGRAM

## 2025-05-18 ASSESSMENT — FIBROSIS 4 INDEX: FIB4 SCORE: 1.44

## 2025-05-18 NOTE — PROGRESS NOTES
Subjective:   CHIEF COMPLAINT  Chief Complaint   Patient presents with    Edema     Right lobe. Cellulitis? [id number]       HPI    History of Present Illness  Melva Sauceda is a 59 y.o. female who presents for evaluation of right external ear pain.    A few months ago patient had cryotherapy by her dermatologist for suspected AK of her right auricle of right ear.  Since that time states the area has not fully healed and has become red, swollen and painful.  Reports the area has been continuously scabbing over.  There has been no drainage or discharge.  No middle/inner ear pain.  Has 3 earrings in place, and skin around the earring along upper helix became red and inflamed.  She cleaned the area with alcohol and no longer has any skin irritation around the earrings.  There has been no drainage from the ear.  No fevers, chills, body aches or systemic symptoms.            REVIEW OF SYSTEMS  General: no fever or chills  GI: no nausea or vomiting  See HPI for further details.    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    Carcinoma of overlapping sites of left breast in female, estrogen receptor positive (HCC) 07/07/2022    Ankylosis of foot joint 06/20/2022    Family history of breast cancer in mother 08/13/2020    History of retinal detachment 08/13/2020    History of cataract extraction 08/13/2020    History of lumpectomy 08/13/2020    History of right breast biopsy 08/13/2020       SURGICAL HISTORY   has a past surgical history that includes bunionectomy (Right); ultrasonic guidance, intraoperative (Right, 12/31/2021); cath placement (Right, 12/31/2021);  "cataract extraction with iol (Left); gyn surgery; lumpectomy (Bilateral); other; retinal detachment repair (Right, 2014); mastectomy, partial (Left, 7/12/2022); node biopsy sentinel (Left, 7/12/2022); and axillary node dissection (Left, 7/12/2022).    ALLERGIES  Allergies[1]    CURRENT MEDICATIONS  Home Medications       Reviewed by Norris Sesay D.O. (Physician) on 05/18/25 at 1101  Med List Status: <None>     Medication Last Dose Status   anastrozole (ARIMIDEX) 1 MG Tab Taking Active   Cholecalciferol (VITAMIN D3) 125 MCG (5000 UT) Cap Taking Active   gabapentin (NEURONTIN) 100 MG Cap Taking Active   multivitamin Tab Taking Active   Pertuzumab (PERJETA IV) Taking Active   Trastuzumab (HERCEPTIN IV) Taking Active                    SOCIAL HISTORY  Social History     Tobacco Use    Smoking status: Never    Smokeless tobacco: Never   Vaping Use    Vaping status: Never Used   Substance and Sexual Activity    Alcohol use: Yes     Alcohol/week: 3.6 - 4.2 oz     Types: 6 - 7 Standard drinks or equivalent per week    Drug use: Never    Sexual activity: Not Currently     Comment: 3 children, no grands       FAMILY HISTORY  Family History   Problem Relation Age of Onset    Cancer Mother         breast and bladder    Hypertension Mother     Arrythmia Mother     Heart Failure Father           Objective:   PHYSICAL EXAM  VITAL SIGNS: /84 (BP Location: Right arm, Patient Position: Sitting, BP Cuff Size: Adult)   Pulse 63   Temp 36.2 °C (97.1 °F) (Temporal)   Resp 12   Ht 1.727 m (5' 8\")   Wt 77.6 kg (171 lb)   SpO2 96%   BMI 26.00 kg/m²     Gen: no acute distress, normal voice  Skin: dry, intact, moist mucosal membranes  Eyes: No conjunctival injection b/l  Neck: Normal range of motion. No meningeal signs.   ENT: Middle third of right auricle with erythema, mild edema with a small purulent head.  Mild localized tenderness to palpation.  No extending erythema or edema.  Lungs: No increased work of breathing.  " CTAB w/ symmetric expansion  CV: RRR w/o murmur or clicks  Psych: normal affect, normal judgement, alert, awake    Assessment/Plan:     1. Cellulitis of auricle of right ear  amoxicillin-clavulanate (AUGMENTIN) 875-125 MG Tab      No drainable abscess.  -Ordered Augmentin  -Recommend she follow-up with her dermatologist for continued evaluation  -Return to urgent care any new/worsening symptoms or further questions or concerns.  Patient understood everything discussed.  All questions were answered.          Please note that this dictation was created using voice recognition software. I have made a reasonable attempt to correct obvious errors, but I expect that there are errors of grammar and possibly content that I did not discover before finalizing the note.                [1]   Allergies  Allergen Reactions    Crestor [Rosuvastatin] Myalgia

## 2025-06-04 PROCEDURE — RXMED WILLOW AMBULATORY MEDICATION CHARGE: Performed by: NURSE PRACTITIONER

## 2025-06-07 ENCOUNTER — PHARMACY VISIT (OUTPATIENT)
Dept: PHARMACY | Facility: MEDICAL CENTER | Age: 60
End: 2025-06-07
Payer: COMMERCIAL

## 2025-06-11 ENCOUNTER — PHYSICAL THERAPY (OUTPATIENT)
Dept: PHYSICAL THERAPY | Facility: REHABILITATION | Age: 60
End: 2025-06-11
Attending: STUDENT IN AN ORGANIZED HEALTH CARE EDUCATION/TRAINING PROGRAM
Payer: COMMERCIAL

## 2025-06-11 DIAGNOSIS — R26.2 DIFFICULTY IN WALKING: Primary | ICD-10-CM

## 2025-06-11 PROCEDURE — 97161 PT EVAL LOW COMPLEX 20 MIN: CPT

## 2025-06-11 PROCEDURE — 97140 MANUAL THERAPY 1/> REGIONS: CPT

## 2025-06-11 PROCEDURE — 97014 ELECTRIC STIMULATION THERAPY: CPT

## 2025-06-11 PROCEDURE — 97110 THERAPEUTIC EXERCISES: CPT

## 2025-06-11 ASSESSMENT — ENCOUNTER SYMPTOMS
PAIN SCALE: 0
PAIN LOCATION: LATERAL JOINT LINE
PAIN SCALE AT LOWEST: 0

## 2025-06-11 NOTE — OP THERAPY DAILY TREATMENT
"  Outpatient Physical Therapy  DAILY TREATMENT     [facility] Physical [address].  [address]  [place] [address]  Phone:  [phone]  Fax:  [phone]    Date: 06/11/2025    Patient: Melva Sauceda  YOB: 1965  MRN: [id number]     Time Calculation                   Chief Complaint: No chief complaint on file.    Visit #: 10    SUBJECTIVE:  ***    OBJECTIVE:  Current objective measures: ***        Exercises/Treatment  Time-based treatments/modalities:           Pain rating (1-10) before treatment:  {PAIN NUMBERS_1-10:86892}  Pain rating (1-10) after treatment:  {PAIN NUMBERS_1-10:84765}    ASSESSMENT:   Response to treatment: ***    PLAN/RECOMMENDATIONS:   Plan for treatment: {AMB OP THERAPY - THERAPY PLAN:677192737::\"therapy treatment to continue next visit\"}.  Planned interventions for next visit: {PT PLANNED THERAPY INTERVENTIONS:842727227::\"continue with current treatment\"}.         "

## 2025-06-11 NOTE — OP THERAPY EVALUATION
Outpatient Physical Therapy  INITIAL EVALUATION    [facility] Physical Therapy [address].  [address]  [place] [address]  Phone:  [phone]  Fax:  [phone]    Date of Evaluation: 2025    Patient: Melva Sauceda  YOB: 1965  MRN: [id number]     Referring Provider: Andry Quiroga M.D.  [address] [place] [address]   Referring Diagnosis Complex tear of medial meniscus, current injury, left knee, initial encounter [S83.232A]     Time Calculation  Start time: 0753  Stop time: 0900 Time Calculation (min): 67 minutes         Chief Complaint: No chief complaint on file.    Visit Diagnoses     ICD-10-CM   1. Difficulty in walking  R26.2       Date of onset of impairment: 2025    Subjective   History of Present Illness:     History of chief complaint:  Patient underwent synovectomy partial mniscectomy L knee  L knee 25 after months of episodic flaring up with running or cycling.    Prior level of function:  Nurse    Pain:     Current pain ratin    At best pain ratin    Location:  Lateral joint line    Aggravating factors:  Sit stand w/o pain   Up more than 1 step w/o pain  Up 1 glit of stairs 2w./o pain  Ride bike  to work without pain  Run marathon   WOMAC--75% dis        Past Medical History[1]  Past Surgical History[2]    Precautions:       Objective   Observation and functional movement:  Antalgia with spc and bent knee mid stance    Range of motion and strength:    -     Palpation and joint mobility:     Primary closure w/o sign of infection--observed  buched edema due to ace wrap          Therapeutic Treatments and Modalities:     Therapeutic Treatment and Modalities Summary: Tit with comoresso  for edema managemtn--clean technique and added xeroform of portal sites  Initiated balloon smash and bounce with mre for isometric hold  Mre for hamstring in sitting  Ball roll for aarom in supine--instructed patient to purchase and  "perform hourly  Russian 10/10 cold pack over knee x 15 to quads // \"meuch better\"  -5-105      Time-based treatments/modalities:    Physical Therapy Timed Treatment Charges  Manual therapy minutes (CPT 01440): 13 minutes  Therapeutic exercise minutes (CPT 17683): 10 minutes      Assessment, Response and Plan:   Assessment details:  Patient is 3 days s/p L knee synovetomy and meniscectomy with moderate swelling and pain limiting rom.  Patient should do well for goals if compliant with poc.   Prognosis: good    Goals:   Short Term Goals:   Sit stand w/o pain  Up more than 1 step w/o pain  0-115 to get in/out of car w/o pain  WOMAC--<50%   Short term goal time span:  2-4 weeks      Long Term Goals:      Up 1 flight of stairs 2w./o pain  Ride bike  to work without pain  Run marathon 12/25  WOMAC-<15% dis    Plan:   Therapy options:  Physical therapy treatment to continue  Planned therapy interventions:  E Stim Unattended (CPT 81770), Therapeutic Exercise (CPT 96170) and Manual Therapy (CPT 05366)  Frequency:  2x week  Duration in weeks:  6  Duration in visits:  18  Plan details:  Gentle moblization., a/p joint mobs, iasnt, cupping, progressive pst. Chain strength      Functional Assessment Used        Referring provider co-signature:  I have reviewed this plan of care and my co-signature certifies the need for services.    Certification Period: 06/11/2025 to  08/13/25    Physician Signature: ________________________________ Date: ______________               [1]   Past Medical History:  Diagnosis Date    Cancer (HCC) 12/2021    Breast cancer - left    Cataract     H/O OU    COVID-19 06/11/2022    Detached retina, right     H/O    PONV (postoperative nausea and vomiting)     Prediabetes    [2]   Past Surgical History:  Procedure Laterality Date    PB KNEE SCOPE,FULL SYNOVECT Left 6/9/2025    Procedure: LEFT KNEE ARTHROSCOPY, LEFT SOFT TISSUE MASS EXCISION, SYNOVECTOMY, LEFT PARTIAL MEDIAL MENISCECTOMY, CHONDROPLASTY, " REPAIRS AS INDICATED;  Surgeon: Andry Quiroga M.D.;  Location: [facility];  Service: Orthopedics    PB KNEE SCOPE,MED OR LAT MENIS REPAIR Left 2025    Procedure: LEFT KNEE ARTHROSCOPY, LEFT SOFT TISSUE MASS EXCISION, SYNOVECTOMY, LEFT PARTIAL MEDIAL MENISCECTOMY, CHONDROPLASTY, REPAIRS AS INDICATED;  Surgeon: Andry Quiroga M.D.;  Location: [facility];  Service: Orthopedics    PB MASTECTOMY, PARTIAL Left 2022    Procedure: LEFT ELIAN  LOCALIZED PARTIAL MASTECTOMY, LEFT SENTINEL NODE BIOPSY, POSSIBLE AXILLARY DISSECTION, BIOABSORBABLE FIDUCIAL MARKER PLACEMENT;  Surgeon: Shane Joreg M.D.;  Location: [facility];  Service: General    NODE BIOPSY SENTINEL Left 2022    Procedure: BIOPSY, LYMPH NODE, SENTINEL;  Surgeon: Shane Jorge M.D.;  Location: [facility];  Service: General    AXILLARY NODE DISSECTION Left 2022    Procedure: LYMPHADENECTOMY,  AXILLARY;  Surgeon: Shane Jorge M.D.;  Location: [facility];  Service: General    MT ULTRASONIC GUIDANCE, INTRAOPERATIVE Right 2021    Procedure: ULTRASOUND GUIDANCE;  Surgeon: Shane Jorge M.D.;  Location: [facility];  Service: General    CATH PLACEMENT Right 2021    Procedure: INSERTION, CATHETER - PORT A;  Surgeon: Shane Jorge M.D.;  Location: [facility];  Service: General    RETINAL DETACHMENT REPAIR Right     BUNIONECTOMY Right     CATARACT EXTRACTION WITH IOL Left     GYN SURGERY           LUMPECTOMY Bilateral     >20 years    OTHER      Cataract Extraction OD

## 2025-06-12 ENCOUNTER — OFFICE VISIT (OUTPATIENT)
Dept: DERMATOLOGY | Facility: IMAGING CENTER | Age: 60
End: 2025-06-12
Payer: COMMERCIAL

## 2025-06-12 DIAGNOSIS — D49.2 NEOPLASM OF SKIN: Primary | ICD-10-CM

## 2025-06-12 PROCEDURE — 11102 TANGNTL BX SKIN SINGLE LES: CPT | Performed by: DERMATOLOGY

## 2025-06-12 NOTE — PROGRESS NOTES
CC: Rt ear    Subjective:  Previously seen patient here for lesion over rt helix has been treated with LN2 before. Not healing, has been seen at Urgent care and was given abx - improved but not resolved    History of skin cancer: No  History of precancers/actinic keratoses: Yes, Details: yes lip early December was treated with LN  History of biopsies:Yes, Details: lip was biopsied in June   History of blistering/severe sunburns:Yes, Details: years ago   Family history of skin cancer:Yes, Details: daughter Basal cell on right side of face   Family history of atypical moles:No      ROS: no fevers/chills. No itch.  No cough  Relevant PMH: NC  Social: NS    PE: Gen:WDWN female in NAD. Skin: focal exam: nondescript erythema of antehelix/helix with induration present. No notable crusting.     A/P: Neoplasm NOS: r ear: AK? Vs NMSC vs CHN vs other  -consent for bx, including R/B/A. Cleaned with EtOH, anesthesia with lidocaine 1% + epinephrine, shave bx, AlCl3 for hemostasis  -vaseline/bandage and wound care reviewed    F/u PRN    I have reviewed medications relevant to my specialty.

## 2025-06-17 ENCOUNTER — PHYSICAL THERAPY (OUTPATIENT)
Dept: PHYSICAL THERAPY | Facility: REHABILITATION | Age: 60
End: 2025-06-17
Attending: STUDENT IN AN ORGANIZED HEALTH CARE EDUCATION/TRAINING PROGRAM
Payer: COMMERCIAL

## 2025-06-17 DIAGNOSIS — S83.232A COMPLEX TEAR OF MEDIAL MENISCUS OF LEFT KNEE AS CURRENT INJURY, INITIAL ENCOUNTER: Primary | ICD-10-CM

## 2025-06-17 PROCEDURE — 97110 THERAPEUTIC EXERCISES: CPT

## 2025-06-17 PROCEDURE — 97014 ELECTRIC STIMULATION THERAPY: CPT

## 2025-06-17 PROCEDURE — 97161 PT EVAL LOW COMPLEX 20 MIN: CPT

## 2025-06-17 ASSESSMENT — ENCOUNTER SYMPTOMS
PAIN LOCATION: LATERAL JOINT LINE
PAIN SCALE: 0
PAIN SCALE AT LOWEST: 0

## 2025-06-17 NOTE — OP THERAPY DAILY TREATMENT
Outpatient Physical Therapy  DAILY TREATMENT     [facility] Physical Therapy [address]  [address]  [place] [address]  Phone:  [phone]  Fax:  [phone]    Date: 06/17/2025    Patient: Melva Sauceda  YOB: 1965  MRN: [id number]     Time Calculation    Start time: 0722  Stop time: 0835 Time Calculation (min): 73 minutes         Chief Complaint: No chief complaint on file.    Visit #: 10    SUBJECTIVE:  medial knee joint is hypersensitive.  Patient reports dead lift, RDL,   OBJECTIVE:  -          Therapeutic Treatments and Modalities:     Therapeutic Treatment and Modalities Summary: Reviewed importance off TKe and wuad strengtht  Reviewed gait trg with spc and focus on hip/knee ext.  Supine balloon smash TKE--standignn #@ band TKE  Prone tke ball--hep  Patellar mobs  Al tibio-femoral mobs at 90 deg and TKE  gd 2-4  Cupping to VL  ART lateral gastroc  Russian vl and lat gastroc 10/10 balloon smash and prone tke x5' ea.--;ast 5' 5/5 mhp     Time-based treatments/modalities:    Physical Therapy Timed Treatment Charges  Manual therapy minutes (CPT 96472): 5 minutes  Therapeutic exercise minutes (CPT 33143): 10 minutes      Pain rating (1-10) before treatment:    Pain rating (1-10) after treatment:    -3-110  ASSESSMENT:   Presented with significant ext lag and bent knee mid stance with poor end range quad strength  Improved functional ambulation with less antalgia .. Patient ambulation is significantly improved with use of spc  Pt. Instructed to stop all weight lifting until she does not present with an extension lag  PLAN/RECOMMENDATIONS:   Post chain focus on tke quad , use of spc as long as antalgia present and presents with ext lag

## 2025-06-17 NOTE — OP THERAPY DAILY TREATMENT
Outpatient Physical Therapy  INITIAL EVALUATION    [facility] Physical Therapy [address]  [address]  [place] [address]  Phone:  [phone]  Fax:  [phone]    Date of Evaluation: 2025    Patient: Melva Sauceda  YOB: 1965  MRN: [id number]     Referring Provider: Andry Quiroga M.D.  [address] [place] [address]   Referring Diagnosis Complex tear of medial meniscus, current injury, left knee, initial encounter [S83.232A]     Time Calculation  Start time: 722  Stop time: 835 Time Calculation (min): 73 minutes         Chief Complaint: No chief complaint on file.    Visit Diagnoses     ICD-10-CM   1. Complex tear of medial meniscus of left knee as current injury, initial encounter  S83.232A         Date of onset of impairment: 2025    Subjective   History of Present Illness:     History of chief complaint:  Patient underwent synovectomy partial mniscectomy L knee  L knee 25 after months of episodic flaring up with running or cycling.    Prior level of function:  Nurse    Pain:     Current pain ratin    At best pain ratin    Location:  Lateral joint line    Aggravating factors:  Sit stand w/o pain   Up more than 1 step w/o pain  Up 1 glit of stairs 2w./o pain  Ride bike  to work without pain  Run marathon   WOMAC--75% dis        Past Medical History[1]  Past Surgical History[2]    Precautions:       Objective   Observation and functional movement:  Antalgia with spc and bent knee mid stance    Range of motion and strength:    -20-85    Palpation and joint mobility:     Primary closure w/o sign of infection--observed  buched edema due to ace wrap          Therapeutic Treatments and Modalities:     Therapeutic Treatment and Modalities Summary: Tit with comoresso  for edema managemtn--clean technique and added xeroform of portal sites  Initiated balloon smash and bounce with mre for isometric hold  Mre for hamstring in sitting  Ball  "roll for aarom in supine--instructed patient to purchase and perform hourly  Russian 10/10 cold pack over knee x 15 to quads // \"meuch better\"  -5-105      Time-based treatments/modalities:    Physical Therapy Timed Treatment Charges  Manual therapy minutes (CPT 38445): 5 minutes  Therapeutic exercise minutes (CPT 61910): 10 minutes      Assessment, Response and Plan:   Assessment details:  Patient is 3 days s/p L knee synovetomy and meniscectomy with moderate swelling and pain limiting rom.  Patient should do well for goals if compliant with poc.   Prognosis: good    Goals:   Short Term Goals:   Sit stand w/o pain  Up more than 1 step w/o pain  0-115 to get in/out of car w/o pain  WOMAC--<50%   Short term goal time span:  2-4 weeks      Long Term Goals:      Up 1 flight of stairs 2w./o pain  Ride bike  to work without pain  Run marathon 12/25  WOMAC-<15% dis    Plan:   Therapy options:  Physical therapy treatment to continue  Planned therapy interventions:  E Stim Unattended (CPT 16312), Therapeutic Exercise (CPT 07801) and Manual Therapy (CPT 93033)  Frequency:  2x week  Duration in weeks:  6  Duration in visits:  18  Plan details:  Gentle moblization., a/p joint mobs, iasnt, cupping, progressive pst. Chain strength      Functional Assessment Used        Referring provider co-signature:  I have reviewed this plan of care and my co-signature certifies the need for services.    Certification Period: 06/17/2025 to  08/19/25    Physician Signature: ________________________________ Date: ______________               [1]   Past Medical History:  Diagnosis Date    Cancer (HCC) 12/2021    Breast cancer - left    Cataract     H/O OU    COVID-19 06/11/2022    Detached retina, right     H/O    PONV (postoperative nausea and vomiting)     Prediabetes    [2]   Past Surgical History:  Procedure Laterality Date    PB KNEE SCOPE,FULL SYNOVECT Left 6/9/2025    Procedure: LEFT KNEE ARTHROSCOPY, LEFT SOFT TISSUE MASS EXCISION, " SYNOVECTOMY, LEFT PARTIAL MEDIAL MENISCECTOMY, CHONDROPLASTY, REPAIRS AS INDICATED;  Surgeon: Andry Quiroga M.D.;  Location: [facility];  Service: Orthopedics    PB KNEE SCOPE,MED OR LAT MENIS REPAIR Left 2025    Procedure: LEFT KNEE ARTHROSCOPY, LEFT SOFT TISSUE MASS EXCISION, SYNOVECTOMY, LEFT PARTIAL MEDIAL MENISCECTOMY, CHONDROPLASTY, REPAIRS AS INDICATED;  Surgeon: Andry Quiroga M.D.;  Location: [facility];  Service: Orthopedics    PB MASTECTOMY, PARTIAL Left 2022    Procedure: LEFT ELIAN  LOCALIZED PARTIAL MASTECTOMY, LEFT SENTINEL NODE BIOPSY, POSSIBLE AXILLARY DISSECTION, BIOABSORBABLE FIDUCIAL MARKER PLACEMENT;  Surgeon: Shane Jorge M.D.;  Location: [facility];  Service: General    NODE BIOPSY SENTINEL Left 2022    Procedure: BIOPSY, LYMPH NODE, SENTINEL;  Surgeon: Shnae Jorge M.D.;  Location: [facility];  Service: General    AXILLARY NODE DISSECTION Left 2022    Procedure: LYMPHADENECTOMY,  AXILLARY;  Surgeon: Shane Jorge M.D.;  Location: [facility];  Service: General    [place] ULTRASONIC GUIDANCE, INTRAOPERATIVE Right 2021    Procedure: ULTRASOUND GUIDANCE;  Surgeon: Shane Jorge M.D.;  Location: [facility];  Service: General    CATH PLACEMENT Right 2021    Procedure: INSERTION, CATHETER - PORT A;  Surgeon: Shane Jorge M.D.;  Location: [facility];  Service: General    RETINAL DETACHMENT REPAIR Right     BUNIONECTOMY Right     CATARACT EXTRACTION WITH IOL Left     GYN SURGERY       1998    LUMPECTOMY Bilateral     >20 years    OTHER      Cataract Extraction OD

## 2025-06-19 ENCOUNTER — TELEPHONE (OUTPATIENT)
Dept: DERMATOLOGY | Facility: IMAGING CENTER | Age: 60
End: 2025-06-19
Payer: COMMERCIAL

## 2025-06-19 NOTE — TELEPHONE ENCOUNTER
Called patient to relay D-path results, Patient was grateful!  I did state that per   recommendation, to schedule for LN2 per Melva therapy was already done on a site prior and wanted to wait to see how that site healed, before moving onto other areas. I stated to Melva that I would make  aware. Melva stated she would call when ready to schedule

## 2025-06-20 ENCOUNTER — PHYSICAL THERAPY (OUTPATIENT)
Dept: PHYSICAL THERAPY | Facility: REHABILITATION | Age: 60
End: 2025-06-20
Attending: STUDENT IN AN ORGANIZED HEALTH CARE EDUCATION/TRAINING PROGRAM
Payer: COMMERCIAL

## 2025-06-20 DIAGNOSIS — S83.232A COMPLEX TEAR OF MEDIAL MENISCUS OF LEFT KNEE AS CURRENT INJURY, INITIAL ENCOUNTER: Primary | ICD-10-CM

## 2025-06-20 DIAGNOSIS — R26.2 DIFFICULTY IN WALKING: ICD-10-CM

## 2025-06-20 PROCEDURE — 97110 THERAPEUTIC EXERCISES: CPT

## 2025-06-20 PROCEDURE — 97140 MANUAL THERAPY 1/> REGIONS: CPT

## 2025-06-20 NOTE — OP THERAPY DAILY TREATMENT
Outpatient Physical Therapy  DAILY TREATMENT     [facility] Physical Therapy [address]  [address]  [place] [address]  Phone:  [phone]  Fax:  [phone]    Date: 06/20/2025    Patient: Melva Sauceda  YOB: 1965  MRN: [id number]     Time Calculation    Start time: 0717  Stop time: 0800 Time Calculation (min): 43 minutes         Chief Complaint: No chief complaint on file.    Visit #: 11    SUBJECTIVE:    Saw md yesterday to r/o infection   OBJECTIVE:  -10 -105          Therapeutic Treatments and Modalities:     Therapeutic Treatment and Modalities Summary: Reviewed importance off TKe and quad strength at end range  Patellar mobs superior glides  Balloon smash with end  range focus--heel on towel  Cupping post later gastroc  Standing straddle stance with       Time-based treatments/modalities:    Physical Therapy Timed Treatment Charges  Manual therapy minutes (CPT 15415): 15 minutes  Therapeutic exercise minutes (CPT 47563): 25 minutes      Pain rating (1-10) before treatment:  1  Pain rating (1-10) after treatment:  1    ASSESSMENT:   Saw doctor and r/o infection with cont. Hypersensitivity medial joint line. Cont. To struggle with  end range quad recruitment      PLAN/RECOMMENDATIONS:   Progressend range quad recruitment,. Iasmt, cupping post. gastroc

## 2025-06-23 ENCOUNTER — HOSPITAL ENCOUNTER (OUTPATIENT)
Facility: MEDICAL CENTER | Age: 60
End: 2025-06-23
Attending: STUDENT IN AN ORGANIZED HEALTH CARE EDUCATION/TRAINING PROGRAM
Payer: COMMERCIAL

## 2025-06-23 ENCOUNTER — PHARMACY VISIT (OUTPATIENT)
Dept: PHARMACY | Facility: MEDICAL CENTER | Age: 60
End: 2025-06-23
Payer: COMMERCIAL

## 2025-06-23 LAB
A PREV+VAG DNA SNV QL NAA+NON-PROBE: NOT DETECTED
APPEARANCE FLD: NORMAL
B FRAGILIS DNA SNV QL NAA+NON-PROBE: NOT DETECTED
BLACTX-M ISLT/SPM QL: NORMAL
BLAIMP ISLT/SPM QL: NORMAL
BLAKPC ISLT/SPM QL: NORMAL
BLAOXA-48-LIKE ISLT/SPM QL: NORMAL
BLAVIM ISLT/SPM QL: NORMAL
BODY FLD TYPE: NORMAL
C ALBICANS DNA SNV QL NAA+NON-PROBE: NOT DETECTED
C AVID+GRANUL DNA SNV QL NAA+NON-PROBE: NOT DETECTED
C PERFRINGENS SNV QL NAA+NON-PROBE: NOT DETECTED
CANDIDA DNA SNV QL NAA+NON-PROBE: NOT DETECTED
CITROBAC SP DNA SNV QL NAA+NON-PROBE: NOT DETECTED
COLOR FLD: NORMAL
CRYSTALS FLD MICRO: NORMAL
E CLOAC COMP DNA SNV QL NAA+NON-PROBE: NOT DETECTED
E COLI DNA SNV QL NAA+NON-PROBE: NOT DETECTED
E FAECALIS DNA SNV QL NAA+NON-PROBE: NOT DETECTED
E FAECIUM DNA SNV QL NAA+NON-PROBE: NOT DETECTED
F MAGNA DNA SNV QL NAA+NON-PROBE: NOT DETECTED
GP B STREP DNA SNV QL NAA+NON-PROBE: NOT DETECTED
GRAM STN SPEC: NORMAL
HAEM INFLU DNA SNV QL NAA+NON-PROBE: NOT DETECTED
K KINGAE DNA SNV QL NAA+NON-PROBE: NOT DETECTED
K. AEROGENES DNA SNV QL NAA+NON-PROBE: NOT DETECTED
K. PNEUMON GROUP DNA SNV QL NAA+NON-PRB: NOT DETECTED
LYMPHOCYTES NFR FLD: 34 %
M. MORGANII DNA SNV QL NAA+NON-PROBE: NOT DETECTED
MECA+MECC+MREJ ISLT/SPM QL: NORMAL
MONOS+MACROS NFR FLD MANUAL: 39 %
N GONORRHOEA DNA SNV QL NAA+NON-PROBE: NOT DETECTED
NDM (CARBAPENEMASE), PCR L739821A: NORMAL
NEUTROPHILS NFR FLD: 27 %
NUC CELL # FLD: 356 CELLS/UL
P AERUGINOSA DNA SNV QL NAA+NON-PROBE: NOT DETECTED
P ANAEROBIUS DNA SNV QL NAA+NON-PROBE: NOT DETECTED
P MICRA DNA SNV QL NAA+NON-PROBE: NOT DETECTED
PEPTONIPHILUS SP DNA SNV QL NAA+NON-PRB: NOT DETECTED
PROTEUS SP DNA SNV QL NAA+NON-PROBE: NOT DETECTED
RBC # FLD: 8000 CELLS/UL
S AUREUS DNA SNV QL NAA+NON-PROBE: NOT DETECTED
S LUGDUNENSIS DNA SNV QL NAA+NON-PROBE: NOT DETECTED
S MARCESCENS DNA SNV QL NAA+NON-PROBE: NOT DETECTED
S PNEUM DNA SNV QL NAA+NON-PROBE: NOT DETECTED
S PYO DNA SNV QL NAA+NON-PROBE: NOT DETECTED
SALMONELLA DNA SNV QL NAA+NON-PROBE: NOT DETECTED
SIGNIFICANT IND 70042: NORMAL
SITE SITE: NORMAL
SOURCE SOURCE: NORMAL
STREPTOCOCCUS DNA SNV QL NAA+NON-PROBE: NOT DETECTED
VANA+VANB ISLT/SPM QL: NORMAL

## 2025-06-23 PROCEDURE — 89060 EXAM SYNOVIAL FLUID CRYSTALS: CPT

## 2025-06-23 PROCEDURE — 87999 UNLISTED MICROBIOLOGY PX: CPT

## 2025-06-23 PROCEDURE — 87075 CULTR BACTERIA EXCEPT BLOOD: CPT

## 2025-06-23 PROCEDURE — 89051 BODY FLUID CELL COUNT: CPT

## 2025-06-23 PROCEDURE — 87205 SMEAR GRAM STAIN: CPT

## 2025-06-23 PROCEDURE — 87070 CULTURE OTHR SPECIMN AEROBIC: CPT

## 2025-06-23 PROCEDURE — RXMED WILLOW AMBULATORY MEDICATION CHARGE: Performed by: STUDENT IN AN ORGANIZED HEALTH CARE EDUCATION/TRAINING PROGRAM

## 2025-06-24 LAB
BACTERIA SPEC ANAEROBE CULT: NORMAL
SIGNIFICANT IND 70042: NORMAL
SITE SITE: NORMAL
SOURCE SOURCE: NORMAL

## 2025-06-26 LAB
BACTERIA FLD AEROBE CULT: NORMAL
GRAM STN SPEC: NORMAL
SIGNIFICANT IND 70042: NORMAL
SITE SITE: NORMAL
SOURCE SOURCE: NORMAL

## 2025-06-27 ENCOUNTER — PHYSICAL THERAPY (OUTPATIENT)
Dept: PHYSICAL THERAPY | Facility: REHABILITATION | Age: 60
End: 2025-06-27
Attending: STUDENT IN AN ORGANIZED HEALTH CARE EDUCATION/TRAINING PROGRAM
Payer: COMMERCIAL

## 2025-06-27 DIAGNOSIS — S83.232A COMPLEX TEAR OF MEDIAL MENISCUS OF LEFT KNEE AS CURRENT INJURY, INITIAL ENCOUNTER: Primary | ICD-10-CM

## 2025-06-27 DIAGNOSIS — R26.2 DIFFICULTY IN WALKING: ICD-10-CM

## 2025-06-27 PROCEDURE — 97140 MANUAL THERAPY 1/> REGIONS: CPT

## 2025-06-27 PROCEDURE — 97110 THERAPEUTIC EXERCISES: CPT

## 2025-06-27 PROCEDURE — 97014 ELECTRIC STIMULATION THERAPY: CPT

## 2025-06-27 NOTE — OP THERAPY DAILY TREATMENT
Outpatient Physical Therapy  DAILY TREATMENT     [facility] Physical Therapy [address]  [address]  [place] [address]  Phone:  [phone]  Fax:  [phone]    Date: 06/27/2025    Patient: Melva Sauceda  YOB: 1965  MRN: [id number]     Time Calculation    Start time: 0203  Stop time: 0300 Time Calculation (min): 57 minutes         Chief Complaint: No chief complaint on file.    Visit #: 4    SUBJECTIVE:    Pt. Called doctor and ended up draining knee and started bactrim and cephalex  OBJECTIVE:  -3-100          Therapeutic Treatments and Modalities:     Therapeutic Treatment and Modalities Summary: ART: semit tendinosis, lat gastroc,EDL  Prox fib head mobs   Iastm EDL  Cupping EDL,post later gastroc  Tke band in straddle stance focus on quad strength  Russian laq with 6ls 10/10 to quad x 10'        Time-based treatments/modalities:    Physical Therapy Timed Treatment Charges  Manual therapy minutes (CPT 30171): 15 minutes  Therapeutic exercise minutes (CPT 01214): 25 minutes      Pain rating (1-10) before treatment:  0  Pain rating (1-10) after treatment:  tired    ASSESSMENT:   Saw doctor and drained knee.  Less overall ttp but not EDL origin pain.  Cont. To struggle with  end range quad recruitment but tolerated increased vigor w/o pain      PLAN/RECOMMENDATIONS:   Progressend range quad recruitment,. Iasmt, cupping post. gastroc

## 2025-07-02 ENCOUNTER — OFFICE VISIT (OUTPATIENT)
Dept: MEDICAL GROUP | Facility: MEDICAL CENTER | Age: 60
End: 2025-07-02
Payer: COMMERCIAL

## 2025-07-02 VITALS
TEMPERATURE: 97.7 F | HEART RATE: 73 BPM | SYSTOLIC BLOOD PRESSURE: 104 MMHG | HEIGHT: 68 IN | BODY MASS INDEX: 26.22 KG/M2 | WEIGHT: 173 LBS | OXYGEN SATURATION: 97 % | DIASTOLIC BLOOD PRESSURE: 52 MMHG

## 2025-07-02 DIAGNOSIS — Z87.2 HISTORY OF ACTINIC KERATOSIS: ICD-10-CM

## 2025-07-02 DIAGNOSIS — Z98.890 S/P LEFT KNEE ARTHROSCOPY: ICD-10-CM

## 2025-07-02 DIAGNOSIS — C50.812 CARCINOMA OF OVERLAPPING SITES OF LEFT BREAST IN FEMALE, ESTROGEN RECEPTOR POSITIVE (HCC): ICD-10-CM

## 2025-07-02 DIAGNOSIS — Z12.31 ENCOUNTER FOR SCREENING MAMMOGRAM FOR BREAST CANCER: ICD-10-CM

## 2025-07-02 DIAGNOSIS — M17.0 PRIMARY OSTEOARTHRITIS OF BOTH KNEES: ICD-10-CM

## 2025-07-02 DIAGNOSIS — Z17.0 CARCINOMA OF OVERLAPPING SITES OF LEFT BREAST IN FEMALE, ESTROGEN RECEPTOR POSITIVE (HCC): ICD-10-CM

## 2025-07-02 PROBLEM — S83.232A COMPLEX TEAR OF MEDIAL MENISCUS OF LEFT KNEE AS CURRENT INJURY: Status: RESOLVED | Noted: 2025-04-29 | Resolved: 2025-07-02

## 2025-07-02 PROCEDURE — G2211 COMPLEX E/M VISIT ADD ON: HCPCS | Performed by: PHYSICIAN ASSISTANT

## 2025-07-02 PROCEDURE — 3074F SYST BP LT 130 MM HG: CPT | Performed by: PHYSICIAN ASSISTANT

## 2025-07-02 PROCEDURE — 3078F DIAST BP <80 MM HG: CPT | Performed by: PHYSICIAN ASSISTANT

## 2025-07-02 PROCEDURE — 99214 OFFICE O/P EST MOD 30 MIN: CPT | Performed by: PHYSICIAN ASSISTANT

## 2025-07-02 ASSESSMENT — FIBROSIS 4 INDEX: FIB4 SCORE: 1.44

## 2025-07-02 NOTE — LETTER
[employer]@ [facility]  Uri Dumont P.A.-C.  [address]  [place] [address]  Fax: [phone]   Authorization for Release/Disclosure of   Protected Health Information   Name: MARA HUGHES VALDEMAR : 1965 SSN: [id number]   Address: [address]  [place] [address] Phone:    There are no phone numbers on file.   I authorize the entity listed below to release/disclose the PHI below to:   [facility]/Uri Dumont P.A.-C. and Uri Dumont P.A.-C.   Provider or Entity Name:  [facility]   Address   City, State, Zip   Phone:      Fax:     Reason for request: continuity of care   Information to be released: Last office note.   [  ] LAST COLONOSCOPY,  including any PATH REPORT and follow-up  [  ] LAST FIT/COLOGUARD RESULT [  ] LAST DEXA  [  ] LAST MAMMOGRAM  [  ] LAST PAP  [  ] LAST LABS [  ] RETINA EXAM REPORT  [  ] IMMUNIZATION RECORDS  [ X ] Release all info      [  ] Check here and initial the line next to each item to release ALL health information INCLUDING  _____ Care and treatment for drug and / or alcohol abuse  _____ HIV testing, infection status, or AIDS  _____ Genetic Testing    DATES OF SERVICE OR TIME PERIOD TO BE DISCLOSED: _____________  I understand and acknowledge that:  * This Authorization may be revoked at any time by you in writing, except if your health information has already been used or disclosed.  * Your health information that will be used or disclosed as a result of you signing this authorization could be re-disclosed by the recipient. If this occurs, your re-disclosed health information may no longer be protected by State or Federal laws.  * You may refuse to sign this Authorization. Your refusal will not affect your ability to obtain treatment.  * This Authorization becomes effective upon signing and will  on (date) __________.      If no date is indicated, this Authorization will  one (1) year from the signature date.    Name: Maraallison Hughes Valdemar  Signature:  Date:   7/2/2025     PLEASE FAX REQUESTED RECORDS BACK TO: [phone]

## 2025-07-02 NOTE — PROGRESS NOTES
Subjective:     History of Present Illness  The patient presents for a follow-up visit.    She underwent knee surgery 3 weeks ago, during which a marble-sized mass was removed from behind the patella. The meniscus was also cleaned up, and some floating pieces of cartilage were removed. Approximately 10 days post-surgery, she developed an infection at the incision site. Today marks the end of her antibiotic course, which included Bactrim and Keflex. She reports significant improvement and feels that her knee is healing well. She is scheduled to see oncology on 07/17/2025 for potential therapeutic radiation for her knees.    She has osteoarthritis in her other knee, which has worsened since starting cancer treatment and anastrozole. She believes her symptoms are due to a complete depletion of estrogen in her body, causing joint pain and itchy skin.    She is due for a mammogram and has been undergoing regular lab tests through [facility] every 21 days. Her vitamin D level is 100, and all other lab results have been normal. She is likely due for another echocardiogram, which she typically has every 3 months. She is currently on Herceptin and is unsure if there is a cumulative risk associated with this medication. She sees her cancer care specialist every 21 days, with her last visit being 2 weeks ago. She is considering switching to the [facility] breast center at [facility].    She had a biopsy on her ear by dermatology that came back as actinic keratosis. It kept not healing and then suddenly got infected. She had it frozen last summer and ended up doing a shaving, which came back the same as her lip with actinic keratosis. She saw Dr. Polanco.        Current medicines (including changes today)  Current Medications[1]  She  has a past medical history of Cancer (HCC) (12/2021), Cataract, COVID-19 (06/11/2022), Detached retina, right, PONV (postoperative nausea and vomiting), and Prediabetes.    ROS   No chest pain, no  "shortness of breath, no abdominal pain  Positive ROS as per HPI.  All other systems reviewed and are negative.     Objective:     /52 (BP Location: Right arm, Patient Position: Sitting, BP Cuff Size: Adult)   Pulse 73   Temp 36.5 °C (97.7 °F) (Temporal)   Ht 1.732 m (5' 8.19\")   Wt 78.5 kg (173 lb)   SpO2 97%  Body mass index is 26.16 kg/m².   Physical Exam    Constitutional: Alert, no distress.  Skin: Warm, dry, good turgor, no rashes in visible areas.  Eye: Equal, round and reactive, conjunctiva clear, lids normal.  ENMT: Lips without lesions, good dentition, oropharynx clear.  Neck: Trachea midline, no masses, no thyromegaly.   Psych: Alert and oriented x3, normal affect and mood.      Results  Labs   - Vitamin D level: 100    Imaging   - PET scan (full body): No abnormal uptake    Diagnostic Testing   - Biopsy of ear: Actinic keratosis        Assessment and Plan:   The following treatment plan was discussed    Assessment & Plan  1. Post-surgical follow-up.  - Left fluid was knee surgery performed 3 weeks ago to remove a mass and clean up the meniscus.  - Developed an infection in the incision 10 days post-surgery; completed Bactrim and Keflex today.  - Fluid was removed recently, significantly improving her condition.  - Reports feeling normal when climbing stairs, indicating good healing progress.    2. Osteoarthritis.  Bilateral knees.  - Osteoarthritis in the other knee has worsened since starting cancer treatment and anastrozole.  - Symptoms attributed to depletion of estrogen in her body.  - Appointment with [facility] on 07/17/2025 to discuss therapeutic radiation for her knees.    3.  History of 5.  Actinic keratosis.  - Biopsy on her ear confirmed actinic keratosis; lesion was not healing and became infected.  - Treated with cryotherapy and shaving.  - Similar lesion on her lip was treated last year.    4. Health maintenance.  - Routine mammogram will be ordered as she believes she is due " for one.  - She usually goes to the imaging center on [address].    5.  Breast cancer.  - Chronic condition.  Stable. Continue follow-up care with cancer care specialist and Dr. Loza.  Request medical records release as we are lacking most recent office visit.       ORDERS:  1. S/P left knee arthroscopy      2. Primary osteoarthritis of both knees      3. Carcinoma of overlapping sites of left breast in female, estrogen receptor positive (HCC)      4. Encounter for screening mammogram for breast cancer    - MA-SCREENING MAMMO BILAT W/TOMOSYNTHESIS W/CAD; Future    5. History of actinic keratosis      () Today's E/M visit is associated with medical care services that serve as the continuing focal point for all needed health care services and/or with medical care services that are part of ongoing care related to a patient's single, serious condition or a complex condition: This includes furnishing services to patients on an ongoing basis that result in care that is personalized to the patient. The services result in a comprehensive, longitudinal, and continuous relationship with the patient and involve delivery of team-based care that is accessible, coordinated with other practitioners and providers, and integrated with the broader health care landscape.      Please note that this dictation was created using voice recognition software. I have made every reasonable attempt to correct obvious errors, but I expect that there are errors of grammar and possibly content that I did not discover before finalizing the note.      Attestation      Verbal consent was acquired by the patient to use Ident[employer] ambient listening note generation during this visit Yes                  [1]   Current Outpatient Medications   Medication Sig Dispense Refill    cephALEXin (KEFLEX) 500 MG Cap Take 1 Capsule by mouth 4 times a day for 10 days. 40 Capsule 0    sulfamethoxazole-trimethoprim (BACTRIM DS) 800-160 MG tablet Take 1  Tablet by mouth 2 times a day for 10 days. 20 Tablet 0    aspirin (ASPIRIN 81) 81 MG EC tablet Take 1 Tablet by mouth every day for 28 days. 28 Tablet 0    acetaminophen (TYLENOL) 500 MG Tab Take 1-2 Tablets by mouth every 8 hours as needed for Mild Pain, Moderate Pain or Fever for up to 30 days. Not to exceed 3000 mg per day. 90 Tablet 0    docusate sodium (COLACE) 100 MG Cap Take 1 Capsule by mouth 2 times a day as needed for Constipation for up to 30 days. 60 Capsule 0    multivitamin Tab Take 1 Tablet by mouth every day.      gabapentin (NEURONTIN) 100 MG Cap Take 1 Capsule by mouth 3 times a day. 90 Capsule 1    anastrozole (ARIMIDEX) 1 MG Tab Take 1 tablet by mouth every day 90 Tablet 0    Cholecalciferol (VITAMIN D3) 125 MCG (5000 UT) Cap Take 1 Capsule by mouth every day.      Trastuzumab (HERCEPTIN IV) Infuse 1 Each into a venous catheter every 21 days.      Pertuzumab (PERJETA IV) Infuse 1 Each into a venous catheter every 21 days.       No current facility-administered medications for this visit.      Pt transferred to holding, Tylenol given for gen pain. Placed on Tele box N. No complaints

## 2025-07-04 ENCOUNTER — HOSPITAL ENCOUNTER (INPATIENT)
Facility: MEDICAL CENTER | Age: 60
LOS: 2 days | End: 2025-07-06
Attending: STUDENT IN AN ORGANIZED HEALTH CARE EDUCATION/TRAINING PROGRAM | Admitting: HOSPITALIST
Payer: COMMERCIAL

## 2025-07-04 PROBLEM — Z01.818 PREOPERATIVE EXAMINATION: Status: ACTIVE | Noted: 2025-07-04

## 2025-07-04 PROBLEM — M00.9 SEPTIC JOINT (HCC): Status: ACTIVE | Noted: 2025-07-04

## 2025-07-04 ASSESSMENT — COGNITIVE AND FUNCTIONAL STATUS - GENERAL
MOBILITY SCORE: 24
DAILY ACTIVITIY SCORE: 24
SUGGESTED CMS G CODE MODIFIER MOBILITY: CH
SUGGESTED CMS G CODE MODIFIER DAILY ACTIVITY: CH

## 2025-07-04 ASSESSMENT — LIFESTYLE VARIABLES
HAVE YOU EVER FELT YOU SHOULD CUT DOWN ON YOUR DRINKING: NO
DOES PATIENT WANT TO STOP DRINKING: NO
HOW MANY TIMES IN THE PAST YEAR HAVE YOU HAD 5 OR MORE DRINKS IN A DAY: 0
ON A TYPICAL DAY WHEN YOU DRINK ALCOHOL HOW MANY DRINKS DO YOU HAVE: 1
EVER HAD A DRINK FIRST THING IN THE MORNING TO STEADY YOUR NERVES TO GET RID OF A HANGOVER: NO
AVERAGE NUMBER OF DAYS PER WEEK YOU HAVE A DRINK CONTAINING ALCOHOL: 5
EVER FELT BAD OR GUILTY ABOUT YOUR DRINKING: NO
ALCOHOL_USE: YES
CONSUMPTION TOTAL: NEGATIVE
TOTAL SCORE: 0
HAVE PEOPLE ANNOYED YOU BY CRITICIZING YOUR DRINKING: NO

## 2025-07-04 ASSESSMENT — SOCIAL DETERMINANTS OF HEALTH (SDOH)
WITHIN THE LAST YEAR, HAVE TO BEEN RAPED OR FORCED TO HAVE ANY KIND OF SEXUAL ACTIVITY BY YOUR PARTNER OR EX-PARTNER?: NO
WITHIN THE PAST 12 MONTHS, THE FOOD YOU BOUGHT JUST DIDN'T LAST AND YOU DIDN'T HAVE MONEY TO GET MORE: NEVER TRUE
WITHIN THE LAST YEAR, HAVE YOU BEEN KICKED, HIT, SLAPPED, OR OTHERWISE PHYSICALLY HURT BY YOUR PARTNER OR EX-PARTNER?: NO
WITHIN THE LAST YEAR, HAVE YOU BEEN AFRAID OF YOUR PARTNER OR EX-PARTNER?: NO
WITHIN THE LAST YEAR, HAVE YOU BEEN AFRAID OF YOUR PARTNER OR EX-PARTNER?: NO
IN THE PAST 12 MONTHS, HAS THE ELECTRIC, GAS, OIL, OR WATER COMPANY THREATENED TO SHUT OFF SERVICE IN YOUR HOME?: NO
WITHIN THE PAST 12 MONTHS, YOU WORRIED THAT YOUR FOOD WOULD RUN OUT BEFORE YOU GOT THE MONEY TO BUY MORE: NEVER TRUE
WITHIN THE LAST YEAR, HAVE YOU BEEN HUMILIATED OR EMOTIONALLY ABUSED IN OTHER WAYS BY YOUR PARTNER OR EX-PARTNER?: NO
WITHIN THE LAST YEAR, HAVE TO BEEN RAPED OR FORCED TO HAVE ANY KIND OF SEXUAL ACTIVITY BY YOUR PARTNER OR EX-PARTNER?: NO
WITHIN THE LAST YEAR, HAVE YOU BEEN HUMILIATED OR EMOTIONALLY ABUSED IN OTHER WAYS BY YOUR PARTNER OR EX-PARTNER?: NO
WITHIN THE LAST YEAR, HAVE YOU BEEN KICKED, HIT, SLAPPED, OR OTHERWISE PHYSICALLY HURT BY YOUR PARTNER OR EX-PARTNER?: NO

## 2025-07-04 ASSESSMENT — FIBROSIS 4 INDEX
FIB4 SCORE: 1.44
FIB4 SCORE: 1.07

## 2025-07-04 ASSESSMENT — PAIN DESCRIPTION - PAIN TYPE: TYPE: ACUTE PAIN

## 2025-07-04 ASSESSMENT — PATIENT HEALTH QUESTIONNAIRE - PHQ9
SUM OF ALL RESPONSES TO PHQ9 QUESTIONS 1 AND 2: 0
1. LITTLE INTEREST OR PLEASURE IN DOING THINGS: NOT AT ALL
2. FEELING DOWN, DEPRESSED, IRRITABLE, OR HOPELESS: NOT AT ALL

## 2025-07-05 ENCOUNTER — SURGERY (OUTPATIENT)
Age: 60
End: 2025-07-05
Payer: COMMERCIAL

## 2025-07-05 ENCOUNTER — ANESTHESIA EVENT (OUTPATIENT)
Dept: SURGERY | Facility: MEDICAL CENTER | Age: 60
End: 2025-07-05
Payer: COMMERCIAL

## 2025-07-05 ENCOUNTER — ANESTHESIA (OUTPATIENT)
Dept: SURGERY | Facility: MEDICAL CENTER | Age: 60
End: 2025-07-05
Payer: COMMERCIAL

## 2025-07-05 LAB
BACTERIA SPEC ANAEROBE CULT: ABNORMAL
BACTERIA SPEC ANAEROBE CULT: ABNORMAL
SIGNIFICANT IND 70042: ABNORMAL
SITE SITE: ABNORMAL
SOURCE SOURCE: ABNORMAL

## 2025-07-05 PROCEDURE — 700101 HCHG RX REV CODE 250: Performed by: ANESTHESIOLOGY

## 2025-07-05 PROCEDURE — 700111 HCHG RX REV CODE 636 W/ 250 OVERRIDE (IP): Mod: JZ | Performed by: ANESTHESIOLOGY

## 2025-07-05 PROCEDURE — 700105 HCHG RX REV CODE 258: Performed by: STUDENT IN AN ORGANIZED HEALTH CARE EDUCATION/TRAINING PROGRAM

## 2025-07-05 RX ORDER — METOCLOPRAMIDE HYDROCHLORIDE 5 MG/ML
INJECTION INTRAMUSCULAR; INTRAVENOUS PRN
Status: DISCONTINUED | OUTPATIENT
Start: 2025-07-05 | End: 2025-07-05 | Stop reason: SURG

## 2025-07-05 RX ORDER — ONDANSETRON 2 MG/ML
INJECTION INTRAMUSCULAR; INTRAVENOUS PRN
Status: DISCONTINUED | OUTPATIENT
Start: 2025-07-05 | End: 2025-07-05 | Stop reason: SURG

## 2025-07-05 RX ORDER — CEFAZOLIN SODIUM 1 G/3ML
INJECTION, POWDER, FOR SOLUTION INTRAMUSCULAR; INTRAVENOUS PRN
Status: DISCONTINUED | OUTPATIENT
Start: 2025-07-05 | End: 2025-07-05 | Stop reason: SURG

## 2025-07-05 RX ORDER — KETOROLAC TROMETHAMINE 15 MG/ML
INJECTION, SOLUTION INTRAMUSCULAR; INTRAVENOUS PRN
Status: DISCONTINUED | OUTPATIENT
Start: 2025-07-05 | End: 2025-07-05 | Stop reason: SURG

## 2025-07-05 RX ORDER — LIDOCAINE HYDROCHLORIDE 20 MG/ML
INJECTION, SOLUTION EPIDURAL; INFILTRATION; INTRACAUDAL; PERINEURAL PRN
Status: DISCONTINUED | OUTPATIENT
Start: 2025-07-05 | End: 2025-07-05 | Stop reason: SURG

## 2025-07-05 RX ADMIN — ONDANSETRON 4 MG: 2 INJECTION INTRAMUSCULAR; INTRAVENOUS at 08:09

## 2025-07-05 RX ADMIN — PROPOFOL 200 MG: 10 INJECTION, EMULSION INTRAVENOUS at 07:50

## 2025-07-05 RX ADMIN — METOCLOPRAMIDE HYDROCHLORIDE 5 MG: 5 INJECTION INTRAMUSCULAR; INTRAVENOUS at 08:09

## 2025-07-05 RX ADMIN — KETOROLAC TROMETHAMINE 15 MG: 15 INJECTION, SOLUTION INTRAMUSCULAR; INTRAVENOUS at 08:09

## 2025-07-05 RX ADMIN — CEFAZOLIN 2 G: 1 INJECTION, POWDER, FOR SOLUTION INTRAMUSCULAR; INTRAVENOUS at 07:50

## 2025-07-05 RX ADMIN — LIDOCAINE HYDROCHLORIDE 100 MG: 20 INJECTION, SOLUTION EPIDURAL; INFILTRATION; INTRACAUDAL; PERINEURAL at 07:50

## 2025-07-05 RX ADMIN — SODIUM CHLORIDE, POTASSIUM CHLORIDE, SODIUM LACTATE AND CALCIUM CHLORIDE: 600; 310; 30; 20 INJECTION, SOLUTION INTRAVENOUS at 07:44

## 2025-07-05 ASSESSMENT — PAIN DESCRIPTION - PAIN TYPE
TYPE: SURGICAL PAIN
TYPE: ACUTE PAIN
TYPE: SURGICAL PAIN
TYPE: SURGICAL PAIN
TYPE: CHRONIC PAIN
TYPE: ACUTE PAIN;SURGICAL PAIN

## 2025-07-05 ASSESSMENT — ENCOUNTER SYMPTOMS
SHORTNESS OF BREATH: 0
DIAPHORESIS: 0
ABDOMINAL PAIN: 0
CHILLS: 0
FEVER: 0
VOMITING: 0
NAUSEA: 0

## 2025-07-05 NOTE — CARE PLAN
The patient is Stable - Low risk of patient condition declining or worsening    Shift Goals  Clinical Goals: pain mgmt, updates  Patient Goals: rest  Family Goals: aleja    Progress made toward(s) clinical / shift goals:    Problem: Knowledge Deficit - Standard  Goal: Patient and family/care givers will demonstrate understanding of plan of care, disease process/condition, diagnostic tests and medications  Outcome: Progressing  Note: Discussed plan of care, pt able to actively participate in the learning process and demonstrates understanding by return demonstration or return explanation. Improved ability to communicate regarding their healthcare and current admission. Improved ability to identify barriers to learning and care.       Problem: Pain - Standard  Goal: Alleviation of pain or a reduction in pain to the patient’s comfort goal  Outcome: Progressing  Note: Pt reports pain well controlled with current therapy. Additional non-pharmacologic interventions implemented. Education on pain reduction goals, pain rating scale, and potential side effects of pharmacologic interventions. Demonstrates use of appropriate diversional activities and/or relaxation techniques.

## 2025-07-05 NOTE — H&P
Hospital Medicine History & Physical Note    Date of Service  7/4/2025    Primary Care Physician  Uri Dumont P.A.-C.    Consultants  Ortho    Code Status  Full Code    Chief Complaint  Chief Complaint   Patient presents with    Sent by MD     Was sent here by ortho to be admitted for clean out L knee  has infected joint       History of Presenting Illness  Melva Sauceda is a 59 y.o. female with stage IV breast cancer status post cardiotoxic chemotherapy who had a recent left knee meniscus surgery who presented 7/4/2025 with suspected septic joint.  Patient had left knee meniscus surgery recently and has been having regular follow-up with Ortho.  Unfortunately, she was found to have bacteria growing on joint aspirate.  She was sent to the hospital for intravenous antibiotics.  The patient does not have fever, chills or shortness of breath.  She does have minimal pain in her knee, but otherwise has been doing fairly well.    I discussed the plan of care with emergency physician, and the patient      Review of Systems  ROS    Past Medical History   has a past medical history of Cancer (HCC) (12/2021), Cataract, COVID-19 (06/11/2022), Detached retina, right, PONV (postoperative nausea and vomiting), and Prediabetes.    Surgical History   has a past surgical history that includes bunionectomy (Right); pr ultrasonic guidance, intraoperative (Right, 12/31/2021); cath placement (Right, 12/31/2021); cataract extraction with iol (Left); gyn surgery; lumpectomy (Bilateral); other; retinal detachment repair (Right, 2014); pr mastectomy, partial (Left, 7/12/2022); node biopsy sentinel (Left, 7/12/2022); axillary node dissection (Left, 7/12/2022); pr knee scope,full synovect (Left, 6/9/2025); and pr knee scope,med or lat menis repair (Left, 6/9/2025).     Family History  Family History   Problem Relation Age of Onset    Cancer Mother         breast and bladder    Hypertension Mother     Arrythmia Mother     Heart Failure  Father      Social History   reports that she has never smoked. She has never used smokeless tobacco. She reports current alcohol use of about 3.6 - 4.2 oz of alcohol per week. She reports that she does not use drugs.    Allergies  Allergies[1]    Medications  Prior to Admission Medications   Prescriptions Last Dose Informant Patient Reported? Taking?   Cholecalciferol (VITAMIN D3) 125 MCG (5000 UT) Cap   Yes No   Sig: Take 1 Capsule by mouth every day.   Pertuzumab (PERJETA IV)  Patient Yes No   Sig: Infuse 1 Each into a venous catheter every 21 days.   Trastuzumab (HERCEPTIN IV)  Patient Yes No   Sig: Infuse 1 Each into a venous catheter every 21 days.   acetaminophen (TYLENOL) 500 MG Tab   No No   Sig: Take 1-2 Tablets by mouth every 8 hours as needed for Mild Pain, Moderate Pain or Fever for up to 30 days. Not to exceed 3000 mg per day.   anastrozole (ARIMIDEX) 1 MG Tab   No No   Sig: Take 1 tablet by mouth every day   aspirin (ASPIRIN 81) 81 MG EC tablet   No No   Sig: Take 1 Tablet by mouth every day for 28 days.   docusate sodium (COLACE) 100 MG Cap   No No   Sig: Take 1 Capsule by mouth 2 times a day as needed for Constipation for up to 30 days.   gabapentin (NEURONTIN) 100 MG Cap   No No   Sig: Take 1 Capsule by mouth 3 times a day.   multivitamin Tab   Yes No   Sig: Take 1 Tablet by mouth every day.      Facility-Administered Medications: None       Physical Exam  Temp:  [36.3 °C (97.4 °F)-36.5 °C (97.7 °F)] 36.5 °C (97.7 °F)  Pulse:  [60-67] 60  Resp:  [14-16] 14  BP: (160)/(73) 160/73  SpO2:  [95 %-96 %] 96 %      Temperature: 36.3 °C (97.4 °F)   Pulse: 67   Respiration: 16   Pulse Oximetry: 95 %     Laboratory:  Recent Labs     07/04/25 1950   WBC 5.7   RBC 3.84*   HEMOGLOBIN 12.4   HEMATOCRIT 37.1   MCV 96.6   MCH 32.3   MCHC 33.4   RDW 45.3   PLATELETCT 258   MPV 8.8*     Recent Labs     07/04/25 1950   SODIUM 143   POTASSIUM 3.9   CHLORIDE 109   CO2 20   GLUCOSE 88   BUN 21   CREATININE 0.71  "  CALCIUM 9.1     Recent Labs     07/04/25 1950   ALTSGPT 17   ASTSGOT 19   ALKPHOSPHAT 61   TBILIRUBIN <0.2   GLUCOSE 88     Recent Labs     07/04/25 1950   APTT 31.8   INR 0.87     No results for input(s): \"NTPROBNP\" in the last 72 hours.      No results for input(s): \"TROPONINT\" in the last 72 hours.    Imaging:  No orders to display     Assessment/Plan:  Justification for Admission Status  I anticipate this patient will require at least two midnights for appropriate medical management, necessitating inpatient admission because patient has septic joint.  Will require intravenous antibiotics, orthopedic consultation and surgical intervention.    Patient will need a Med/Surg bed on MEDICAL service.      * Septic joint (HCC)- (present on admission)  Assessment & Plan  Arthrocentesis analysis is growing C. Acnes   I will start ceftriaxone, following cultures and sensitivities.  Plan for surgery with Ortho tomorrow  N.p.o. after midnight     Preoperative examination- (present on admission)  Assessment & Plan  Medical Assessment Risk:  High    Age 59   History of breast cancer s/p chemotherapy  Has ongoing infection requiring intravenous antibiotics  cardiac arrhythmias    Surgical Risk:   Intermediate    Cardiovascular:   Received cardiotoxic chemotherapy in the past  No known history of ischemic heart disease or heart failure   I will order a Pre-op EKG    Pulmonary:  At higher risk for atelectasis/hypoxia.  I will order continuous pulse oximetry  Emphasized incentive spirometry       Renal:   I will start intravenous fluids  I will order follow-up BUN and creatinine     Neurologic:   Avoid fentanyl (short-acting)  Acetaminophen PO TID PRN  Try to minimize using opioid Pain medications.    If patient develops delirium or agitation consider quetiapine 12.5 mg PO x1 PRN agitation (can repeat x1 in 2 hours PRN agitation).      Hematologic:  Plan on pharmacologic DVT prophylaxis post operative day #1. Hold for " decreasing hemoglobin. Notify provider for hemoglobin less than 8.    Primary osteoarthritis of both knees- (present on admission)  Assessment & Plan  Multimodal pain control    Pure hypercholesterolemia- (present on admission)  Assessment & Plan  Cardiac diet      VTE prophylaxis: SCDs/TEDs         [1]   Allergies  Allergen Reactions    Crestor [Rosuvastatin] Myalgia

## 2025-07-05 NOTE — ASSESSMENT & PLAN NOTE
Medical Assessment Risk:  High    Age 59   History of breast cancer s/p chemotherapy  Has ongoing infection requiring intravenous antibiotics  cardiac arrhythmias    Surgical Risk:   Intermediate    Cardiovascular:   Received cardiotoxic chemotherapy in the past  No known history of ischemic heart disease or heart failure   I will order a Pre-op EKG    Pulmonary:  At higher risk for atelectasis/hypoxia.  I will order continuous pulse oximetry  Emphasized incentive spirometry       Renal:   I will start intravenous fluids  I will order follow-up BUN and creatinine     Neurologic:   Avoid fentanyl (short-acting)  Acetaminophen PO TID PRN  Try to minimize using opioid Pain medications.    If patient develops delirium or agitation consider quetiapine 12.5 mg PO x1 PRN agitation (can repeat x1 in 2 hours PRN agitation).      Hematologic:  Plan on pharmacologic DVT prophylaxis post operative day #1. Hold for decreasing hemoglobin. Notify provider for hemoglobin less than 8.

## 2025-07-05 NOTE — ANESTHESIA PREPROCEDURE EVALUATION
Case: [id number] Date/Time: 07/05/25 0730    Procedure: ARTHROSCOPY, KNEE i and d (Left: Knee)    Anesthesia type: General    Pre-op diagnosis: infected joint    Location: [facility] OR 02 / SURGERY [facility]    Surgeons: Shiva Deleon M.D.            Relevant Problems   CARDIAC   (positive) Mild mitral regurgitation      Other   (positive) Carcinoma of overlapping sites of left breast in female, estrogen receptor positive (HCC)   (positive) Primary osteoarthritis of both knees   (positive) Secondary carcinoma of bone (HCC)   (positive) Septic joint (HCC)       Physical Exam    Airway   Mallampati: III  TM distance: >3 FB  Neck ROM: full       Cardiovascular - normal exam  Rhythm: regular  Rate: normal    (-) murmur     Dental - normal exam           Pulmonary - normal examBreath sounds clear to auscultation     Abdominal    Neurological - normal exam                   Anesthesia Plan    ASA 3 (Septic joint)       Plan - general       Airway plan will be LMA          Induction: intravenous      Pertinent diagnostic labs and testing reviewed    Informed Consent:    Anesthetic plan and risks discussed with patient.

## 2025-07-05 NOTE — OR NURSING
Pt allergies and NPO status verified, belongings secured. Pt verbalizes understanding of the pain scale, expected course of stay, and plan of care.  Surgical site verified with pt. Pt has port accesses, flushed with good blood return. CHG wipes completed while in preop.  Sequentials placed on RLE. Dr. Curry at bedside, discussed plan with patient.

## 2025-07-05 NOTE — PROGRESS NOTES
4 Eyes Skin Assessment Completed by CAMMY Oleary and CAMMY Fermin.    Skin assessment is primarily focused on high risk bony prominences. Pay special attention to skin beneath and around medical devices, high risk bony prominences, skin to skin areas and areas where the patient lacks sensation to feel pain and areas where the patient previously had breakdown.     Head (Occipital):  WDL   Ears (Under Medical Devices): WDL   Nose (Under Medical Devices): WDL   Mouth:  WDL   Neck: WDL   Breast/Chest:  WDL   Shoulder Blades:  WDL   Spine:   WDL   (R) Arm/Elbow/Hand: WDL   (L) Arm/Elbow/Hand: Scratch to upper arm   Abdomen: WDL   Pannus/Groin:  WDL   Sacrum/Coccyx:   WDL   (R) Ischial Tuberosity (Sit Bones):  WDL   (L) Ischial Tuberosity (Sit Bones):  WDL   (R) Leg:  WDL   (L) Leg:  Edema and Scar   (R) Heel:  WDL   (R) Foot/Toe: WDL   (L) Heel: WDL   (L) Foot/Toe:  WDL       DEVICES IN USE:   Respiratory Devices:  NA, patient on room air  Feeding Devices:  N/A   Lines & BP Monitoring Devices:  Port    Orthopedic Devices:  N/A  Miscellaneous Devices:  N/A    PROTOCOL INTERVENTIONS:   Standard/Trauma Bed:  Already in place    WOUND PHOTOS:   N/A no wounds identified    WOUND CONSULT:   N/A, no advanced wound care needs identified\

## 2025-07-05 NOTE — ANESTHESIA POSTPROCEDURE EVALUATION
Patient: Melva Sauceda    Procedure Summary       Date: 07/05/25 Room / Location:  OR  / SURGERY [facility]    Anesthesia Start: 0744 Anesthesia Stop: 0821    Procedure: ARTHROSCOPY, KNEE i and d (Left: Knee) Diagnosis: (Left knee infection)    Surgeons: Shiva Deleon M.D. Responsible Provider: Nahid De Jesus M.D.    Anesthesia Type: general ASA Status: 3            Final Anesthesia Type: general  Last vitals  BP   Blood Pressure: (!) 155/71    Temp   36 °C (96.8 °F)    Pulse   62   Resp   12    SpO2   97 %      Anesthesia Post Evaluation    Patient location during evaluation: PACU  Patient participation: complete - patient participated  Level of consciousness: awake and alert    Airway patency: patent  Anesthetic complications: no  Cardiovascular status: hemodynamically stable  Respiratory status: acceptable  Hydration status: euvolemic    PONV: none          There were no known notable events for this encounter.     Nurse Pain Score: 3 (NPRS)           14-Jun-2023 07-Jun-2023

## 2025-07-05 NOTE — ASSESSMENT & PLAN NOTE
Arthrocentesis analysis grew C. Acnes   ID input greatly appreciated  Await preliminary on operative cultures.  Patient is to be on Zyvox tentatively for 2 weeks-duration to be adjusted pending cultures  Follow-up with infectious disease in 2 weeks

## 2025-07-05 NOTE — ANESTHESIA TIME REPORT
Anesthesia Start and Stop Event Times       Date Time Event    7/5/2025 0743 Ready for Procedure     0744 Anesthesia Start     0821 Anesthesia Stop          Responsible Staff  07/05/25      Name Role Begin End    Nahid De Jesus M.D. Anesth 0744 0821          Overtime Reason:  no overtime (within assigned shift)    Comments:

## 2025-07-05 NOTE — CONSULTS
INFECTIOUS DISEASES INPATIENT CONSULT NOTE     Date of Service: 7/5/2025    Consult Requested By: Kristen Richardson M.D.    Reason for Consultation: Possible left knee infection    History of Present Illness:   Melva Sauceda is a 59 y.o. woman with a history of stage IV breast cancer status post cardiotoxic chemotherapy in remission on aromatase inhibitors, and recent left knee meniscus surgery on 6/9/2025 admitted 7/4/2025 for planned surgical intervention.  Extensive review of emergency physician notes, hospital medicine notes and consultant notes performed.  Patient noted a soft tissue mass in the left knee over a month ago.  She underwent left knee arthroscopy with excision of the anterior soft tissue mass, partial lateral meniscectomy of the left knee, chondroplasty of patella, trochlea, medial lateral femoral condyles and partial synovectomy on 6/9/2025.  Pathology of soft tissue biopsy of the left knee revealed pigmented villonodular synovitis but negative for malignancy.  She did note some lateral knee swelling and erythema.  She subsequently patient synovial fluid analysis on 6/23 revealing 356 nucleated cells, monocyte predominant.  No crystals seen.  Joint panel PCR was negative.  However culture grew cutibacterium acnes.  She was prescribed a 10-day course of Bactrim and Keflex by orthopedic surgeon.  When the cultures returned positive for cutibacterium's, her surgeon contacted her for surgical intervention.  The patient is now status post left knee arthroscopic incision and drainage today.  Per the procedure note, there was no clear abscesses or extension of any potentially infectious joint fluid or tissue was found.  However there was 45 cc of some slightly cloudy synovial fluid.  Operative cultures are currently pending.  She was currently not on any antibiotics.      All other review of systems reviewed and negative except those documented above in the HPI.     PMH:   Past Medical  History[1]    PSH:  Past Surgical History[2]    FAMILY HX:  Family History   Problem Relation Age of Onset    Cancer Mother         breast and bladder    Hypertension Mother     Arrythmia Mother     Heart Failure Father        SOCIAL HX:  Social History     Socioeconomic History    Marital status: Single     Spouse name: Not on file    Number of children: Not on file    Years of education: Not on file    Highest education level: Associate degree: academic program   Occupational History    Not on file   Tobacco Use    Smoking status: Never    Smokeless tobacco: Never   Vaping Use    Vaping status: Never Used   Substance and Sexual Activity    Alcohol use: Yes     Alcohol/week: 3.6 - 4.2 oz     Types: 6 - 7 Standard drinks or equivalent per week    Drug use: Never    Sexual activity: Not Currently     Comment: 3 children, no grands   Other Topics Concern    Not on file   Social History Narrative    Not on file     Social Drivers of Health     Financial Resource Strain: Low Risk  (9/17/2024)    Overall Financial Resource Strain (CARDIA)     Difficulty of Paying Living Expenses: Not very hard   Food Insecurity: No Food Insecurity (7/4/2025)    Hunger Vital Sign     Worried About Running Out of Food in the Last Year: Never true     Ran Out of Food in the Last Year: Never true   Transportation Needs: No Transportation Needs (7/4/2025)    PRAPARE - Transportation     Lack of Transportation (Medical): No     Lack of Transportation (Non-Medical): No   Physical Activity: Sufficiently Active (9/17/2024)    Exercise Vital Sign     Days of Exercise per Week: 5 days     Minutes of Exercise per Session: 60 min   Stress: No Stress Concern Present (9/17/2024)    [employer] [place] [employer] - Occupational Stress Questionnaire     Feeling of Stress : Not at all   Social Connections: Moderately Integrated (9/17/2024)    Social Connection and Isolation Panel [NHANES]     Frequency of Communication with Friends and Family:  "More than three times a week     Frequency of Social Gatherings with Friends and Family: Once a week     Attends Cheondo[employer] Services: 1 to 4 times per year     Active Member of Clubs or Organizations: No     Attends Club or Organization Meetings: 1 to 4 times per year     Marital Status:    Intimate Partner Violence: Not At Risk (2025)    Humiliation, Afraid, Rape, and Kick questionnaire     Fear of Current or Ex-Partner: No     Emotionally Abused: No     Physically Abused: No     Sexually Abused: No   Housing Stability: Low Risk  (2025)    Housing Stability Vital Sign     Unable to Pay for Housing in the Last Year: No     Number of Times Moved in the Last Year: 0     Homeless in the Last Year: No     Tobacco Use History[3]  Social History     Substance and Sexual Activity   Alcohol Use Yes    Alcohol/week: 3.6 - 4.2 oz    Types: 6 - 7 Standard drinks or equivalent per week       Allergies/Intolerances:  Allergies[4]    History reviewed with the patient     Other Current Medications:  Current Medications[5]  [unfilled]    Most Recent Vital Signs:  /71   Pulse (!) 54   Temp 35.9 °C (96.7 °F) (Temporal)   Resp 18   Ht 1.727 m (5' 7.99\")   Wt 78.7 kg (173 lb 8 oz)   SpO2 96%   BMI 26.39 kg/m²   Temp  Av.2 °C (97.2 °F)  Min: 35.9 °C (96.7 °F)  Max: 36.7 °C (98.1 °F)    Physical Exam:  General: well nourished, no diaphoresis, well-appearing, no acute distress  HEENT: sclera anicteric, extraocular muscles intact, mucous membranes moist, oropharynx clear and moist, no oral lesions or exudate  Neck: supple, no lymphadenopathy  Chest: CTAB, no rhonchi or wheezes, normal work of breathing.  Cardiac: regular rate and rhythm, normal S1 S2, no murmurs, rubs or gallops  Abdomen: + bowel sounds, soft, non-tender, non-distended  Extremities: WWP, no edema, 2+ pedal pulses, left knee in surgical dressing  Skin: warm and dry, no rashes or worrisome lesions  Neuro: Alert and oriented times 3, " non-focal exam, speech fluent, full range of motion to bilateral upper and lower extremities  Psych: normal mood and behavior, pleasant; memory intact, normal judgement    Pertinent Lab Results:  Recent Labs     07/04/25 1950 07/05/25 0225   WBC 5.7 4.8      Recent Labs     07/04/25 1950 07/05/25 0225   HEMOGLOBIN 12.4 12.4   HEMATOCRIT 37.1 37.8   MCV 96.6 95.9   MCH 32.3 31.5   PLATELETCT 258 241         Recent Labs     07/04/25 1950 07/05/25 0225   SODIUM 143 140   POTASSIUM 3.9 4.2   CHLORIDE 109 108   CO2 20 22   CREATININE 0.71 0.65        Recent Labs     07/04/25 1950 07/05/25 0225   ALBUMIN 4.0 3.8        Pertinent Micro:  Results       Procedure Component Value Units Date/Time    FLUID CULTURE W/GRAM STAIN [[id number]] Collected: 07/05/25 0802    Order Status: No result Specimen: Synovial Updated: 07/05/25 0923    BLOOD CULTURE [[id number]] Collected: 07/05/25 0000    Order Status: Canceled Specimen: Other from Peripheral     BLOOD CULTURE [[id number]] Collected: 07/05/25 0000    Order Status: Canceled Specimen: Other from Peripheral     BLOOD CULTURE x2 [[id number]] Collected: 07/04/25 2005    Order Status: Completed Specimen: Blood from Peripheral Updated: 07/04/25 2219     Significant Indicator NEG     Source BLD     Site PERIPHERAL     Culture Result No Growth  Note: Blood cultures are incubated for 5 days and  are monitored continuously.Positive blood cultures  are called to the RN and reported as soon as  they are identified.  Blood culture testing and Gram stain, if indicated, are  performed at [facility],  [address] [place] [address].  Positive blood  cultures are sent to [facility],  [address] [place] [address], for organism identification  and susceptibility testing.      BLOOD CULTURE x2 [[id number]] Collected: 07/04/25 1950    Order Status: Completed Specimen: Blood from Peripheral Updated: 07/04/25 2219     Significant Indicator NEG      "Source BLD     Site port     Culture Result No Growth  Note: Blood cultures are incubated for 5 days and  are monitored continuously.Positive blood cultures  are called to the RN and reported as soon as  they are identified.  Blood culture testing and Gram stain, if indicated, are  performed at [facility],  [address] [place] [address].  Positive blood  cultures are sent to [facility],  [address] [place] [address], for organism identification  and susceptibility testing.            No results found for: \"BLOODCULTU\", \"BLDCULT\", \"BCHOLD\"     Studies:  No results found.    IMPRESSION:   Possible left knee infection  Recent left knee meniscal tear status post surgery on 6/9  Positive fluid cultures with Cutibacterium acnes  Stage IV breast cancer status post chemotherapy    ASSESSMENT/PLAN:   Melva Sauceda is a 59 y.o. woman with history of stage IV breast cancer status postchemotherapy and left knee meniscal tear status post surgery on 6/9 admitted for planned surgical intervention.  She had a knee aspiration done on 6/23 with fluid analysis not consistent with infection.  Joint panel PCR was negative however anaerobic cultures grew cutibacterium acnes.  She is now status post left knee arthroscopic incision and drainage today.  Per the procedure note, there was about 45 cc of slightly cloudy synovial fluid present but no clear abscess or extension of any potential infectious fluid around the joint.  Cutibacterium acnes is a skin commensal and is typically a causative agent for prosthetic joint infections.  Her fluid analysis is not consistent with infection.  But will follow or cultures.    -Start oral linezolid 600 mg twice daily.  This will cover the most common pathogens including Staphylococcus, Streptococcus and cutibacterium  -Follow operative cultures-pending  - Anticipate a 14-day course of antibiotics from 7/5 with stop date of 7/19/2025  -Final " antibiotic recommendations pending or cultures    Follow-up in ID clinic with NP 2 weeks postdischarge.  Please place a referral to the infectious disease clinic      Plan of care discussed with KAROL Richardson M.D.. Will continue to follow    Carmen Dumas M.D.      Please note that this dictation was created using voice recognition software. I have worked with technical experts from [facility] to optimize the interface.  I have made every reasonable attempt to correct obvious errors, but there may be errors of grammar and possibly content that I did not discover before finalizing the note.         [1]   Past Medical History:  Diagnosis Date    Cancer (HCC) 12/2021    Breast cancer - left    Cataract     H/O OU    COVID-19 06/11/2022    Detached retina, right     H/O    PONV (postoperative nausea and vomiting)     Prediabetes    [2]   Past Surgical History:  Procedure Laterality Date    PB KNEE SCOPE,FULL SYNOVECT Left 6/9/2025    Procedure: LEFT KNEE ARTHROSCOPY, LEFT SOFT TISSUE MASS EXCISION, SYNOVECTOMY, LEFT PARTIAL MEDIAL MENISCECTOMY, CHONDROPLASTY, REPAIRS AS INDICATED;  Surgeon: Andry Quiroga M.D.;  Location: [facility];  Service: Orthopedics    PB KNEE SCOPE,MED OR LAT MENIS REPAIR Left 6/9/2025    Procedure: LEFT KNEE ARTHROSCOPY, LEFT SOFT TISSUE MASS EXCISION, SYNOVECTOMY, LEFT PARTIAL MEDIAL MENISCECTOMY, CHONDROPLASTY, REPAIRS AS INDICATED;  Surgeon: Andry Quiroga M.D.;  Location: [facility];  Service: Orthopedics    PB MASTECTOMY, PARTIAL Left 7/12/2022    Procedure: LEFT ELIAN  LOCALIZED PARTIAL MASTECTOMY, LEFT SENTINEL NODE BIOPSY, POSSIBLE AXILLARY DISSECTION, BIOABSORBABLE FIDUCIAL MARKER PLACEMENT;  Surgeon: Shane Jorge M.D.;  Location: [facility];  Service: General    NODE BIOPSY SENTINEL Left 7/12/2022    Procedure: BIOPSY, LYMPH NODE, SENTINEL;  Surgeon: Shane Jorge M.D.;  Location: [facility];   Service: General    AXILLARY NODE DISSECTION Left 2022    Procedure: LYMPHADENECTOMY,  AXILLARY;  Surgeon: Shane Jorge M.D.;  Location: SUR[facility];  Service: General    [place] ULTRASONIC GUIDANCE, INTRAOPERATIVE Right 2021    Procedure: ULTRASOUND GUIDANCE;  Surgeon: Shane Jorge M.D.;  Location: SURGERY [facility];  Service: General    CATH PLACEMENT Right 2021    Procedure: INSERTION, CATHETER - PORT A;  Surgeon: Shane Jorge M.D.;  Location: SURGERY [facility];  Service: General    RETINAL DETACHMENT REPAIR Right     BUNIONECTOMY Right     CATARACT EXTRACTION WITH IOL Left     GYN SURGERY           LUMPECTOMY Bilateral     >20 years    OTHER      Cataract Extraction OD   [3]   Social History  Tobacco Use   Smoking Status Never   Smokeless Tobacco Never   [4]   Allergies  Allergen Reactions    Crestor [Rosuvastatin] Myalgia   [5]   Current Facility-Administered Medications:     lactated ringers infusion, , Intravenous, Continuous, Shiva Deleon M.D., Last Rate: 10 mL/hr at 25 0727, New Bag at 25 0744    [START ON 2025] aspirin EC tablet 81 mg, 81 mg, Oral, DAILY, Deandre Valladares M.D.    gabapentin (Neurontin) capsule 100 mg, 100 mg, Oral, QHS PRN, Deandre Valladares M.D., 100 mg at 25    acetaminophen (Tylenol) tablet 650 mg, 650 mg, Oral, Q6HRS PRN, Deandre Valladares M.D.    senna-docusate (Pericolace Or Senokot S) 8.6-50 MG per tablet 2 Tablet, 2 Tablet, Oral, Q EVENING **AND** polyethylene glycol/lytes (Miralax) Packet 1 Packet, 1 Packet, Oral, QDAY PRN, Deandre Valladares M.D.    oxyCODONE immediate-release (Roxicodone) tablet 2.5 mg, 2.5 mg, Oral, Q3HRS PRN **OR** oxyCODONE immediate-release (Roxicodone) tablet 5 mg, 5 mg, Oral, Q3HRS PRN **OR** HYDROmorphone (Dilaudid) injection 0.25 mg, 0.25 mg, Intravenous, Q3HRS PRN, Asepiter Valladares M.D.    ondansetron (Zofran) syringe/vial injection 4 mg, 4 mg, Intravenous, Q4HRS PRN, Asem A  TONYA Valladares    ondansetron (Zofran ODT) dispertab 4 mg, 4 mg, Oral, Q4HRS PRN, Deandre Valladares M.D.    promethazine (Phenergan) tablet 12.5-25 mg, 12.5-25 mg, Oral, Q4HRS PRN, Deandre Valladares M.D.    promethazine (Phenergan) suppository 12.5-25 mg, 12.5-25 mg, Rectal, Q4HRS PRN, Deandre Valladares M.D.    prochlorperazine (Compazine) injection 5-10 mg, 5-10 mg, Intravenous, Q4HRS PRN, Deandre Valladares M.D.    anastrozole (Arimidex) tablet 1 mg, 1 mg, Oral, DAILY, Domo Wills M.D., 1 mg at 07/05/25 0629

## 2025-07-05 NOTE — PROGRESS NOTES
[facility] Medicine Daily Progress Note    Date of Service  7/5/2025    Chief Complaint  Melva Sauceda is a 59 y.o. female admitted 7/4/2025 with infected left knee following meniscal surgery    Hospital Course  No notes on file    Interval Problem Update  Patient had meniscal surgery but following this developed wound infection and had joint aspiration in the office that grew C acnes.  Presents for washout.    Patient seen in PACU, ID consulted, plan of care reviewed with ID.  ID stated to wait till tomorrow to see what preliminary cultures showed but anticipate patient can go home on Zyvox for 2 weeks and follow-up with them in the office    I have discussed this patient's plan of care and discharge plan at IDT rounds today with Case Management, Nursing, Nursing leadership, and other members of the IDT team.    Consultants/Specialty  infectious disease and orthopedics    Code Status  Full Code    Disposition  The patient is not medically cleared for discharge to home or a post-acute facility.  Anticipate discharge to: home with close outpatient follow-up    I have placed the appropriate orders for post-discharge needs.    Review of Systems  Review of Systems   Constitutional:  Negative for chills, diaphoresis and fever.   Respiratory:  Negative for shortness of breath.    Cardiovascular:  Negative for chest pain.   Gastrointestinal:  Negative for abdominal pain, nausea and vomiting.   Musculoskeletal:  Positive for joint pain.        Physical Exam  Temp:  [35.9 °C (96.7 °F)-36.7 °C (98.1 °F)] 36.3 °C (97.3 °F)  Pulse:  [54-85] 57  Resp:  [12-20] 18  BP: (115-168)/(52-90) 140/63  SpO2:  [95 %-98 %] 96 %    Physical Exam  Vitals and nursing note reviewed.   Constitutional:       General: She is not in acute distress.     Appearance: Normal appearance. She is normal weight. She is not ill-appearing.   HENT:      Head: Normocephalic and atraumatic.      Nose: Nose normal.      Mouth/Throat:      Mouth: Mucous  membranes are moist.   Eyes:      General:         Right eye: No discharge.         Left eye: No discharge.      Extraocular Movements: Extraocular movements intact.      Pupils: Pupils are equal, round, and reactive to light.   Cardiovascular:      Rate and Rhythm: Normal rate and regular rhythm.   Pulmonary:      Effort: Pulmonary effort is normal. No respiratory distress.      Breath sounds: Normal breath sounds. No wheezing.   Abdominal:      General: Bowel sounds are normal. There is no distension.      Palpations: Abdomen is soft.      Tenderness: There is no abdominal tenderness.   Musculoskeletal:      Right lower leg: No edema.      Left lower leg: No edema.   Skin:     General: Skin is warm and dry.   Neurological:      General: No focal deficit present.      Mental Status: She is alert and oriented to person, place, and time.      Cranial Nerves: No cranial nerve deficit.      Motor: No weakness.   Psychiatric:         Mood and Affect: Mood normal.         Behavior: Behavior normal.         Fluids    Intake/Output Summary (Last 24 hours) at 7/5/2025 1415  Last data filed at 7/5/2025 1300  Gross per 24 hour   Intake 900 ml   Output --   Net 900 ml        Laboratory  Recent Labs     07/04/25 1950 07/05/25 0225   WBC 5.7 4.8   RBC 3.84* 3.94*   HEMOGLOBIN 12.4 12.4   HEMATOCRIT 37.1 37.8   MCV 96.6 95.9   MCH 32.3 31.5   MCHC 33.4 32.8   RDW 45.3 44.9   PLATELETCT 258 241   MPV 8.8* 8.4*     Recent Labs     07/04/25 1950 07/05/25 0225   SODIUM 143 140   POTASSIUM 3.9 4.2   CHLORIDE 109 108   CO2 20 22   GLUCOSE 88 88   BUN 21 20   CREATININE 0.71 0.65   CALCIUM 9.1 8.7     Recent Labs     07/04/25 1950   APTT 31.8   INR 0.87               Imaging  No orders to display        Assessment/Plan  * Septic joint (HCC)- (present on admission)  Assessment & Plan  Arthrocentesis analysis grew C. Acnes   ID input greatly appreciated  Await preliminary on operative cultures.  Patient is to be on Zyvox tentatively  for 2 weeks-duration to be adjusted pending cultures  Follow-up with infectious disease in 2 weeks      Preoperative examination- (present on admission)  Assessment & Plan  Medical Assessment Risk:  High    Age 59   History of breast cancer s/p chemotherapy  Has ongoing infection requiring intravenous antibiotics  cardiac arrhythmias    Surgical Risk:   Intermediate    Cardiovascular:   Received cardiotoxic chemotherapy in the past  No known history of ischemic heart disease or heart failure   I will order a Pre-op EKG    Pulmonary:  At higher risk for atelectasis/hypoxia.  I will order continuous pulse oximetry  Emphasized incentive spirometry       Renal:   I will start intravenous fluids  I will order follow-up BUN and creatinine     Neurologic:   Avoid fentanyl (short-acting)  Acetaminophen PO TID PRN  Try to minimize using opioid Pain medications.    If patient develops delirium or agitation consider quetiapine 12.5 mg PO x1 PRN agitation (can repeat x1 in 2 hours PRN agitation).      Hematologic:  Plan on pharmacologic DVT prophylaxis post operative day #1. Hold for decreasing hemoglobin. Notify provider for hemoglobin less than 8.    Primary osteoarthritis of both knees- (present on admission)  Assessment & Plan  Multimodal pain control    Pure hypercholesterolemia- (present on admission)  Assessment & Plan  Cardiac diet         VTE prophylaxis:   SCDs/TEDs      I have performed a physical exam and reviewed and updated ROS and Plan today (7/5/2025). In review of yesterday's note (7/4/2025), there are no changes except as documented above.

## 2025-07-05 NOTE — OR NURSING
0819: Patient arrived to PACU from OR via gurney. Report received from anesthesia and RN. Cold pack applied.     0827: family updated    0849: pt meets criteria for transfer to stage II. Report called to receiving RN

## 2025-07-05 NOTE — OP REPORT
Operative Report    PreOp Diagnosis: 1.  Left knee suspected infection      PostOp Diagnosis: Same      Procedure(s):  Left knee arthroscopic incision and drainage    Surgeon(s):  Shiva Deleon M.D.    Anesthesiologist/Type of Anesthesia:  Anesthesiologist: No responsible provider has been recorded for the case. /General    Surgical Staff:  First assist: none      Specimens removed if any:  * No specimens in log *    Estimated Blood Loss: Minimal    Findings: There was a moderate effusion present difficulty with full extension once a sleeve, once the scope was placed a large fluid collection was drained off about 45 cc in total of some slightly cloudy synovial fluid.  Diagnostic arthroscopy showed 2/3 patellar chondromalacia, suprapatellar pouch medial lateral gutter showed no loose bodies, medial compartment demonstrated no meniscus tearing and grade 2/3 chondromalacia, ACL and PCL were intact in the notch, lateral compartment demonstrated no recurrent meniscus tear and grade 2/3 chondromalacia, some hemosiderin stained devitalized tissue in the anterior portion of the knee synovitis on the lateral aspect as well and in the suprapatellar pouch    Complications: None    Indications for surgery:  The patient had a left knee arthroscopic surgery knee injury.  There was an aspirate performed that revealed C acnes as result she was indicated for arthroscopic I&D.   Risk-benefit alternatives were discussed he wished to proceed.    Procedure in detail:  The patient was met in the preoperative holding area where the left knee was marked.  Risk benefits and alternatives were again discussed and consent was signed.  They were taken back to the operative suite induced under general anesthesia please supine regular bed.  Th left leg was then prepped and draped in the usual sterile fashion a timeout was performed verifying surgical site surgical procedure perioperative antibiotics implants and staff.   We then made our lateral  portal from an outside fashion medial portal and direct visualization with a spinal needle.  Diagnostic arthroscopy was performed with the findings listed above.  We then further debrided some of the hemosiderin stained tissue and some of the mildly devitalized looking tissue we also cleaned up and debrided areas of chondromalacia to stable borders.  We ran about 6 L of fluid through the knee and total and all fluid at the end appearance of the joint was clear no abscesses or extension of any of the potentially infectious joint fluid or tissue was found..  At this point we removed all excess fluid closed with 3-0 nylon for the subcuticular stitches.  Steri-Strips sterile dressings were applied.  The patient was then awoken from general anesthesia and taken to the PACU without complications.

## 2025-07-05 NOTE — ANESTHESIA PROCEDURE NOTES
Airway    Date/Time: 7/5/2025 7:50 AM    Performed by: Nahid De Jesus M.D.  Authorized by: Nahid De eJsus M.D.    Location:  OR  Urgency:  Elective  Indications for Airway Management:  Anesthesia      Spontaneous Ventilation: absent    Sedation Level:  Deep  Preoxygenated: Yes    Final Airway Type:  Supraglottic airway  Final Supraglottic Airway:  Standard LMA    SGA Size:  3  Number of Attempts at Approach:  1

## 2025-07-05 NOTE — ED TRIAGE NOTES
"Pulse 67   Temp 36.3 °C (97.4 °F) (Temporal)   Resp 16   Ht 1.727 m (5' 8\")   Wt 78.7 kg (173 lb 8 oz)   SpO2 95%   BMI 26.38 kg/m²   Chief Complaint   Patient presents with    Sent by MD     Was sent here by ortho to be admitted for clean out L knee  has infected joint       Comes in by herself  told to come here for admission and impending surgery for clean out of L knee infection  to be done by ortho   "

## 2025-07-05 NOTE — CARE PLAN
The patient is Stable - Low risk of patient condition declining or worsening    Shift Goals  Clinical Goals: update POC  Patient Goals: rest    Progress made toward(s) clinical / shift goals:    Problem: Knowledge Deficit - Standard  Goal: Patient and family/care givers will demonstrate understanding of plan of care, disease process/condition, diagnostic tests and medications  Outcome: Progressing       Patient is not progressing towards the following goals:

## 2025-07-05 NOTE — H&P
Surgery Orthopedic History & Physical Note    Date  7/5/2025    Primary Care Physician  Uri Dumont P.A.-C.    CC  Left knee infection    HPI  This is a 59 y.o. female who presented with with history stage IV breast cancer and chemotherapy who underwent a left knee arthroscopic medial meniscectomy and  she had had some lingering pain and effusion aspiration was performed with results coming back with growth of C acnes.  Currently she complains of some mild pain and effusion.    Past Medical History[1]    Past Surgical History[2]    Current Medications[3]    Social History     Socioeconomic History    Marital status: Single     Spouse name: Not on file    Number of children: Not on file    Years of education: Not on file    Highest education level: Associate degree: academic program   Occupational History    Not on file   Tobacco Use    Smoking status: Never    Smokeless tobacco: Never   Vaping Use    Vaping status: Never Used   Substance and Sexual Activity    Alcohol use: Yes     Alcohol/week: 3.6 - 4.2 oz     Types: 6 - 7 Standard drinks or equivalent per week    Drug use: Never    Sexual activity: Not Currently     Comment: 3 children, no grands   Other Topics Concern    Not on file   Social History Narrative    Not on file     Social Drivers of Health     Financial Resource Strain: Low Risk  (9/17/2024)    Overall Financial Resource Strain (CARDIA)     Difficulty of Paying Living Expenses: Not very hard   Food Insecurity: No Food Insecurity (7/4/2025)    Hunger Vital Sign     Worried About Running Out of Food in the Last Year: Never true     Ran Out of Food in the Last Year: Never true   Transportation Needs: No Transportation Needs (7/4/2025)    PRAPARE - Transportation     Lack of Transportation (Medical): No     Lack of Transportation (Non-Medical): No   Physical Activity: Sufficiently Active (9/17/2024)    Exercise Vital Sign     Days of Exercise per Week: 5 days     Minutes of Exercise per  Session: 60 min   Stress: No Stress Concern Present (9/17/2024)    [employer] [place] [employer] - Occupational Stress Questionnaire     Feeling of Stress : Not at all   Social Connections: Moderately Integrated (9/17/2024)    Social Connection and Isolation Panel [NHANES]     Frequency of Communication with Friends and Family: More than three times a week     Frequency of Social Gatherings with Friends and Family: Once a week     Attends [employer] Services: 1 to 4 times per year     Active Member of Clubs or Organizations: No     Attends Club or Organization Meetings: 1 to 4 times per year     Marital Status:    Intimate Partner Violence: Not At Risk (7/4/2025)    Humiliation, Afraid, Rape, and Kick questionnaire     Fear of Current or Ex-Partner: No     Emotionally Abused: No     Physically Abused: No     Sexually Abused: No   Housing Stability: Low Risk  (7/4/2025)    Housing Stability Vital Sign     Unable to Pay for Housing in the Last Year: No     Number of Times Moved in the Last Year: 0     Homeless in the Last Year: No       Family History   Problem Relation Age of Onset    Cancer Mother         breast and bladder    Hypertension Mother     Arrythmia Mother     Heart Failure Father        Allergies  Crestor [rosuvastatin]    Review of Systems  Negative except for that noted in the HPI    Physical Exam  Left knee:  Mild to moderate effusion still present  Incisions well-healed  Range of motion is near full  Vital Signs  Blood Pressure: (!) 168/87   Temperature: 36.2 °C (97.2 °F)   Pulse: (!) 56   Respiration: 16   Pulse Oximetry: 96 %       Labs:  Recent Labs     07/04/25 1950 07/05/25 0225   WBC 5.7 4.8   RBC 3.84* 3.94*   HEMOGLOBIN 12.4 12.4   HEMATOCRIT 37.1 37.8   MCV 96.6 95.9   MCH 32.3 31.5   MCHC 33.4 32.8   RDW 45.3 44.9   PLATELETCT 258 241   MPV 8.8* 8.4*     Recent Labs     07/04/25 1950 07/05/25 0225   SODIUM 143 140   POTASSIUM 3.9 4.2   CHLORIDE 109 108   CO2 20 22    GLUCOSE 88 88   BUN 21 20   CREATININE 0.71 0.65   CALCIUM 9.1 8.7     Recent Labs     07/04/25 1950   APTT 31.8   INR 0.87     Recent Labs     07/04/25 1950 07/05/25  0225   ASTSGOT 19 22   ALTSGPT 17 15   TBILIRUBIN <0.2 0.3   ALKPHOSPHAT 61 58   GLOBULIN 2.4 2.3   INR 0.87  --        Radiology:  No orders to display         Assessment/Plan:  59-year-old female 1 month status post left knee arthroscopic medial meniscectomy with postoperative C acnes infection    Discussion with the patient regarding exam findings and the aspirate results.  We discussed C acnes being a somewhat low variant organism that can cause continued pain and effusions as result we will recommend arthroscopic I&D with readmission back to the medicine service with hopeful infectious disease consult for finalization of antibiotics.  Have discussed this case with Dr. Quiroga he is in agreement on addressing the patient's knee like this.         [1]   Past Medical History:  Diagnosis Date    Cancer (HCC) 12/2021    Breast cancer - left    Cataract     H/O OU    COVID-19 06/11/2022    Detached retina, right     H/O    PONV (postoperative nausea and vomiting)     Prediabetes    [2]   Past Surgical History:  Procedure Laterality Date    PB KNEE SCOPE,FULL SYNOVECT Left 6/9/2025    Procedure: LEFT KNEE ARTHROSCOPY, LEFT SOFT TISSUE MASS EXCISION, SYNOVECTOMY, LEFT PARTIAL MEDIAL MENISCECTOMY, CHONDROPLASTY, REPAIRS AS INDICATED;  Surgeon: Andry Quiroga M.D.;  Location: [facility];  Service: Orthopedics    PB KNEE SCOPE,MED OR LAT MENIS REPAIR Left 6/9/2025    Procedure: LEFT KNEE ARTHROSCOPY, LEFT SOFT TISSUE MASS EXCISION, SYNOVECTOMY, LEFT PARTIAL MEDIAL MENISCECTOMY, CHONDROPLASTY, REPAIRS AS INDICATED;  Surgeon: Andry Quiroga M.D.;  Location: [facility];  Service: Orthopedics    PB MASTECTOMY, PARTIAL Left 7/12/2022    Procedure: LEFT ELIAN  LOCALIZED PARTIAL MASTECTOMY, LEFT SENTINEL NODE  BIOPSY, POSSIBLE AXILLARY DISSECTION, BIOABSORBABLE FIDUCIAL MARKER PLACEMENT;  Surgeon: Shane Jorge M.D.;  Location: SURGERY [facility];  Service: General    NODE BIOPSY SENTINEL Left 2022    Procedure: BIOPSY, LYMPH NODE, SENTINEL;  Surgeon: Shane Jorge M.D.;  Location: SURGERY [facility];  Service: General    AXILLARY NODE DISSECTION Left 2022    Procedure: LYMPHADENECTOMY,  AXILLARY;  Surgeon: Shane Jorge M.D.;  Location: SURGERY [facility];  Service: General    NY ULTRASONIC GUIDANCE, INTRAOPERATIVE Right 2021    Procedure: ULTRASOUND GUIDANCE;  Surgeon: Shane Jorge M.D.;  Location: SURGERY [facility];  Service: General    CATH PLACEMENT Right 2021    Procedure: INSERTION, CATHETER - PORT A;  Surgeon: Shane Jorge M.D.;  Location: SURGERY [facility];  Service: General    RETINAL DETACHMENT REPAIR Right     BUNIONECTOMY Right     CATARACT EXTRACTION WITH IOL Left     GYN SURGERY           LUMPECTOMY Bilateral     >20 years    OTHER      Cataract Extraction OD   [3]   Current Facility-Administered Medications   Medication Dose Route Frequency Provider Last Rate Last Admin    lactated ringers infusion   Intravenous Continuous Shiva Deleon M.D. 10 mL/hr at 25 0727 New Bag at 25 07    [MAR Hold] aspirin EC tablet 81 mg  81 mg Oral DAILY Deandre Valladares M.D.        [MAR Hold] gabapentin (Neurontin) capsule 100 mg  100 mg Oral QHS PRN Asem SOY Valladares M.D.   100 mg at 25    [MAR Hold] acetaminophen (Tylenol) tablet 650 mg  650 mg Oral Q6HRS PRN Asem SOY Valladares M.D.        [MAR Hold] senna-docusate (Pericolace Or Senokot S) 8.6-50 MG per tablet 2 Tablet  2 Tablet Oral Q EVENING Asem SOY Valladares M.D.        And    [MAR Hold] polyethylene glycol/lytes (Miralax) Packet 1 Packet  1 Packet Oral QDAY PRN Deandre Valladares M.D.        [MAR Hold] oxyCODONE immediate-release (Roxicodone) tablet 2.5 mg  2.5 mg Oral Q3HRS PRN Asepiter Bahasher,  M.D.        Or    [MAR Hold] oxyCODONE immediate-release (Roxicodone) tablet 5 mg  5 mg Oral Q3HRS PRN Asem SOY Valladares M.D.        Or    [MAR Hold] HYDROmorphone (Dilaudid) injection 0.25 mg  0.25 mg Intravenous Q3HRS PRN Asem SOY Valladares M.D.        [MAR Hold] ondansetron (Zofran) syringe/vial injection 4 mg  4 mg Intravenous Q4HRS PRN Asem SOY Valladares M.D.        [MAR Hold] ondansetron (Zofran ODT) dispertab 4 mg  4 mg Oral Q4HRS PRN Asem SOY Valladares M.D.        [MAR Hold] promethazine (Phenergan) tablet 12.5-25 mg  12.5-25 mg Oral Q4HRS PRN Asem SOY Valladares M.D.        [MAR Hold] promethazine (Phenergan) suppository 12.5-25 mg  12.5-25 mg Rectal Q4HRS PRN Asem SOY Valladares M.D.        [MAR Hold] prochlorperazine (Compazine) injection 5-10 mg  5-10 mg Intravenous Q4HRS PRN Asem SOY Valladares M.D.        [MAR Hold] anastrozole (Arimidex) tablet 1 mg  1 mg Oral DAILY Domo Wills M.D.   1 mg at 07/05/25 0629

## 2025-07-05 NOTE — ED NOTES
Medication history reviewed with patient at bedside. Med rec is complete    Allergies reviewed    Patient completed 10 days of bactrim and keflex in the last 30 days.    Patient receives outpatient infusion of perjeta and herceptin at [facility]       Any anticoagulants taken in the last 14 days? No      Dispense history available in [facility] at the time of the medication history? Yes        Joselyn Jacobs Pharm.D., [facility]  ER Clinical Pharmacist

## 2025-07-05 NOTE — ED PROVIDER NOTES
ED Provider Note    CHIEF COMPLAINT  Chief Complaint   Patient presents with    Sent by MD     Was sent here by ortho to be admitted for clean out L knee  has infected joint         EXTERNAL RECORDS REVIEWED  Outpatient Notes reviewed orthopedic office visit notes as well as synovial fluid analysis for this patient found to have C. Acnes in the synovial fluid and referred here for admission for OR washout    HPI/ROS  LIMITATION TO HISTORY   Select: : None  OUTSIDE HISTORIAN(S):  None    Melva Sauceda is a 59 y.o. female who presents at the recommendation of her orthopedic surgeon Dr. Quiroga for concern of joint infection.  The patient had a left knee meniscus repair and mass removal in the recent past.  She had what seem to be a local skin infection thereafter however a joint aspiration was done by the orthopedic surgeon which found out today grew out C. Acnes.  He was referred here for admission for joint washout.  Patient is having minimal to no pain in that knee.  There are no skin changes.  She is having no fevers.  Her history is notable for stage IV breast cancer still an active treatment.    PAST MEDICAL HISTORY   has a past medical history of Cancer (HCC) (12/2021), Cataract, COVID-19 (06/11/2022), Detached retina, right, PONV (postoperative nausea and vomiting), and Prediabetes.    SURGICAL HISTORY   has a past surgical history that includes bunionectomy (Right); ultrasonic guidance, intraoperative (Right, 12/31/2021); cath placement (Right, 12/31/2021); cataract extraction with iol (Left); gyn surgery; lumpectomy (Bilateral); other; retinal detachment repair (Right, 2014); mastectomy, partial (Left, 7/12/2022); node biopsy sentinel (Left, 7/12/2022); axillary node dissection (Left, 7/12/2022); knee scope,full synovect (Left, 6/9/2025); and knee scope,med or lat menis repair (Left, 6/9/2025).    FAMILY HISTORY  Family History   Problem Relation Age of Onset    Cancer Mother         breast and bladder     "Hypertension Mother     Arrythmia Mother     Heart Failure Father        SOCIAL HISTORY  Social History     Tobacco Use    Smoking status: Never    Smokeless tobacco: Never   Vaping Use    Vaping status: Never Used   Substance and Sexual Activity    Alcohol use: Yes     Alcohol/week: 3.6 - 4.2 oz     Types: 6 - 7 Standard drinks or equivalent per week    Drug use: Never    Sexual activity: Not Currently     Comment: 3 children, no grands       CURRENT MEDICATIONS  Home Medications       Reviewed by VALERIA Jacobs PharmD (Pharmacist) on 07/04/25 at 2020  Med List Status: Complete     Medication Last Dose Status   acetaminophen (TYLENOL) 500 MG Tab Not Taking Active   anastrozole (ARIMIDEX) 1 MG Tab 7/4/2025 Active   aspirin (ASPIRIN 81) 81 MG EC tablet 7/4/2025 Active   Cholecalciferol (VITAMIN D3) 125 MCG (5000 UT) Cap Unknown Active   docusate sodium (COLACE) 100 MG Cap Not Taking Active   gabapentin (NEURONTIN) 100 MG Cap 7/3/2025 Active   multivitamin Tab Unknown Active   Pertuzumab (PERJETA IV) 6/19/2025 Active   Trastuzumab (HERCEPTIN IV) 6/19/2025 Active                    ALLERGIES  Allergies[1]    PHYSICAL EXAM  VITAL SIGNS: Pulse 67   Temp 36.3 °C (97.4 °F) (Temporal)   Resp 16   Ht 1.727 m (5' 8\")   Wt 78.7 kg (173 lb 8 oz)   SpO2 95%   BMI 26.38 kg/m²    Pulse oximetry interpretation: I interpret the pulse oximetry as normal.  Constitutional: Awake and alert. No acute distress.  Head: NCAT.  HEENT: Normal Conjunctiva.  Neck: Grossly normal range of motion. Airway midline.  Cardiovascular: Normal heart rate, Normal rhythm.  Thorax & Lungs: No respiratory distress. Clear to Auscultation bilaterally.  Abdomen: Normal inspection. Nontender. Nondistended  Skin: No obvious rash.  Well-healed left knee surgical incision sites  Back: No midline tenderness.  Musculoskeletal: No obvious deformity. Moves all extremities Well.  Neurologic: A&Ox4.  Ambulatory  Psychiatric: Mood and affect are appropriate for " situation.    EKG/LABS  Results for orders placed or performed during the hospital encounter of 07/04/25   CBC WITH DIFFERENTIAL    Collection Time: 07/04/25  7:50 PM   Result Value Ref Range    WBC 5.7 4.8 - 10.8 K/uL    RBC 3.84 (L) 4.20 - 5.40 M/uL    Hemoglobin 12.4 12.0 - 16.0 g/dL    Hematocrit 37.1 37.0 - 47.0 %    MCV 96.6 81.4 - 97.8 fL    MCH 32.3 27.0 - 33.0 pg    MCHC 33.4 32.2 - 35.5 g/dL    RDW 45.3 35.9 - 50.0 fL    Platelet Count 258 164 - 446 K/uL    MPV 8.8 (L) 9.0 - 12.9 fL    Neutrophils-Polys 59.40 44.00 - 72.00 %    Lymphocytes 28.60 22.00 - 41.00 %    Monocytes 8.80 0.00 - 13.40 %    Eosinophils 2.50 0.00 - 6.90 %    Basophils 0.50 0.00 - 1.80 %    Immature Granulocytes 0.20 0.00 - 0.90 %    Nucleated RBC 0.00 0.00 - 0.20 /100 WBC    Neutrophils (Absolute) 3.37 1.82 - 7.42 K/uL    Lymphs (Absolute) 1.62 1.00 - 4.80 K/uL    Monos (Absolute) 0.50 0.00 - 0.85 K/uL    Eos (Absolute) 0.14 0.00 - 0.51 K/uL    Baso (Absolute) 0.03 0.00 - 0.12 K/uL    Immature Granulocytes (abs) 0.01 0.00 - 0.11 K/uL    NRBC (Absolute) 0.00 K/uL   COMP METABOLIC PANEL    Collection Time: 07/04/25  7:50 PM   Result Value Ref Range    Sodium 143 135 - 145 mmol/L    Potassium 3.9 3.6 - 5.5 mmol/L    Chloride 109 96 - 112 mmol/L    Co2 20 20 - 33 mmol/L    Anion Gap 14.0 7.0 - 16.0    Glucose 88 65 - 99 mg/dL    Bun 21 8 - 22 mg/dL    Creatinine 0.71 0.50 - 1.40 mg/dL    Calcium 9.1 8.5 - 10.5 mg/dL    Correct Calcium 9.1 8.5 - 10.5 mg/dL    AST(SGOT) 19 12 - 45 U/L    ALT(SGPT) 17 2 - 50 U/L    Alkaline Phosphatase 61 30 - 99 U/L    Total Bilirubin <0.2 0.1 - 1.5 mg/dL    Albumin 4.0 3.2 - 4.9 g/dL    Total Protein 6.4 6.0 - 8.2 g/dL    Globulin 2.4 1.9 - 3.5 g/dL    A-G Ratio 1.7 g/dL   APTT    Collection Time: 07/04/25  7:50 PM   Result Value Ref Range    APTT 31.8 24.7 - 36.0 sec   PROTHROMBIN TIME (INR)    Collection Time: 07/04/25  7:50 PM   Result Value Ref Range    PT 12.1 12.0 - 14.6 sec    INR 0.87 0.87 - 1.13    LACTIC ACID    Collection Time: 07/04/25  7:50 PM   Result Value Ref Range    Lactic Acid 1.6 0.5 - 2.0 mmol/L   CRP QUANTITIVE (NON-CARDIAC)    Collection Time: 07/04/25  7:50 PM   Result Value Ref Range    Stat C-Reactive Protein <0.30 0.00 - 0.75 mg/dL   ESTIMATED GFR    Collection Time: 07/04/25  7:50 PM   Result Value Ref Range    GFR (CKD-EPI) 97 >60 mL/min/1.73 m 2     Sinus rhythm 86 bpm.  No acute ST or T wave changes.  Normal intervals.  I have independently interpreted this EKG    COURSE & MEDICAL DECISION MAKING    ASSESSMENT, COURSE AND PLAN  Care Narrative:   Patient-year-old female history of breast cancer and recent left knee meniscus repair and mass removal presents at the recommendation of her orthopedic surgeon for concern of a joint infection with C. Acnes  Overall normal vitals  On exam skin and musculoskeletal exam of the knee are benign.  She has well-healed surgical scars.  Heart and lungs are benign.  Spoke with pharmacy will order 2 g ceftriaxone IV based on bacteria on culture.  Labs obtained for preoperative clearance and are benign.  Patient is n.p.o. at midnight.  I spoke with Dr. Hernandez who agrees for plan for OR in the morning  Hospitalist accepted patient.      ADDITIONAL PROBLEMS MANAGED  none    DISPOSITION AND DISCUSSIONS  I have discussed management of the patient with the following physicians and PO's:    Dr. Mary Valladares    Discussion of management with other [facility] or appropriate source(s): Pharmacy antibiotics     FINAL DIAGNOSIS  1. Bacterial infection of knee joint (HCC)    2. Malignant neoplasm of female breast, unspecified estrogen receptor status, unspecified laterality, unspecified site of breast (HCC)         Electronically signed by: Tha Townsend D.O., 7/4/2025 7:48 PM           [1]   Allergies  Allergen Reactions    Crestor [Rosuvastatin] Myalgia

## 2025-07-06 PROBLEM — Z01.818 PREOPERATIVE EXAMINATION: Status: RESOLVED | Noted: 2025-07-04 | Resolved: 2025-07-06

## 2025-07-06 ASSESSMENT — COGNITIVE AND FUNCTIONAL STATUS - GENERAL
DAILY ACTIVITIY SCORE: 24
SUGGESTED CMS G CODE MODIFIER MOBILITY: CH
SUGGESTED CMS G CODE MODIFIER DAILY ACTIVITY: CH
MOBILITY SCORE: 24

## 2025-07-06 ASSESSMENT — PAIN DESCRIPTION - PAIN TYPE: TYPE: ACUTE PAIN

## 2025-07-06 ASSESSMENT — FIBROSIS 4 INDEX: FIB4 SCORE: 1.41

## 2025-07-06 NOTE — PROGRESS NOTES
Brief ID note:    Chart reviewed. OR cultures are NGTD    -OK to discharge patient on linezolid 600 mg BID x 14 days  -Will continue to follow OR cultures and contact the patient post discharge if abx changes are necessary  -Repeat CBC with differential 1 week post discharge - hospitalist to order    Will arrange ID clinic follow up with NP in 1-2 weeks. Please place a referral to the ID clinic    Discussed with hospitalist, Dr. Mtz

## 2025-07-06 NOTE — CARE PLAN
The patient is Stable - Low risk of patient condition declining or worsening    Shift Goals  Clinical Goals: consults, pain management  Patient Goals: ambulate, rest  Family Goals: DHIRAJ    Progress made toward(s) clinical / shift goals:  patient seen by CELESTINO and MD. Patient requesting work note upon discharge. Patient ambulates in hallways frequently.       Problem: Knowledge Deficit - Standard  Goal: Patient and family/care givers will demonstrate understanding of plan of care, disease process/condition, diagnostic tests and medications  Outcome: Progressing     Problem: Pain - Standard  Goal: Alleviation of pain or a reduction in pain to the patient’s comfort goal  Outcome: Progressing     Problem: Infection - Standard  Goal: Patient will remain free from infection  Outcome: Progressing       Patient is not progressing towards the following goals:

## 2025-07-06 NOTE — HOSPITAL COURSE
Melva Sauceda has past medical history that includes breast cancer, she has a port in place.  On 7/5/2025, she underwent scheduled arthroscopic incision and drainage for suspected knee infection.  Patient was hospitalized postoperatively.  Infectious disease was consulted

## 2025-07-06 NOTE — PROGRESS NOTES
"Subjective:    No acute events.  Pain reasonably controlled.  Mobilizing well. No fevers or chills. No chest pain or shortness of breath     Objective:  /66   Pulse 60   Temp 36.4 °C (97.5 °F) (Temporal)   Resp 17   Ht 1.727 m (5' 7.99\")   Wt 78.8 kg (173 lb 11.6 oz)   SpO2 95%     Recent Labs     07/04/25 1950 07/05/25 0225   WBC 5.7 4.8   RBC 3.84* 3.94*   HEMOGLOBIN 12.4 12.4   HEMATOCRIT 37.1 37.8   MCV 96.6 95.9   MCH 32.3 31.5   MCHC 33.4 32.8   RDW 45.3 44.9   PLATELETCT 258 241   MPV 8.8* 8.4*     Recent Labs     07/04/25 1950 07/05/25 0225   SODIUM 143 140   POTASSIUM 3.9 4.2   CHLORIDE 109 108   CO2 20 22   GLUCOSE 88 88   BUN 21 20   CREATININE 0.71 0.65   CALCIUM 9.1 8.7         Intake/Output Summary (Last 24 hours) at 7/6/2025 0819  Last data filed at 7/5/2025 1800  Gross per 24 hour   Intake 1820 ml   Output --   Net 1820 ml       Comfortable, no distress  Neurologically and vascularly intact with palpable pedal pulses bilaterally.  Dressing intact with small amount of drainage noted to gauze that was dried blood, about a quarter sized patch      Assessment:  Doing well s/p.Left knee arthroscopic incision and drainage POD#1    Plan:    Diet as tolerated  WBAT  PT/OT  ID will follow cultures and adjust abx as needed  DVT ppx - ASA BID + Sequential Compression Devices  Discharge planning but cleared from ortho standpoint      "

## 2025-07-06 NOTE — CARE PLAN
The patient is Stable - Low risk of patient condition declining or worsening    Shift Goals  Clinical Goals: Pain management, monitor labs  Patient Goals: Comfort, rest  Family Goals: DHIRAJ    Progress made toward(s) clinical / shift goals:    Problem: Infection - Standard  Goal: Patient will remain free from infection  Outcome: Progressing  Note: Patient educated on s/s of infection. Patient verbalized understandings. Vitals performed Q4H and labs are stable.        Patient is not progressing towards the following goals:

## 2025-07-06 NOTE — DISCHARGE SUMMARY
Discharge Summary    CHIEF COMPLAINT ON ADMISSION  Chief Complaint   Patient presents with    Sent by MD     Was sent here by ortho to be admitted for clean out L knee  has infected joint         Reason for Admission  Sent by MD     Admission Date  7/4/2025    CODE STATUS  Full Code    HPI & HOSPITAL COURSE  This is a 59 y.o. female here with knee pain.     Melva Sauceda has past medical history that includes breast cancer, she has a port in place.      The patient underwent surgery on her left knee on 6/9/2025 admitted She underwent left knee arthroscopy with excision of the anterior soft tissue mass, partial lateral meniscectomy of the left knee, chondroplasty of patella, trochlea, medial lateral femoral condyles and partial synovectomy on 6/9/2025.  Pathology of soft tissue biopsy of the left knee revealed pigmented villonodular synovitis but negative for malignancy.  She did note some lateral knee swelling and erythema.  She subsequently patient synovial fluid analysis on 6/23 revealing 356 nucleated cells, monocyte predominant.  No crystals seen.  Joint panel PCR was negative.  However culture grew cutibacterium acnes.    On 7/4/2025 she was hospitalized after scheduled surgical intervention.  She underwent arthroscopy incision and drainage.  Due to the history of positive synovial fluid culture, infectious disease was consulted.  Patient was started on Zyvox    Patient seen and examined today.  I discussed case with infectious disease.    Patient is feeling well, she is ambulatory, pain is well-controlled, she is tolerating antibiotics    Patient can be discharged home today.  She will complete total of 14 days of Zyvox.  I have ordered a CBC to be drawn next week.  The patient will follow-up with [facility].  Referral has been placed    Therefore, she is discharged in good and stable condition to home with close outpatient follow-up.    The patient met 2-midnight criteria for an inpatient stay at  the time of discharge.    Discharge Date  7/6/2025    FOLLOW UP ITEMS POST DISCHARGE  Follow-up with orthopedics, follow-up with primary care, follow-up with infectious disease, follow-up for repeat CBC on 7/14/2025    DISCHARGE DIAGNOSES  Principal Problem:    Septic joint (HCC) (POA: Yes)  Active Problems:    Pure hypercholesterolemia (POA: Yes)    Primary osteoarthritis of both knees (POA: Yes)  Resolved Problems:    Preoperative examination (POA: Yes)      FOLLOW UP  Future Appointments   Date Time Provider Department Center   7/8/2025  2:15 PM Andry Quiroga M.D. [facility] [address]   1/5/2026  7:40 AM Uri Dumont P.A.-C. [facility]     No follow-up provider specified.    MEDICATIONS ON DISCHARGE     Medication List        START taking these medications        Instructions   linezolid 600 MG Tabs  Commonly known as: Zyvox   Take 1 Tablet by mouth every 12 hours.  Dose: 600 mg            CHANGE how you take these medications        Instructions   aspirin 81 MG EC tablet  What changed: when to take this   Take 1 Tablet by mouth 2 times a day for 14 days.  Dose: 81 mg            CONTINUE taking these medications        Instructions   anastrozole 1 MG Tabs  Commonly known as: Arimidex   Take 1 tablet by mouth every day     gabapentin 100 MG Caps  Commonly known as: Neurontin   Take 1 Capsule by mouth 3 times a day.  Dose: 100 mg     HERCEPTIN IV   Infuse 1 Each into a venous catheter every 21 days. At [facility] through port placed in January 2021  Dose: 1 Each     multivitamin Tabs   Take 1 Tablet by mouth every day.  Dose: 1 Tablet     PERJETA IV   Infuse 1 Each into a venous catheter every 21 days. At [facility] through port placed in January 2021  Dose: 1 Each     vitamin D3 125 MCG (5000 UT) Caps   Take 1 Capsule by mouth every day.  Dose: 1 Capsule            ASK your doctor about these medications        Instructions   acetaminophen 500 MG Tabs  Commonly known as: TYLENOL   Take  1-2 Tablets by mouth every 8 hours as needed for Mild Pain, Moderate Pain or Fever for up to 30 days. Not to exceed 3000 mg per day.  Dose: 500-1,000 mg     docusate sodium 100 MG Caps  Commonly known as: Colace   Take 1 Capsule by mouth 2 times a day as needed for Constipation for up to 30 days.  Dose: 100 mg              Allergies  Allergies[1]    DIET  Orders Placed This Encounter   Procedures    Diet Order Diet: Regular     Standing Status:   Standing     Number of Occurrences:   1     Diet::   Regular [1]       ACTIVITY  As tolerated.  Weight bearing as tolerated    CONSULTATIONS  Infectious disease    PROCEDURES  7/5/2025:     Procedure(s):  Left knee arthroscopic incision and drainage    LABORATORY  Lab Results   Component Value Date    SODIUM 140 07/05/2025    POTASSIUM 4.2 07/05/2025    CHLORIDE 108 07/05/2025    CO2 22 07/05/2025    GLUCOSE 88 07/05/2025    BUN 20 07/05/2025    CREATININE 0.65 07/05/2025        Lab Results   Component Value Date    WBC 4.8 07/05/2025    HEMOGLOBIN 12.4 07/05/2025    HEMATOCRIT 37.8 07/05/2025    PLATELETCT 241 07/05/2025        Total time of the discharge process exceeds 38 minutes.       [1]   Allergies  Allergen Reactions    Crestor [Rosuvastatin] Myalgia

## 2025-07-07 NOTE — Clinical Note
REFERRAL APPROVAL NOTICE         Sent on July 7, 2025                   Melva Sauceda  [address]  [place] [address]                   Dear Ms. Sauceda,    After a careful review of the medical information and benefit coverage, [facility] has processed your referral. See below for additional details.    If applicable, you must be actively enrolled with your insurance for coverage of the authorized service. If you have any questions regarding your coverage, please contact your insurance directly.    REFERRAL INFORMATION   Referral #:  [id number]  Referred-To Department    Referred-By Provider:  Infectious Diseases    Kristen Richardson M.D.   [facility]      [address]  [place] [address]  [phone] [address]  [place] [address]  [phone]    Referral Start Date:  07/05/2025  Referral End Date:   07/05/2026             SCHEDULING  If you do not already have an appointment, please call [phone] to make an appointment.     MORE INFORMATION  If you do not already have a [email or web address] account, sign up at: [email or web address]  You can access your medical information, make appointments, see lab results, billing information, and more.  If you have questions regarding this referral, please contact  the [facility] Referrals department at:             [phone]. Monday - Friday 8:00AM - 5:00PM.     Sincerely,    [facility]

## 2025-07-08 DIAGNOSIS — L28.1 PRURIGO NODULARIS: ICD-10-CM

## 2025-07-08 PROCEDURE — RXMED WILLOW AMBULATORY MEDICATION CHARGE: Performed by: NURSE PRACTITIONER

## 2025-07-08 RX ORDER — GABAPENTIN 100 MG/1
100 CAPSULE ORAL 3 TIMES DAILY
Qty: 90 CAPSULE | Refills: 1 | Status: SHIPPED | OUTPATIENT
Start: 2025-07-08

## 2025-07-08 NOTE — TELEPHONE ENCOUNTER
gabapentin (NEURONTIN) 100 MG Cap     Received request via: Pharmacy    Was the patient seen in the last year in this department? Yes    Does the patient have an active prescription (recently filled or refills available) for medication(s) requested? No    Pharmacy Name: [facility] [place]     Does the patient have [facility] and need 100-day supply? (This applies to ALL medications) Patient does not have SCP

## 2025-07-09 ENCOUNTER — PHYSICAL THERAPY (OUTPATIENT)
Dept: PHYSICAL THERAPY | Facility: REHABILITATION | Age: 60
End: 2025-07-09
Attending: STUDENT IN AN ORGANIZED HEALTH CARE EDUCATION/TRAINING PROGRAM
Payer: COMMERCIAL

## 2025-07-09 DIAGNOSIS — S83.232A COMPLEX TEAR OF MEDIAL MENISCUS OF LEFT KNEE AS CURRENT INJURY, INITIAL ENCOUNTER: Primary | ICD-10-CM

## 2025-07-09 PROCEDURE — 97110 THERAPEUTIC EXERCISES: CPT

## 2025-07-09 PROCEDURE — 97140 MANUAL THERAPY 1/> REGIONS: CPT

## 2025-07-09 PROCEDURE — 97014 ELECTRIC STIMULATION THERAPY: CPT

## 2025-07-09 NOTE — OP THERAPY DAILY TREATMENT
Outpatient Physical Therapy  DAILY TREATMENT     [facility] [address]  [address]  [place] [address]  Phone:  [phone]  Fax:  [phone]    Date: 07/09/2025    Patient: Melva Sauceda  YOB: 1965  MRN: [id number]     Time Calculation    Start time: 0833  Stop time: 0938 Time Calculation (min): 65 minutes         Chief Complaint: No chief complaint on file.    Visit #: 5    SUBJECTIVE:    Cultures came back + Cutibacterium and was admitted ARTHROSCOPIC I&D 7/5/25  OBJECTIVE:  -15 with 8 deg lag 90          Therapeutic Treatments and Modalities:     Therapeutic Treatment and Modalities Summary: Patellar mobs  Balloon bounce  Seateed nee flexion a/p tf joint mobs gd 2-3    Istm suprapatellar  capsule      Russian 10/10 ice to quads with balloon smash bounce x 5'  Insitiated partial revolution for aarom on bike--hep          Time-based treatments/modalities:    Physical Therapy Timed Treatment Charges  Manual therapy minutes (CPT 89908): 15 minutes  Therapeutic exercise minutes (CPT 00648): 25 minutes      Pain rating (1-10) before treatment:  0  Pain rating (1-10) after treatment:  tired, slight ache   -5  105deg w/o additional lag  ASSESSMENT:   Underwent arthroscopic I&D 7/5 and doing better today--loss of knee ext and noted quad inhibition but improved with treatment for quad control and increased knee motion      PLAN/RECOMMENDATIONS:   Progress end range quad recruitment,. Iasmt, cupping post. gastroc

## 2025-07-09 NOTE — DOCUMENTATION QUERY
[facility]                                                                       Query Response Note      PATIENT:               MARA ALDRICH  ACCT #:                  [id number]  MRN:                     [id number]  :                      1965  ADMIT DATE:       2025 6:51 PM  DISCH DATE:        2025 4:31 PM  RESPONDING  PROVIDER #:        [id number]           QUERY TEXT:    Please clarify in documentation the relationship, if any, between septic joint and left knee meniscus surgery done prior to admission     Note: If you agree with a diagnosis listed, please remember to include it in your concurrent daily documentation and onto the Discharge Summary    The patient's Clinical Indicators include:  - Findings:  H&P hospital medicine:   Had a recent left knee meniscus surgery who presented 2025 with suspected septic joint.  she was found to have bacteria growing on joint aspirate    - H&P orthopedic surgery:  1 month status post left knee arthroscopic medial meniscectomy with postoperative C acnes infection    - DC summary:  underwent left knee arthroscopy with excision of the anterior soft tissue mass, partial lateral meniscectomy of the left knee, chondroplasty of patella, trochlea, medial lateral  femoral condyles and partial synovectomy on 2025.   synovial fluid analysis on  revealing 356 nucleated cells, monocyte predominant.  No crystals seen.  Joint panel PCR was negative.  However culture grew cutibacterium acnes.     - Treatments:  antibiotics, ID consult, orthopedic surgery consult, cultures, incision and drainage     - Risk factors:  L knee arthroscopic surgery with medial meniscectomy     Thank You,  Aniyah Haque RN  Clinical Documentation   [email or web address]  Connect via [email or web address]  Options provided:   -- Septic joint is due to or associated with  left knee meniscus surgery done prior to  admission   -- Septic joint is not due to or associated with  left knee meniscus surgery done prior to admission   -- Other explanation, please specify_____   -- Unable to determine      Query created by: Aniyah Haque on 7/9/2025 9:40 AM    RESPONSE TEXT:    Septic joint is due to or associated with left knee meniscus surgery done prior to admission          Electronically signed by:  STEPHANIE CASTRO MD 7/9/2025 1:28 PM

## 2025-07-11 ENCOUNTER — PHYSICAL THERAPY (OUTPATIENT)
Dept: PHYSICAL THERAPY | Facility: REHABILITATION | Age: 60
End: 2025-07-11
Attending: STUDENT IN AN ORGANIZED HEALTH CARE EDUCATION/TRAINING PROGRAM
Payer: COMMERCIAL

## 2025-07-11 DIAGNOSIS — S83.232A COMPLEX TEAR OF MEDIAL MENISCUS OF LEFT KNEE AS CURRENT INJURY, INITIAL ENCOUNTER: Primary | ICD-10-CM

## 2025-07-11 PROCEDURE — 97014 ELECTRIC STIMULATION THERAPY: CPT

## 2025-07-11 PROCEDURE — 97110 THERAPEUTIC EXERCISES: CPT

## 2025-07-11 PROCEDURE — 97140 MANUAL THERAPY 1/> REGIONS: CPT

## 2025-07-11 NOTE — OP THERAPY DAILY TREATMENT
"  Outpatient Physical Therapy  DAILY TREATMENT     [facility] [address]  [address]  [place] [address]  Phone:  [phone]  Fax:  [phone]    Date: 07/11/2025    Patient: Melva Sauceda  YOB: 1965  MRN: [id number]     Time Calculation    Start time: 0930  Stop time: 1035 Time Calculation (min): 65 minutes         Chief Complaint: No chief complaint on file.    Visit #: 6    SUBJECTIVE:  A little more stiff and still  OBJECTIVE:  -15 with 10 deg lag 90    2\" distal to  pat 14' 5/8  Distal pat  -15 deg ext--15 3/4          Therapeutic Treatments and Modalities:     Therapeutic Treatment and Modalities Summary: Patellar mobs  Balloon bounce  Seated nee flexion a/p tf joint mobs gd 2-3    Istm : BF, VL suprapatellar pouch  Cupping with movement VL, BF  Russian VL, BF 10/10 ice to knee heat to quads with balloon smash bounce x 15'            Time-based treatments/modalities:    Physical Therapy Timed Treatment Charges  Manual therapy minutes (CPT 13601): 30 minutes  Therapeutic exercise minutes (CPT 76036): 10 minutes      Pain rating (1-10) before treatment:  0  Pain rating (1-10) after treatment:  tired, slight ache   -5  100deg w/o additional lag  ASSESSMENT:   More sore today with observationally more swelling but no temperature gradient or other sign off infection.  Cont. Limited quad recruitment with noted hyperesthesia to VL and BF region    PLAN/RECOMMENDATIONS:   Progress end range quad recruitment,. Iasmt, cupping post. gastroc         "

## 2025-07-14 ENCOUNTER — PHARMACY VISIT (OUTPATIENT)
Dept: PHARMACY | Facility: MEDICAL CENTER | Age: 60
End: 2025-07-14
Payer: COMMERCIAL

## 2025-07-15 ENCOUNTER — PHARMACY VISIT (OUTPATIENT)
Dept: PHARMACY | Facility: MEDICAL CENTER | Age: 60
End: 2025-07-15
Payer: COMMERCIAL

## 2025-07-15 ENCOUNTER — PHYSICAL THERAPY (OUTPATIENT)
Dept: PHYSICAL THERAPY | Facility: REHABILITATION | Age: 60
End: 2025-07-15
Attending: STUDENT IN AN ORGANIZED HEALTH CARE EDUCATION/TRAINING PROGRAM
Payer: COMMERCIAL

## 2025-07-15 DIAGNOSIS — S83.232A COMPLEX TEAR OF MEDIAL MENISCUS OF LEFT KNEE AS CURRENT INJURY, INITIAL ENCOUNTER: Primary | ICD-10-CM

## 2025-07-15 PROCEDURE — 97014 ELECTRIC STIMULATION THERAPY: CPT

## 2025-07-15 PROCEDURE — RXMED WILLOW AMBULATORY MEDICATION CHARGE: Performed by: INTERNAL MEDICINE

## 2025-07-15 PROCEDURE — 97110 THERAPEUTIC EXERCISES: CPT

## 2025-07-15 PROCEDURE — 97140 MANUAL THERAPY 1/> REGIONS: CPT

## 2025-07-15 RX ORDER — ANASTROZOLE 1 MG/1
TABLET ORAL
Qty: 90 TABLET | Refills: 1 | OUTPATIENT
Start: 2025-07-15

## 2025-07-15 NOTE — OP THERAPY DAILY TREATMENT
"  Outpatient Physical Therapy  DAILY TREATMENT     [facility] [address].  [address]  [place] [address]  Phone:  [phone]  Fax:  [phone]    Date: 07/15/2025    Patient: Melva Sauceda  YOB: 1965  MRN: [id number]     Time Calculation    Start time: 0200  Stop time: 0304 Time Calculation (min): 64 minutes         Chief Complaint: No chief complaint on file.    Visit #: 7    SUBJECTIVE:  A little more stiff and still  OBJECTIVE:  -10  deg lag 110    2\" distal to  pat 14' 1/2  Distal pat  -15 deg ext--15 \"          Therapeutic Treatments and Modalities:     Therapeutic Treatment and Modalities Summary: Fib mobs in flexion at 90 and ext -5-10  Stm to ant tib attachment  Cupping ant tib and prox fib head  Balloon bounce--focussed on quad recruitment  Seated knee flexion a/p tf joint mobs gd 2-3    Istm : BF, VL suprapatellar pouch  Cupping with movement VL, BF  Russian VL, BF 10/10 ice to knee heat to quads with balloon smash bounce x 15'            Time-based treatments/modalities:    Physical Therapy Timed Treatment Charges  Manual therapy minutes (CPT 99410): 30 minutes  Therapeutic exercise minutes (CPT 84863): 8 minutes      Pain rating (1-10) before treatment:  0  Pain rating (1-10) after treatment:  0   0 w/ 5 deg lag   115 deg  ASSESSMENT:   Significant decrease in swelling about knee joint w/ noted ttp fib head--increased rom but still presented with ext lag    PLAN/RECOMMENDATIONS:   Progress end range quad recruitment,. Iatm, cupping post. gastroc         "

## 2025-07-17 ENCOUNTER — PHYSICAL THERAPY (OUTPATIENT)
Dept: PHYSICAL THERAPY | Facility: REHABILITATION | Age: 60
End: 2025-07-17
Attending: STUDENT IN AN ORGANIZED HEALTH CARE EDUCATION/TRAINING PROGRAM
Payer: COMMERCIAL

## 2025-07-17 ENCOUNTER — HOSPITAL ENCOUNTER (OUTPATIENT)
Dept: RADIOLOGY | Facility: MEDICAL CENTER | Age: 60
End: 2025-07-17
Attending: PHYSICIAN ASSISTANT
Payer: COMMERCIAL

## 2025-07-17 DIAGNOSIS — Z12.31 ENCOUNTER FOR SCREENING MAMMOGRAM FOR BREAST CANCER: ICD-10-CM

## 2025-07-17 DIAGNOSIS — S83.232A COMPLEX TEAR OF MEDIAL MENISCUS OF LEFT KNEE AS CURRENT INJURY, INITIAL ENCOUNTER: Primary | ICD-10-CM

## 2025-07-17 PROCEDURE — 97014 ELECTRIC STIMULATION THERAPY: CPT

## 2025-07-17 PROCEDURE — 97110 THERAPEUTIC EXERCISES: CPT

## 2025-07-17 PROCEDURE — 77063 BREAST TOMOSYNTHESIS BI: CPT

## 2025-07-17 PROCEDURE — 97140 MANUAL THERAPY 1/> REGIONS: CPT

## 2025-07-17 NOTE — OP THERAPY DAILY TREATMENT
"  Outpatient Physical Therapy  DAILY TREATMENT     [facility] [address].  [address]  [place] [address]  Phone:  [phone]  Fax:  [phone]    Date: 07/17/2025    Patient: Melva Sauceda  YOB: 1965  MRN: [id number]     Time Calculation    Start time: 0200  Stop time: 0310 Time Calculation (min): 70 minutes         Chief Complaint: No chief complaint on file.    Visit #: 8    SUBJECTIVE:  Less pain more motion but still limited with sit-stand still hurts  OBJECTIVE:  -10  deg lag 110    2\" distal to  pat 14' 1/2  Distal pat  -15 deg ext--15 \"          Therapeutic Treatments and Modalities:     Therapeutic Treatment and Modalities Summary: Fib mobs in flexion at 90 and ext -5-10  Stm to ant tib attachment, BF , medial gastroc head  Cupping ant tib and prox fib head  Balloon bounce--focussed on quad recruitment  Seated knee flexion a/p tf joint mobs gd 2-3  Seated hamstring stretch with nerve glides--hep  Supine active HSS--hep  Change seated position w/ less hs contact by chair  Istm : BF, lateral gastroc  Cupping with movement BF and lateral gastroc    Russian VL, BF  10/10 ball curl x 5' then 5//5 mhp  x 10'            Time-based treatments/modalities:    Physical Therapy Timed Treatment Charges  Manual therapy minutes (CPT 18993): 30 minutes  Therapeutic exercise minutes (CPT 27910): 8 minutes      Pain rating (1-10) before treatment:  0  Pain rating (1-10) after treatment:  0   0 w/ 5 deg lag   120 deg  ASSESSMENT:   Noted significant guarding hamstring with BF trigger point an decreased rom after treatment--cont. To struggle with end range quad recruitment    PLAN/RECOMMENDATIONS:   Progress end range quad recruitment,. Iatm,ART, cupping post. Gastroc and hamstring          "

## 2025-07-23 ASSESSMENT — ENCOUNTER SYMPTOMS
HEADACHES: 0
SPUTUM PRODUCTION: 0
BRUISES/BLEEDS EASILY: 0
PALPITATIONS: 0
COUGH: 0
NERVOUS/ANXIOUS: 0
BLURRED VISION: 0
FEVER: 0
FOCAL WEAKNESS: 0
CHILLS: 0
WEIGHT LOSS: 0
DIZZINESS: 0
ABDOMINAL PAIN: 0
DOUBLE VISION: 0
VOMITING: 0
NAUSEA: 0
WHEEZING: 0
SHORTNESS OF BREATH: 0
MYALGIAS: 0

## 2025-07-24 ENCOUNTER — OFFICE VISIT (OUTPATIENT)
Dept: INFECTIOUS DISEASES | Facility: MEDICAL CENTER | Age: 60
End: 2025-07-24
Attending: NURSE PRACTITIONER
Payer: COMMERCIAL

## 2025-07-24 VITALS
BODY MASS INDEX: 25.72 KG/M2 | RESPIRATION RATE: 17 BRPM | DIASTOLIC BLOOD PRESSURE: 68 MMHG | OXYGEN SATURATION: 98 % | HEIGHT: 68 IN | HEART RATE: 68 BPM | TEMPERATURE: 97.8 F | SYSTOLIC BLOOD PRESSURE: 124 MMHG | WEIGHT: 169.7 LBS

## 2025-07-24 DIAGNOSIS — A49.8 INFECTION DUE TO CUTIBACTERIUM SPECIES: ICD-10-CM

## 2025-07-24 DIAGNOSIS — M00.9 PYOGENIC ARTHRITIS OF LEFT KNEE JOINT, DUE TO UNSPECIFIED ORGANISM (HCC): ICD-10-CM

## 2025-07-24 DIAGNOSIS — Z98.890 HISTORY OF RIGHT BREAST BIOPSY: Primary | ICD-10-CM

## 2025-07-24 PROCEDURE — 3078F DIAST BP <80 MM HG: CPT | Performed by: NURSE PRACTITIONER

## 2025-07-24 PROCEDURE — 99214 OFFICE O/P EST MOD 30 MIN: CPT | Performed by: NURSE PRACTITIONER

## 2025-07-24 PROCEDURE — 3074F SYST BP LT 130 MM HG: CPT | Performed by: NURSE PRACTITIONER

## 2025-07-24 ASSESSMENT — ENCOUNTER SYMPTOMS
CONSTIPATION: 0
DIARRHEA: 0

## 2025-07-24 ASSESSMENT — FIBROSIS 4 INDEX: FIB4 SCORE: 1.41

## 2025-07-24 NOTE — PROGRESS NOTES
INFECTIOUS  DISEASE  OUTPATIENT CLINIC  NOTE   Subjective   Primary care provider: Uri Dumont P.A.-C..     Reason for Follow Up:   Follow-up for   1. History of right breast biopsy        2. 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 Pyogenic arthritis of left knee joint, due to unspecified organism (HCC)          HPI: Patient previously seen and treated by ID team as inpatient during hospital admission.   Melva Sauceda is a 59 y.o. woman with a history of stage IV breast cancer status post cardiotoxic chemotherapy in remission on aromatase inhibitors, and recent left knee meniscus surgery on 6/9/2025 admitted 7/4/2025 for planned surgical intervention.   Patient noted a soft tissue mass in the left knee in June 2025.  She underwent left knee arthroscopy with excision of the anterior soft tissue mass, partial lateral meniscectomy of the left knee, chondroplasty of patella, trochlea, medial lateral femoral condyles and partial synovectomy on 6/9/2025.  Pathology of soft tissue biopsy of the left knee revealed pigmented villonodular synovitis but negative for malignancy.  She did note some lateral knee swelling and erythema.  She subsequently patient synovial fluid analysis on 6/23 revealing 356 nucleated cells, monocyte predominant.  No crystals seen.  Joint panel PCR was negative. She was prescribed a 10-day course of Bactrim and Keflex by orthopedic surgeon.   However culture grew cutibacterium acnes. When the cultures returned positive for cutibacterium's, her surgeon contacted her for surgical intervention.  7/5/2025 s/p I&D. Per the procedure note, there was no clear abscesses or extension of any potentially infectious joint fluid or tissue was found.  However there was 45 cc of some slightly cloudy synovial fluid. OR cultures are NGTD. Discharge patient on linezolid 600 mg BID x 14 days     07/24/25- Today Patient reports feeling well.  Completed 14-day course of Zyvox on 7/17/2025.  Reports that  she tolerated Zyvox well with only complaint of GERD near the end of her 14-day course.  Pt stating that the wound is healing well.  Denies feeling generally ill, fevers/chills, general malaise, headache, n/v/d.  X 2 surgical sites healing appropriately with no surrounding erythema, swelling or drainage.  Very mild warmth compared to right knee.  No signs of active infection.  Most recent labs reviewed from 7/5/2025 WBC 4.8, platelet count 241, CMP unremarkable.  We discussed signs and symptoms of recurrence of infection and when to report to ER/call 911.    Current Antimicrobials:   Previous Antimicrobials:    Other Current Medications:  Home Medications   Medication Sig Taking? Last Dose Authorizing Provider   anastrozole (ARIMIDEX) 1 MG Tab Take 1 tablet by mouth every day Yes  Carlos Loza M.D.   gabapentin (NEURONTIN) 100 MG Cap Take 1 Capsule by mouth 3 times a day. Yes  LISANDRO Teran   multivitamin Tab Take 1 Tablet by mouth every day. Yes  Physician Outpatient   Cholecalciferol (VITAMIN D3) 125 MCG (5000 UT) Cap Take 1 Capsule by mouth every day. Yes  Physician Outpatient   Trastuzumab (HERCEPTIN IV) Infuse 1 Each into a venous catheter every 21 days. At [facility] through port placed in January 2021 Yes  Physician Outpatient   Pertuzumab (PERJETA IV) Infuse 1 Each into a venous catheter every 21 days. At [facility] through port placed in January 2021 Yes  Physician Outpatient        PMH:  Past Medical History[1]  Past Surgical History[2]  Family History   Problem Relation Age of Onset    Cancer Mother         breast and bladder    Hypertension Mother     Arrythmia Mother     Heart Failure Father      Social History     Socioeconomic History    Marital status: Single     Spouse name: Not on file    Number of children: Not on file    Years of education: Not on file    Highest education level: Associate degree: academic program   Occupational History    Not on file   Tobacco Use     Smoking status: Never    Smokeless tobacco: Never   Vaping Use    Vaping status: Never Used   Substance and Sexual Activity    Alcohol use: Yes     Alcohol/week: 3.6 - 4.2 oz     Types: 6 - 7 Standard drinks or equivalent per week    Drug use: Never    Sexual activity: Not Currently     Comment: 3 children, no grands   Other Topics Concern    Not on file   Social History Narrative    Not on file     Social Drivers of Health     Financial Resource Strain: Low Risk  (9/17/2024)    Overall Financial Resource Strain (CARDIA)     Difficulty of Paying Living Expenses: Not very hard   Food Insecurity: No Food Insecurity (7/4/2025)    Hunger Vital Sign     Worried About Running Out of Food in the Last Year: Never true     Ran Out of Food in the Last Year: Never true   Transportation Needs: No Transportation Needs (7/4/2025)    PRAPARE - Transportation     Lack of Transportation (Medical): No     Lack of Transportation (Non-Medical): No   Physical Activity: Sufficiently Active (9/17/2024)    Exercise Vital Sign     Days of Exercise per Week: 5 days     Minutes of Exercise per Session: 60 min   Stress: No Stress Concern Present (9/17/2024)    [employer] - Occupational Stress Questionnaire     Feeling of Stress : Not at all   Social Connections: Moderately Integrated (9/17/2024)    Social Connection and Isolation Panel [NHANES]     Frequency of Communication with Friends and Family: More than three times a week     Frequency of Social Gatherings with Friends and Family: Once a week     Attends Rastafari Services: 1 to 4 times per year     Active Member of Clubs or Organizations: No     Attends Club or Organization Meetings: 1 to 4 times per year     Marital Status:    Intimate Partner Violence: Not At Risk (7/4/2025)    Humiliation, Afraid, Rape, and Kick questionnaire     Fear of Current or Ex-Partner: No     Emotionally Abused: No     Physically Abused: No     Sexually Abused: No  "  Housing Stability: Low Risk  (7/4/2025)    Housing Stability Vital Sign     Unable to Pay for Housing in the Last Year: No     Number of Times Moved in the Last Year: 0     Homeless in the Last Year: No           Allergies/Intolerances:  Allergies[3]    ROS:   Review of Systems   Constitutional:  Negative for chills, fever, malaise/fatigue and weight loss.   HENT:  Negative for congestion and hearing loss.    Eyes:  Negative for blurred vision and double vision.   Respiratory:  Negative for cough, sputum production, shortness of breath and wheezing.    Cardiovascular:  Negative for chest pain and palpitations.   Gastrointestinal:  Negative for abdominal pain, constipation, diarrhea, nausea and vomiting.   Genitourinary:  Negative for dysuria.   Musculoskeletal:  Positive for joint pain (Bilateral knees). Negative for myalgias.   Skin:  Negative for itching and rash.   Neurological:  Negative for dizziness, focal weakness and headaches.   Endo/Heme/Allergies:  Does not bruise/bleed easily.   Psychiatric/Behavioral:  The patient is not nervous/anxious.       ROS was reviewed and were negative except as above.    Objective    Most Recent Vital Signs:  /68   Pulse 68   Temp 36.6 °C (97.8 °F) (Temporal)   Resp 17   Ht 1.727 m (5' 8\")   Wt 77 kg (169 lb 11.2 oz)   SpO2 98%   BMI 25.80 kg/m²     Physical Exam:  Physical Exam  Vitals and nursing note reviewed.   Constitutional:       General: She is not in acute distress.     Appearance: Normal appearance. She is not ill-appearing.   HENT:      Head: Normocephalic and atraumatic.      Nose: Nose normal.      Mouth/Throat:      Mouth: Mucous membranes are moist.   Eyes:      Pupils: Pupils are equal, round, and reactive to light.   Musculoskeletal:      Cervical back: Normal range of motion.      Right lower leg: No edema.      Left lower leg: No edema.      Comments: X 2 surgical wounds healing appropriately.  No surrounding erythema, swelling or drainage.  " Slight warmth in left leg compared to right   Skin:     General: Skin is warm and dry.      Coloration: Skin is not jaundiced or pale.   Neurological:      General: No focal deficit present.      Mental Status: She is alert and oriented to person, place, and time.   Psychiatric:         Mood and Affect: Mood normal.         Behavior: Behavior normal.          Pertinent Lab/Imaging Results:  [unfilled]  @CMP@  WBC   Date/Time Value Ref Range Status   07/05/2025 02:25 AM 4.8 4.8 - 10.8 K/uL Final     RBC   Date/Time Value Ref Range Status   07/05/2025 02:25 AM 3.94 (L) 4.20 - 5.40 M/uL Final     Hemoglobin   Date/Time Value Ref Range Status   07/05/2025 02:25 AM 12.4 12.0 - 16.0 g/dL Final     Hematocrit   Date/Time Value Ref Range Status   07/05/2025 02:25 AM 37.8 37.0 - 47.0 % Final     MCV   Date/Time Value Ref Range Status   07/05/2025 02:25 AM 95.9 81.4 - 97.8 fL Final     MCH   Date/Time Value Ref Range Status   07/05/2025 02:25 AM 31.5 27.0 - 33.0 pg Final     MCHC   Date/Time Value Ref Range Status   07/05/2025 02:25 AM 32.8 32.2 - 35.5 g/dL Final     MPV   Date/Time Value Ref Range Status   07/05/2025 02:25 AM 8.4 (L) 9.0 - 12.9 fL Final      Sodium   Date/Time Value Ref Range Status   07/05/2025 02:25  135 - 145 mmol/L Final     Potassium   Date/Time Value Ref Range Status   07/05/2025 02:25 AM 4.2 3.6 - 5.5 mmol/L Final     Chloride   Date/Time Value Ref Range Status   07/05/2025 02:25  96 - 112 mmol/L Final     Co2   Date/Time Value Ref Range Status   07/05/2025 02:25 AM 22 20 - 33 mmol/L Final     Glucose   Date/Time Value Ref Range Status   07/05/2025 02:25 AM 88 65 - 99 mg/dL Final     Bun   Date/Time Value Ref Range Status   07/05/2025 02:25 AM 20 8 - 22 mg/dL Final     Creatinine   Date/Time Value Ref Range Status   07/05/2025 02:25 AM 0.65 0.50 - 1.40 mg/dL Final     Alkaline Phosphatase   Date/Time Value Ref Range Status   07/05/2025 02:25 AM 58 30 - 99 U/L Final     AST(SGOT)   Date/Time  "Value Ref Range Status   07/05/2025 02:25 AM 22 12 - 45 U/L Final     ALT(SGPT)   Date/Time Value Ref Range Status   07/05/2025 02:25 AM 15 2 - 50 U/L Final     Total Bilirubin   Date/Time Value Ref Range Status   07/05/2025 02:25 AM 0.3 0.1 - 1.5 mg/dL Final      No results found for: \"CPKTOTAL\"       No results found for: \"BLOODCULTU\", \"BLDCULT\", \"BCHOLD\"    No results found for: \"BLOODCULTU\", \"BLDCULT\", \"BCHOLD\"          Latest Reference Range & Units 06/23/25 12:17 07/04/25 19:50 07/04/25 20:05 07/05/25 08:02 07/05/25 14:12   Anaerococcus prevotii/vaginalis, PCR  Not Detected       Bacteroides fragilis, PCR  Not Detected       Candida albicans, PCR  Not Detected       Candida spp, PCR  Not Detected       IMP (Carbapenemase), PCR  N/A       KPC (Carbapenemase), PCR  N/A       NDM (Carbapenemase), PCR  N/A       Oxa-48-like (Carbapenemase), PCR  N/A       VIM (Carbapenemase), PCR  N/A       CTX-M (ESBL), PCR  N/A       mecA/C and MREJ (MRSA), PCR  N/A       Connor/B (Vancomycin Resistance), PCR  N/A       Citrobacter, PCR  Not Detected       Clostridium perfringens, PCR  Not Detected       Cutibacterium avidum/granulosum, PCR  Not Detected       Enterobacter cloacae complex, PCR  Not Detected       Enterococcus faecalis, PCR  Not Detected       Enterococcus faecium, PCR  Not Detected       Escherichia coli, PCR  Not Detected       Finegoldia Magna, PCR  Not Detected       Haemophilus influenzae, PCR  Not Detected       Kingella kingae, PCR  Not Detected       Klebsiella aerogenes, PCR  Not Detected       Klebsiella pneumoniae group, PCR  Not Detected       Morganella morganii, PCR  Not Detected       MRSA by PCR Negative      Negative   Neisseria gonorrhoeae, PCR  Not Detected       Parvimonas Micra, PCR  Not Detected       Peptoniphilus, PCR  Not Detected       Peptostreptococcus anaerobius, PCR  Not Detected       Proteus spp, PCR  Not Detected       Pseudomonas aeruginosa, PCR  Not Detected       Salmonella spp, " PCR  Not Detected       Serratia marcescens, PCR  Not Detected       Significant Indicator  NEG  POS !  . NEG NEG NEG  NEG  .    Site  left knee  left knee  left knee port PERIPHERAL Left Knee  Left Knee  Left Knee    Source  SYNO  SYNO  SYNO BLD BLD SYNO  SYNO  SYNO    Staphylococcus aureus, PCR  Not Detected       Staphylococcus lugdunensis, PCR  Not Detected       Streptococcus spp, PCR  Not Detected       Streptococcus pneumoniae, PCR  Not Detected       Streptococcus agalactiae, PCR  Not Detected       Streptococcus pyogenes, PCR  Not Detected       !: Data is abnormal  Anaerobic Culture  Order: [id number] - Reflex for Order [id number]   Status: Final result       Next appt: 08/05/2025 at 08:00 AM in Physical Therapy (Perez Stone, PT, DPT, OCS)    Test Result Released: Yes (seen)    Specimen Information: Synovial   0 Result Notes       View Follow-Up Encounter      Component  Ref Range & Units (hover) 1 mo ago   Significant Indicator POS Positive (POS)   Source SYNO   Site left knee   Culture Result - Abnormal    Culture Result  Abnormal   Cutibacterium (Propionibacterium) acnes  Isolated from enrichment broth only, please correlate with  clinical condition.    Resulting Agency M             Specimen Collected: 06/23/25 12:17 PM Last Resulted: 07/05/25  1:11 PM     Impression/Assessment      1. History of right breast biopsy        2. 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 Pyogenic arthritis of left knee joint, due to unspecified organism (HCC)          Melva Sauceda is a 59 y.o. woman with a history of stage IV breast cancer status post cardiotoxic chemotherapy in remission on aromatase inhibitors, and recent left knee meniscus surgery on 6/9/2025 admitted 7/4/2025 for planned surgical intervention.   Patient noted a soft tissue mass in the left knee in June 2025.  She underwent left knee arthroscopy with excision of the anterior soft tissue mass, partial lateral meniscectomy of the left knee,  chondroplasty of patella, trochlea, medial lateral femoral condyles and partial synovectomy on 6/9/2025.  Pathology of soft tissue biopsy of the left knee revealed pigmented villonodular synovitis but negative for malignancy.  She did note some lateral knee swelling and erythema.  She subsequently patient synovial fluid analysis on 6/23 revealing 356 nucleated cells, monocyte predominant.  No crystals seen.  Joint panel PCR was negative. She was prescribed a 10-day course of Bactrim and Keflex by orthopedic surgeon.   However culture grew cutibacterium acnes. When the cultures returned positive for cutibacterium's, her surgeon contacted her for surgical intervention.  7/5/2025 s/p I&D. Per the procedure note, there was no clear abscesses or extension of any potentially infectious joint fluid or tissue was found.  However there was 45 cc of some slightly cloudy synovial fluid. OR cultures are NGTD. Discharge patient on linezolid 600 mg BID x 14 days     07/24/25- Today Patient reports feeling well.  Completed 14-day course of Zyvox on 7/17/2025.  Reports that she tolerated Zyvox well with only complaint of GERD near the end of her 14-day course.  Pt stating that the wound is healing well.  Denies feeling generally ill, fevers/chills, general malaise, headache, n/v/d.  X 2 surgical sites healing appropriately with no surrounding erythema, swelling or drainage.  Very mild warmth compared to right knee.  No signs of active infection.  Most recent labs reviewed from 7/5/2025 WBC 4.8, platelet count 241, CMP unremarkable.  We discussed signs and symptoms of recurrence of infection and when to report to ER/call 911.      - This infection/ chronic illness posses a threat to life, bodily function, and limb preservation   PLAN:   - Completed 14-day course of Zyvox on 7/17/2025.  No signs or symptoms of recurrence of infection while off antibiotic therapy.  No further need for antibiotic therapy.  - Recommend routine follow up  with PCP/oncology/surgery  - Continue wound care to left knee  - Education provided on signs and symptoms of recurrent infection and wound report to ER/call 911      Return visit: PRN. Follow up with primary care physician for chronic medical problems    I have performed a physical exam,  updated ROS and plan today. I have reviewed previous images, labs, and provider notes.      BENEDICT Jo.    All Patients should seek medical re-evaluation or report to the ER for new, increasing or worsening symptoms. In some circumstances medical conditions can change from the initial evaluation and may require emergent medical re-evaluation. This includes but is not limited to chest pain, shortness of breath, atypical abdominal pain, atypical headache, ALOC, fever >101, low blood pressure, high respiratory rate (above 30), low oxygen saturation (below 90%), acute delirium, abnormal bleeding, inability to tolerate any intake, weakness on one side of the body, any worsened or concerning conditions.    Please note that this dictation was created using voice recognition software. I have worked with technical experts from [employer] to optimize the interface.  I have made every reasonable attempt to correct obvious errors, but there may be errors of grammar and possibly content that I did not discover before finalizing the note.         [1]   Past Medical History:  Diagnosis Date    Cancer (HCC) 12/2021    Breast cancer - left    Cataract     H/O OU    COVID-19 06/11/2022    Detached retina, right     H/O    PONV (postoperative nausea and vomiting)     Prediabetes    [2]   Past Surgical History:  Procedure Laterality Date    PB KNEE SCOPE,DIAGNOSTIC Left 7/5/2025    Procedure: ARTHROSCOPY, KNEE i and d;  Surgeon: Shiva Deleon M.D.;  Location: SURGERY [facility];  Service: Orthopedics    PB KNEE SCOPE,FULL SYNOVECT Left 6/9/2025    Procedure: LEFT KNEE ARTHROSCOPY, LEFT SOFT TISSUE MASS EXCISION, SYNOVECTOMY, LEFT  PARTIAL MEDIAL MENISCECTOMY, CHONDROPLASTY, REPAIRS AS INDICATED;  Surgeon: Andry Quiroga M.D.;  Location: [facility];  Service: Orthopedics    PB KNEE SCOPE,MED OR LAT MENIS REPAIR Left 2025    Procedure: LEFT KNEE ARTHROSCOPY, LEFT SOFT TISSUE MASS EXCISION, SYNOVECTOMY, LEFT PARTIAL MEDIAL MENISCECTOMY, CHONDROPLASTY, REPAIRS AS INDICATED;  Surgeon: Andry Quiroga M.D.;  Location: [facility];  Service: Orthopedics    PB MASTECTOMY, PARTIAL Left 2022    Procedure: LEFT ELIAN  LOCALIZED PARTIAL MASTECTOMY, LEFT SENTINEL NODE BIOPSY, POSSIBLE AXILLARY DISSECTION, BIOABSORBABLE FIDUCIAL MARKER PLACEMENT;  Surgeon: Shane Jorge M.D.;  Location: SURGERY [facility];  Service: General    NODE BIOPSY SENTINEL Left 2022    Procedure: BIOPSY, LYMPH NODE, SENTINEL;  Surgeon: Shane Jorge M.D.;  Location: [facility];  Service: General    AXILLARY NODE DISSECTION Left 2022    Procedure: LYMPHADENECTOMY,  AXILLARY;  Surgeon: Shane Jorge M.D.;  Location: SURGERY [facility];  Service: General    MA ULTRASONIC GUIDANCE, INTRAOPERATIVE Right 2021    Procedure: ULTRASOUND GUIDANCE;  Surgeon: Shane Jorge M.D.;  Location: SURGERY [facility];  Service: General    CATH PLACEMENT Right 2021    Procedure: INSERTION, CATHETER - PORT A;  Surgeon: Shane Jorge M.D.;  Location: SURGERY [facility];  Service: General    RETINAL DETACHMENT REPAIR Right     BUNIONECTOMY Right     CATARACT EXTRACTION WITH IOL Left     GYN SURGERY           LUMPECTOMY Bilateral     >20 years    OTHER      Cataract Extraction OD   [3]   Allergies  Allergen Reactions    Crestor [Rosuvastatin] Myalgia

## 2025-08-05 ENCOUNTER — PHYSICAL THERAPY (OUTPATIENT)
Dept: PHYSICAL THERAPY | Facility: REHABILITATION | Age: 60
End: 2025-08-05
Attending: STUDENT IN AN ORGANIZED HEALTH CARE EDUCATION/TRAINING PROGRAM
Payer: COMMERCIAL

## 2025-08-05 DIAGNOSIS — S83.232A COMPLEX TEAR OF MEDIAL MENISCUS OF LEFT KNEE AS CURRENT INJURY, INITIAL ENCOUNTER: Primary | ICD-10-CM

## 2025-08-05 PROCEDURE — 97140 MANUAL THERAPY 1/> REGIONS: CPT

## 2025-08-05 PROCEDURE — 97014 ELECTRIC STIMULATION THERAPY: CPT

## 2025-08-05 PROCEDURE — 97110 THERAPEUTIC EXERCISES: CPT

## 2025-08-07 ENCOUNTER — PHYSICAL THERAPY (OUTPATIENT)
Dept: PHYSICAL THERAPY | Facility: REHABILITATION | Age: 60
End: 2025-08-07
Attending: STUDENT IN AN ORGANIZED HEALTH CARE EDUCATION/TRAINING PROGRAM
Payer: COMMERCIAL

## 2025-08-07 DIAGNOSIS — S83.232A COMPLEX TEAR OF MEDIAL MENISCUS OF LEFT KNEE AS CURRENT INJURY, INITIAL ENCOUNTER: Primary | ICD-10-CM

## 2025-08-07 DIAGNOSIS — R26.2 DIFFICULTY IN WALKING: ICD-10-CM

## 2025-08-07 PROCEDURE — 97140 MANUAL THERAPY 1/> REGIONS: CPT

## 2025-08-07 PROCEDURE — 97014 ELECTRIC STIMULATION THERAPY: CPT

## 2025-08-07 PROCEDURE — 97110 THERAPEUTIC EXERCISES: CPT

## 2025-08-08 NOTE — DOCUMENTATION QUERY
[facility]                                                                       Query Response Note      PATIENT:               MARA ALDRICH  ACCT #:                  [id number]  MRN:                     [id number]  :                      1965  ADMIT DATE:       2025 6:51 PM  DISCH DATE:        2025 4:31 PM  RESPONDING  PROVIDER #:        [id number]           QUERY TEXT:    Debridement is documented in the Medical Record. Please specify the type.      Note: If you agree with a diagnosis listed, please remember to include it in your concurrent daily documentation and onto the Discharge Summary    The patient's Clinical Indicators include:  - Findings:  OP report: procedure:  Left knee arthroscopic incision and drainage  detail:   We then further debrided some of the hemosiderin stained tissue and some of the mildly devitalized looking tissue we also  cleaned up and debrided areas of chondromalacia to stable borders.    - Treatments:  L knee arthroscopic incision and drainage, Orthopedic surgery consult, ID consult, antibiotics, cultures    - Risk factors:  septic joint, recent L knee meniscus surgery    Thank You,  Aniyah Haque RN  Clinical Documentation   [email or web address]  Connect via [email or web address]  Options provided:   -- Excisional debridement, Please specify the following details- Deepest level of debridement treated, instrument used, nature of tissue, appearance/size of wound, and technique   -- Non-excisional debridement   -- Other explanation, Please specify other explanation      Query created by: Aniyah Haque on 2025 4:27 PM    RESPONSE TEXT:    excisional debridement-          Electronically signed by:  TIFFANY PRICE MD 2025 12:03 PM

## (undated) DEVICE — PAD LAP STERILE 18 X 18 - (5/PK 40PK/CA)

## (undated) DEVICE — SLEEVE, VASO, THIGH, MED

## (undated) DEVICE — CLIP MED INTNL HRZN TI ESCP - (25/BX)

## (undated) DEVICE — MASK ANESTHESIA ADULT  - (100/CA)

## (undated) DEVICE — CANISTER SUCTION 3000ML MECHANICAL FILTER AUTO SHUTOFF MEDI-VAC NONSTERILE LF DISP  (40EA/CA)

## (undated) DEVICE — SUCTION INSTRUMENT YANKAUER BULBOUS TIP W/O VENT (50EA/CA)

## (undated) DEVICE — SUTURE 4-0 MONOCRYL PLUS PS-2 - 27 INCH (36/BX)

## (undated) DEVICE — PACK MAJOR BASIN - (2EA/CA)

## (undated) DEVICE — SLEEVE VASO CALF MED - (10PR/CA)

## (undated) DEVICE — HEAD HOLDER JUNIOR/ADULT

## (undated) DEVICE — SUTURE 2-0 COATED VICRYL PLUS - 12 X 18 INCH (12/BX)

## (undated) DEVICE — SYRINGE 10 ML CONTROL LL (25EA/BX 4BX/CA)

## (undated) DEVICE — GOWN SURGICAL X-LARGE ULTRA - FILM-REINFORCED (20/CA)

## (undated) DEVICE — BLADE SURGICAL #10 - (50/BX)

## (undated) DEVICE — BLADE SURGICAL #15 - (50/BX 3BX/CA)

## (undated) DEVICE — GOWN WARMING STANDARD FLEX - (30/CA)

## (undated) DEVICE — SHEET TRANSVERSE LAP - (12EA/CA)

## (undated) DEVICE — SUTURE 3-0 VICRYL PLUS SH - 8X 18 INCH (12/BX)

## (undated) DEVICE — DRESSING TRANSPARENT FILM TEGADERM 4 X 4.75" (50EA/BX)"

## (undated) DEVICE — CATHETER POWERPORT CLEARVUE MRI 8FR ATTACHABLE CHRONOFLEX: Type: IMPLANTABLE DEVICE | Status: NON-FUNCTIONAL

## (undated) DEVICE — CLIP LG INTNL HRZN TI ESCP LGT - (20/BX)

## (undated) DEVICE — LACTATED RINGERS INJ 1000 ML - (14EA/CA 60CA/PF)

## (undated) DEVICE — DEVICE CLOSURE KIT VISTASEAL 4ML (1EA/BX)

## (undated) DEVICE — COVER PROBE INTRAOPERATIVE KIT (10EA/CA)

## (undated) DEVICE — STERI STRIP COMPOUND BENZOIN - TINCTURE 0.6ML WITH APPLICATOR (40EA/BX)

## (undated) DEVICE — TOWELS CLOTH SURGICAL - (4/PK 20PK/CA)

## (undated) DEVICE — SET LEADWIRE 5 LEAD BEDSIDE DISPOSABLE ECG (1SET OF 5/EA)

## (undated) DEVICE — SUTURE 4-0 MONOCRYL PLUSPC-3 - 18 INCH (12/BX)

## (undated) DEVICE — SET EXTENSION WITH 2 PORTS (48EA/CA) ***PART #2C8610 IS A SUBSTITUTE*****

## (undated) DEVICE — SENSOR OXIMETER ADULT SPO2 RD SET (20EA/BX)

## (undated) DEVICE — BAG SPONGE COUNT 10.25 X 32 - BLUE (250/CA)

## (undated) DEVICE — SODIUM CHL IRRIGATION 0.9% 1000ML (12EA/CA)

## (undated) DEVICE — TUBING CLEARLINK DUO-VENT - C-FLO (48EA/CA)

## (undated) DEVICE — PACK MINOR BASIN - (2EA/CA)

## (undated) DEVICE — NEEDLE NON SAFETY HYPO 22 GA X 1 1/2 IN (100/BX)

## (undated) DEVICE — CLOSURE SKIN STRIP 1/2 X 4 IN - (STERI STRIP) (50/BX 4BX/CA)

## (undated) DEVICE — SYRINGE NON SAFETY 5 CC 20 GA X 1-1/2 IN (100/BX 4BX/CA)

## (undated) DEVICE — SOD. CHL. INJ. 0.9% 250 ML - (36/CA 50CA/PF)

## (undated) DEVICE — SUTURE 3-0 VICRYL PLUS - 12 X 18 INCH (12/BX)

## (undated) DEVICE — DECANTER FLD BLS - (50/CA)

## (undated) DEVICE — NEPTUNE 4 PORT MANIFOLD - (20/PK)

## (undated) DEVICE — KIT ANESTHESIA W/CIRCUIT & 3/LT BAG W/FILTER (20EA/CA)

## (undated) DEVICE — GELAQUASONIC 100 ULTRASOUND - 48/BX 20GM STERILE FOIL POUCH

## (undated) DEVICE — ELECTRODE 850 FOAM ADHESIVE - HYDROGEL RADIOTRNSPRNT (50/PK)

## (undated) DEVICE — SPONGE GAUZESTER 4 X 4 4PLY - (128PK/CA)

## (undated) DEVICE — RETRACTOR LIGHTED RADIALUX

## (undated) DEVICE — SENSOR SPO2 NEO LNCS ADHESIVE (20/BX) SEE USER NOTES

## (undated) DEVICE — SUTURE GENERAL

## (undated) DEVICE — COVER CIV-FLEX TRANSDUCER - (24/BX)

## (undated) DEVICE — STAPLER SKIN DISP - (6/BX 10BX/CA) VISISTAT

## (undated) DEVICE — ELECTRODE DUAL RETURN W/ CORD - (50/PK)

## (undated) DEVICE — SEALANT TISSUE CLOSURE KIT FIBIRIN VISTASEAL 10ML (1EA/BX)

## (undated) DEVICE — PROTECTOR ULNA NERVE - (36PR/CA)

## (undated) DEVICE — COVER PROBE STERILE CONE (12EA/CA)

## (undated) DEVICE — SUTURE 2-0 ETHILON FS - (36/BX) 18 INCH

## (undated) DEVICE — DRAPE C-ARM LARGE 41IN X 74 IN - (10/BX 2BX/CA)

## (undated) DEVICE — DRAPE CHEST/BREAST - (12EA/CA)

## (undated) DEVICE — TOWEL STOP TIMEOUT SAFETY FLAG (40EA/CA)

## (undated) DEVICE — TUBE CONNECT SUCTION CLEAR 120 X 1/4" (50EA/CA)"

## (undated) DEVICE — CLIP MED LG INTNL HRZN TI ESCP - (20/BX)

## (undated) DEVICE — NEEDLE NON SAFETY 25 GA X 1 1/2 IN HYPO (100EA/BX)

## (undated) DEVICE — CHLORAPREP 26 ML APPLICATOR - ORANGE TINT(25/CA)